# Patient Record
Sex: FEMALE | Race: WHITE | NOT HISPANIC OR LATINO | Employment: FULL TIME | URBAN - NONMETROPOLITAN AREA
[De-identification: names, ages, dates, MRNs, and addresses within clinical notes are randomized per-mention and may not be internally consistent; named-entity substitution may affect disease eponyms.]

---

## 2017-12-10 ENCOUNTER — APPOINTMENT (OUTPATIENT)
Dept: GENERAL RADIOLOGY | Facility: HOSPITAL | Age: 47
End: 2017-12-10

## 2017-12-10 ENCOUNTER — APPOINTMENT (OUTPATIENT)
Dept: CT IMAGING | Facility: HOSPITAL | Age: 47
End: 2017-12-10

## 2017-12-10 ENCOUNTER — HOSPITAL ENCOUNTER (INPATIENT)
Facility: HOSPITAL | Age: 47
LOS: 3 days | Discharge: HOME | End: 2017-12-13
Attending: FAMILY MEDICINE | Admitting: SPECIALIST

## 2017-12-10 PROBLEM — K52.9 COLITIS: Status: ACTIVE | Noted: 2017-12-10

## 2017-12-10 LAB
ALBUMIN SERPL-MCNC: 4.1 G/DL (ref 3.5–5)
ALBUMIN/GLOB SERPL: 1.4 G/DL (ref 1.1–2.5)
ALP SERPL-CCNC: 103 U/L (ref 24–120)
ALT SERPL W P-5'-P-CCNC: 63 U/L (ref 0–54)
ANION GAP SERPL CALCULATED.3IONS-SCNC: 12 MMOL/L (ref 4–13)
APTT PPP: 33.4 SECONDS (ref 24.1–34.8)
AST SERPL-CCNC: 47 U/L (ref 7–45)
BACTERIA UR QL AUTO: ABNORMAL /HPF
BASOPHILS # BLD AUTO: 0.09 10*3/MM3 (ref 0–0.2)
BASOPHILS NFR BLD AUTO: 0.4 % (ref 0–2)
BILIRUB SERPL-MCNC: 0.5 MG/DL (ref 0.1–1)
BILIRUB UR QL STRIP: NEGATIVE
BUN BLD-MCNC: 15 MG/DL (ref 5–21)
BUN/CREAT SERPL: 18.5 (ref 7–25)
CALCIUM SPEC-SCNC: 9.5 MG/DL (ref 8.4–10.4)
CHLORIDE SERPL-SCNC: 104 MMOL/L (ref 98–110)
CLARITY UR: CLEAR
CO2 SERPL-SCNC: 28 MMOL/L (ref 24–31)
COLOR UR: YELLOW
CREAT BLD-MCNC: 0.81 MG/DL (ref 0.5–1.4)
D-LACTATE SERPL-SCNC: 1.9 MMOL/L (ref 0.5–2)
D-LACTATE SERPL-SCNC: 2.6 MMOL/L (ref 0.5–2)
DEPRECATED RDW RBC AUTO: 42.6 FL (ref 40–54)
EOSINOPHIL # BLD AUTO: 0.22 10*3/MM3 (ref 0–0.7)
EOSINOPHIL NFR BLD AUTO: 0.9 % (ref 0–4)
ERYTHROCYTE [DISTWIDTH] IN BLOOD BY AUTOMATED COUNT: 13.9 % (ref 12–15)
GFR SERPL CREATININE-BSD FRML MDRD: 75 ML/MIN/1.73
GLOBULIN UR ELPH-MCNC: 2.9 GM/DL
GLUCOSE BLD-MCNC: 93 MG/DL (ref 70–100)
GLUCOSE UR STRIP-MCNC: NEGATIVE MG/DL
HCT VFR BLD AUTO: 45.5 % (ref 37–47)
HGB BLD-MCNC: 15 G/DL (ref 12–16)
HGB UR QL STRIP.AUTO: NEGATIVE
HOLD SPECIMEN: NORMAL
HYALINE CASTS UR QL AUTO: ABNORMAL /LPF
IMM GRANULOCYTES # BLD: 0.42 10*3/MM3 (ref 0–0.03)
IMM GRANULOCYTES NFR BLD: 1.7 % (ref 0–5)
INR PPP: 1.07 (ref 0.91–1.09)
KETONES UR QL STRIP: NEGATIVE
LEUKOCYTE ESTERASE UR QL STRIP.AUTO: NEGATIVE
LIPASE SERPL-CCNC: 605 U/L (ref 23–203)
LYMPHOCYTES # BLD AUTO: 3.71 10*3/MM3 (ref 0.72–4.86)
LYMPHOCYTES NFR BLD AUTO: 14.8 % (ref 15–45)
MCH RBC QN AUTO: 28.3 PG (ref 28–32)
MCHC RBC AUTO-ENTMCNC: 33 G/DL (ref 33–36)
MCV RBC AUTO: 85.8 FL (ref 82–98)
MONOCYTES # BLD AUTO: 0.91 10*3/MM3 (ref 0.19–1.3)
MONOCYTES NFR BLD AUTO: 3.6 % (ref 4–12)
NEUTROPHILS # BLD AUTO: 19.72 10*3/MM3 (ref 1.87–8.4)
NEUTROPHILS NFR BLD AUTO: 78.6 % (ref 39–78)
NITRITE UR QL STRIP: POSITIVE
NRBC BLD MANUAL-RTO: 0.1 /100 WBC (ref 0–0)
PH UR STRIP.AUTO: 6 [PH] (ref 5–8)
PLATELET # BLD AUTO: 268 10*3/MM3 (ref 130–400)
PMV BLD AUTO: 11.2 FL (ref 6–12)
POTASSIUM BLD-SCNC: 3.7 MMOL/L (ref 3.5–5.3)
PROCALCITONIN SERPL-MCNC: 3.23 NG/ML
PROT SERPL-MCNC: 7 G/DL (ref 6.3–8.7)
PROT UR QL STRIP: NEGATIVE
PROTHROMBIN TIME: 14.2 SECONDS (ref 11.9–14.6)
RBC # BLD AUTO: 5.3 10*6/MM3 (ref 4.2–5.4)
RBC # UR: ABNORMAL /HPF
REF LAB TEST METHOD: ABNORMAL
SODIUM BLD-SCNC: 144 MMOL/L (ref 135–145)
SP GR UR STRIP: >1.03 (ref 1–1.03)
SQUAMOUS #/AREA URNS HPF: ABNORMAL /HPF
TROPONIN I SERPL-MCNC: <0.012 NG/ML (ref 0–0.03)
UROBILINOGEN UR QL STRIP: ABNORMAL
WBC NRBC COR # BLD: 25.07 10*3/MM3 (ref 4.8–10.8)
WBC UR QL AUTO: ABNORMAL /HPF
WHOLE BLOOD HOLD SPECIMEN: NORMAL
WHOLE BLOOD HOLD SPECIMEN: NORMAL

## 2017-12-10 PROCEDURE — 83690 ASSAY OF LIPASE: CPT | Performed by: FAMILY MEDICINE

## 2017-12-10 PROCEDURE — C1751 CATH, INF, PER/CENT/MIDLINE: HCPCS

## 2017-12-10 PROCEDURE — 25010000002 ONDANSETRON PER 1 MG: Performed by: FAMILY MEDICINE

## 2017-12-10 PROCEDURE — 84145 PROCALCITONIN (PCT): CPT | Performed by: FAMILY MEDICINE

## 2017-12-10 PROCEDURE — 0 IOPAMIDOL 61 % SOLUTION: Performed by: FAMILY MEDICINE

## 2017-12-10 PROCEDURE — 25010000002 ENOXAPARIN PER 10 MG: Performed by: SPECIALIST

## 2017-12-10 PROCEDURE — 06HY33Z INSERTION OF INFUSION DEVICE INTO LOWER VEIN, PERCUTANEOUS APPROACH: ICD-10-PCS | Performed by: SPECIALIST

## 2017-12-10 PROCEDURE — 25010000002 MORPHINE PER 10 MG: Performed by: FAMILY MEDICINE

## 2017-12-10 PROCEDURE — 80053 COMPREHEN METABOLIC PANEL: CPT | Performed by: FAMILY MEDICINE

## 2017-12-10 PROCEDURE — 99285 EMERGENCY DEPT VISIT HI MDM: CPT

## 2017-12-10 PROCEDURE — 87040 BLOOD CULTURE FOR BACTERIA: CPT | Performed by: FAMILY MEDICINE

## 2017-12-10 PROCEDURE — 93005 ELECTROCARDIOGRAM TRACING: CPT | Performed by: SPECIALIST

## 2017-12-10 PROCEDURE — 85730 THROMBOPLASTIN TIME PARTIAL: CPT | Performed by: FAMILY MEDICINE

## 2017-12-10 PROCEDURE — 87086 URINE CULTURE/COLONY COUNT: CPT | Performed by: NURSE PRACTITIONER

## 2017-12-10 PROCEDURE — 25010000002 KETOROLAC TROMETHAMINE PER 15 MG: Performed by: FAMILY MEDICINE

## 2017-12-10 PROCEDURE — 81001 URINALYSIS AUTO W/SCOPE: CPT | Performed by: NURSE PRACTITIONER

## 2017-12-10 PROCEDURE — 84484 ASSAY OF TROPONIN QUANT: CPT | Performed by: FAMILY MEDICINE

## 2017-12-10 PROCEDURE — 83605 ASSAY OF LACTIC ACID: CPT | Performed by: FAMILY MEDICINE

## 2017-12-10 PROCEDURE — 85025 COMPLETE CBC W/AUTO DIFF WBC: CPT | Performed by: FAMILY MEDICINE

## 2017-12-10 PROCEDURE — 36415 COLL VENOUS BLD VENIPUNCTURE: CPT | Performed by: FAMILY MEDICINE

## 2017-12-10 PROCEDURE — 71010 HC CHEST PA OR AP: CPT

## 2017-12-10 PROCEDURE — 93010 ELECTROCARDIOGRAM REPORT: CPT | Performed by: INTERNAL MEDICINE

## 2017-12-10 PROCEDURE — 85610 PROTHROMBIN TIME: CPT | Performed by: FAMILY MEDICINE

## 2017-12-10 PROCEDURE — 74177 CT ABD & PELVIS W/CONTRAST: CPT

## 2017-12-10 PROCEDURE — 25010000002 CEFOXITIN PER 1 G: Performed by: SPECIALIST

## 2017-12-10 PROCEDURE — 93005 ELECTROCARDIOGRAM TRACING: CPT

## 2017-12-10 PROCEDURE — 25010000002 MORPHINE SULFATE (PF) 2 MG/ML SOLUTION: Performed by: SPECIALIST

## 2017-12-10 PROCEDURE — 25010000002 LEVOFLOXACIN PER 250 MG: Performed by: FAMILY MEDICINE

## 2017-12-10 RX ORDER — LATANOPROST 50 UG/ML
1 SOLUTION/ DROPS OPHTHALMIC NIGHTLY
Status: ON HOLD | COMMUNITY
End: 2020-12-29

## 2017-12-10 RX ORDER — SUCRALFATE ORAL 1 G/10ML
1 SUSPENSION ORAL EVERY 6 HOURS SCHEDULED
Status: DISCONTINUED | OUTPATIENT
Start: 2017-12-10 | End: 2017-12-13 | Stop reason: HOSPADM

## 2017-12-10 RX ORDER — KETOROLAC TROMETHAMINE 30 MG/ML
30 INJECTION, SOLUTION INTRAMUSCULAR; INTRAVENOUS ONCE
Status: COMPLETED | OUTPATIENT
Start: 2017-12-10 | End: 2017-12-10

## 2017-12-10 RX ORDER — CYCLOBENZAPRINE HCL 10 MG
10 TABLET ORAL 2 TIMES DAILY PRN
COMMUNITY

## 2017-12-10 RX ORDER — AMITRIPTYLINE HYDROCHLORIDE 25 MG/1
50 TABLET, FILM COATED ORAL NIGHTLY
Status: DISCONTINUED | OUTPATIENT
Start: 2017-12-10 | End: 2017-12-13 | Stop reason: HOSPADM

## 2017-12-10 RX ORDER — ALBUTEROL SULFATE 90 UG/1
2 AEROSOL, METERED RESPIRATORY (INHALATION) EVERY 4 HOURS PRN
COMMUNITY

## 2017-12-10 RX ORDER — ONDANSETRON 2 MG/ML
4 INJECTION INTRAMUSCULAR; INTRAVENOUS ONCE
Status: COMPLETED | OUTPATIENT
Start: 2017-12-10 | End: 2017-12-10

## 2017-12-10 RX ORDER — LEVOTHYROXINE SODIUM 0.12 MG/1
125 TABLET ORAL DAILY
COMMUNITY

## 2017-12-10 RX ORDER — FAMOTIDINE 20 MG/1
20 TABLET, FILM COATED ORAL EVERY 12 HOURS SCHEDULED
Status: DISCONTINUED | OUTPATIENT
Start: 2017-12-10 | End: 2017-12-13 | Stop reason: HOSPADM

## 2017-12-10 RX ORDER — HYDRALAZINE HYDROCHLORIDE 20 MG/ML
10 INJECTION INTRAMUSCULAR; INTRAVENOUS EVERY 6 HOURS PRN
Status: DISCONTINUED | OUTPATIENT
Start: 2017-12-10 | End: 2017-12-13 | Stop reason: HOSPADM

## 2017-12-10 RX ORDER — METOPROLOL TARTRATE 100 MG/1
100 TABLET ORAL 2 TIMES DAILY
COMMUNITY

## 2017-12-10 RX ORDER — LIDOCAINE HYDROCHLORIDE 10 MG/ML
1 INJECTION, SOLUTION INFILTRATION; PERINEURAL ONCE
Status: COMPLETED | OUTPATIENT
Start: 2017-12-10 | End: 2017-12-10

## 2017-12-10 RX ORDER — FAMOTIDINE 10 MG/ML
20 INJECTION, SOLUTION INTRAVENOUS EVERY 12 HOURS SCHEDULED
Status: DISCONTINUED | OUTPATIENT
Start: 2017-12-10 | End: 2017-12-13 | Stop reason: HOSPADM

## 2017-12-10 RX ORDER — NALOXONE HCL 0.4 MG/ML
0.4 VIAL (ML) INJECTION
Status: DISCONTINUED | OUTPATIENT
Start: 2017-12-10 | End: 2017-12-13 | Stop reason: HOSPADM

## 2017-12-10 RX ORDER — CLONIDINE HYDROCHLORIDE 0.1 MG/1
0.1 TABLET ORAL 3 TIMES DAILY
COMMUNITY

## 2017-12-10 RX ORDER — MORPHINE SULFATE 2 MG/ML
1 INJECTION, SOLUTION INTRAMUSCULAR; INTRAVENOUS EVERY 4 HOURS PRN
Status: DISCONTINUED | OUTPATIENT
Start: 2017-12-10 | End: 2017-12-13 | Stop reason: HOSPADM

## 2017-12-10 RX ORDER — MORPHINE SULFATE 4 MG/ML
4 INJECTION, SOLUTION INTRAMUSCULAR; INTRAVENOUS ONCE
Status: COMPLETED | OUTPATIENT
Start: 2017-12-10 | End: 2017-12-10

## 2017-12-10 RX ORDER — ONDANSETRON 8 MG/1
8 TABLET, ORALLY DISINTEGRATING ORAL EVERY 6 HOURS PRN
Status: DISCONTINUED | OUTPATIENT
Start: 2017-12-10 | End: 2017-12-13 | Stop reason: HOSPADM

## 2017-12-10 RX ORDER — NITROGLYCERIN 0.4 MG/1
0.4 TABLET SUBLINGUAL
COMMUNITY

## 2017-12-10 RX ORDER — LEVOFLOXACIN 5 MG/ML
750 INJECTION, SOLUTION INTRAVENOUS ONCE
Status: COMPLETED | OUTPATIENT
Start: 2017-12-10 | End: 2017-12-10

## 2017-12-10 RX ORDER — LOSARTAN POTASSIUM 25 MG/1
25 TABLET ORAL
Status: DISCONTINUED | OUTPATIENT
Start: 2017-12-10 | End: 2017-12-13 | Stop reason: HOSPADM

## 2017-12-10 RX ORDER — ASPIRIN 325 MG
325 TABLET ORAL DAILY
COMMUNITY
End: 2020-12-30 | Stop reason: HOSPADM

## 2017-12-10 RX ORDER — LOSARTAN POTASSIUM 100 MG/1
100 TABLET ORAL DAILY
COMMUNITY

## 2017-12-10 RX ORDER — LATANOPROST 50 UG/ML
1 SOLUTION/ DROPS OPHTHALMIC NIGHTLY
Status: DISCONTINUED | OUTPATIENT
Start: 2017-12-10 | End: 2017-12-13 | Stop reason: HOSPADM

## 2017-12-10 RX ORDER — NITROGLYCERIN 0.4 MG/1
0.4 TABLET SUBLINGUAL
Status: DISCONTINUED | OUTPATIENT
Start: 2017-12-10 | End: 2017-12-13 | Stop reason: HOSPADM

## 2017-12-10 RX ORDER — ONDANSETRON HCL IN 0.9 % NACL 8 MG/50 ML
8 INTRAVENOUS SOLUTION, PIGGYBACK (ML) INTRAVENOUS EVERY 6 HOURS PRN
Status: DISCONTINUED | OUTPATIENT
Start: 2017-12-10 | End: 2017-12-13 | Stop reason: HOSPADM

## 2017-12-10 RX ORDER — METOPROLOL TARTRATE 100 MG/1
100 TABLET ORAL EVERY 12 HOURS SCHEDULED
Status: DISCONTINUED | OUTPATIENT
Start: 2017-12-10 | End: 2017-12-13 | Stop reason: HOSPADM

## 2017-12-10 RX ORDER — AMITRIPTYLINE HYDROCHLORIDE 50 MG/1
50 TABLET, FILM COATED ORAL NIGHTLY
COMMUNITY

## 2017-12-10 RX ORDER — DEXTROSE, SODIUM CHLORIDE, AND POTASSIUM CHLORIDE 5; .45; .15 G/100ML; G/100ML; G/100ML
125 INJECTION INTRAVENOUS CONTINUOUS
Status: DISCONTINUED | OUTPATIENT
Start: 2017-12-10 | End: 2017-12-12

## 2017-12-10 RX ORDER — LEVOTHYROXINE SODIUM 0.12 MG/1
125 TABLET ORAL DAILY
Status: DISCONTINUED | OUTPATIENT
Start: 2017-12-10 | End: 2017-12-11

## 2017-12-10 RX ORDER — CYCLOBENZAPRINE HCL 10 MG
10 TABLET ORAL 3 TIMES DAILY PRN
Status: DISCONTINUED | OUTPATIENT
Start: 2017-12-10 | End: 2017-12-13 | Stop reason: HOSPADM

## 2017-12-10 RX ORDER — ALBUTEROL SULFATE 90 UG/1
2 AEROSOL, METERED RESPIRATORY (INHALATION) EVERY 4 HOURS PRN
Status: DISCONTINUED | OUTPATIENT
Start: 2017-12-10 | End: 2017-12-10 | Stop reason: SDUPTHER

## 2017-12-10 RX ORDER — DILTIAZEM HYDROCHLORIDE 60 MG/1
60 TABLET, FILM COATED ORAL EVERY 12 HOURS SCHEDULED
Status: DISCONTINUED | OUTPATIENT
Start: 2017-12-10 | End: 2017-12-13 | Stop reason: HOSPADM

## 2017-12-10 RX ORDER — DILTIAZEM HYDROCHLORIDE 60 MG/1
60 TABLET, FILM COATED ORAL 2 TIMES DAILY
COMMUNITY

## 2017-12-10 RX ORDER — CLONIDINE HYDROCHLORIDE 0.3 MG/1
0.3 TABLET ORAL EVERY 8 HOURS SCHEDULED
Status: DISCONTINUED | OUTPATIENT
Start: 2017-12-10 | End: 2017-12-13 | Stop reason: HOSPADM

## 2017-12-10 RX ORDER — SODIUM CHLORIDE 0.9 % (FLUSH) 0.9 %
1-10 SYRINGE (ML) INJECTION AS NEEDED
Status: DISCONTINUED | OUTPATIENT
Start: 2017-12-10 | End: 2017-12-13 | Stop reason: HOSPADM

## 2017-12-10 RX ADMIN — MORPHINE SULFATE 4 MG: 4 INJECTION, SOLUTION INTRAMUSCULAR; INTRAVENOUS at 03:37

## 2017-12-10 RX ADMIN — POTASSIUM CHLORIDE, DEXTROSE MONOHYDRATE AND SODIUM CHLORIDE 125 ML/HR: 150; 5; 450 INJECTION, SOLUTION INTRAVENOUS at 12:01

## 2017-12-10 RX ADMIN — CLONIDINE HYDROCHLORIDE 0.3 MG: 0.3 TABLET ORAL at 17:18

## 2017-12-10 RX ADMIN — METOPROLOL TARTRATE 100 MG: 100 TABLET ORAL at 20:21

## 2017-12-10 RX ADMIN — METRONIDAZOLE 500 MG: 500 INJECTION, SOLUTION INTRAVENOUS at 18:06

## 2017-12-10 RX ADMIN — DILTIAZEM HYDROCHLORIDE 60 MG: 60 TABLET, FILM COATED ORAL at 20:22

## 2017-12-10 RX ADMIN — IOPAMIDOL 150 ML: 612 INJECTION, SOLUTION INTRAVENOUS at 04:12

## 2017-12-10 RX ADMIN — MORPHINE SULFATE 1 MG: 2 INJECTION, SOLUTION INTRAMUSCULAR; INTRAVENOUS at 12:44

## 2017-12-10 RX ADMIN — SODIUM CHLORIDE, POTASSIUM CHLORIDE, SODIUM LACTATE AND CALCIUM CHLORIDE 1000 ML: 600; 310; 30; 20 INJECTION, SOLUTION INTRAVENOUS at 12:15

## 2017-12-10 RX ADMIN — ONDANSETRON 8 MG: 8 TABLET, ORALLY DISINTEGRATING ORAL at 12:44

## 2017-12-10 RX ADMIN — ENOXAPARIN SODIUM 30 MG: 30 INJECTION SUBCUTANEOUS at 20:22

## 2017-12-10 RX ADMIN — CEFOXITIN 2 G: 2 INJECTION, POWDER, FOR SOLUTION INTRAVENOUS at 17:12

## 2017-12-10 RX ADMIN — METOPROLOL TARTRATE 100 MG: 100 TABLET ORAL at 12:04

## 2017-12-10 RX ADMIN — CLONIDINE HYDROCHLORIDE 0.3 MG: 0.3 TABLET ORAL at 22:22

## 2017-12-10 RX ADMIN — LOSARTAN POTASSIUM 25 MG: 25 TABLET, FILM COATED ORAL at 12:03

## 2017-12-10 RX ADMIN — CEFOXITIN 2 G: 2 INJECTION, POWDER, FOR SOLUTION INTRAVENOUS at 22:22

## 2017-12-10 RX ADMIN — METRONIDAZOLE 500 MG: 500 INJECTION, SOLUTION INTRAVENOUS at 06:13

## 2017-12-10 RX ADMIN — FAMOTIDINE 20 MG: 20 TABLET ORAL at 20:21

## 2017-12-10 RX ADMIN — LEVOFLOXACIN 750 MG: 5 INJECTION, SOLUTION INTRAVENOUS at 06:12

## 2017-12-10 RX ADMIN — METRONIDAZOLE 500 MG: 500 INJECTION, SOLUTION INTRAVENOUS at 23:42

## 2017-12-10 RX ADMIN — MORPHINE SULFATE 1 MG: 2 INJECTION, SOLUTION INTRAMUSCULAR; INTRAVENOUS at 17:19

## 2017-12-10 RX ADMIN — METRONIDAZOLE 500 MG: 500 INJECTION, SOLUTION INTRAVENOUS at 12:14

## 2017-12-10 RX ADMIN — AMITRIPTYLINE HYDROCHLORIDE 50 MG: 25 TABLET, FILM COATED ORAL at 20:21

## 2017-12-10 RX ADMIN — ENOXAPARIN SODIUM 30 MG: 30 INJECTION SUBCUTANEOUS at 12:01

## 2017-12-10 RX ADMIN — ONDANSETRON 4 MG: 2 INJECTION, SOLUTION INTRAMUSCULAR; INTRAVENOUS at 01:47

## 2017-12-10 RX ADMIN — LATANOPROST 1 DROP: 50 SOLUTION OPHTHALMIC at 20:21

## 2017-12-10 RX ADMIN — LIDOCAINE HYDROCHLORIDE 1 ML: 10 INJECTION, SOLUTION INFILTRATION; PERINEURAL at 11:25

## 2017-12-10 RX ADMIN — SODIUM CHLORIDE 1000 ML: 9 INJECTION, SOLUTION INTRAVENOUS at 04:25

## 2017-12-10 RX ADMIN — KETOROLAC TROMETHAMINE 30 MG: 30 INJECTION, SOLUTION INTRAMUSCULAR at 01:45

## 2017-12-10 RX ADMIN — CLONIDINE HYDROCHLORIDE 0.3 MG: 0.3 TABLET ORAL at 12:03

## 2017-12-10 RX ADMIN — DILTIAZEM HYDROCHLORIDE 60 MG: 60 TABLET, FILM COATED ORAL at 12:04

## 2017-12-10 RX ADMIN — LEVOTHYROXINE SODIUM 125 MCG: 0.12 TABLET ORAL at 12:04

## 2017-12-10 RX ADMIN — FAMOTIDINE 20 MG: 10 INJECTION, SOLUTION INTRAVENOUS at 12:02

## 2017-12-10 NOTE — ED PROVIDER NOTES
[facility]  eMERGENCY dEPARTMENT eNCOUnter      Pt Name: Gale Stone  MRN: [id number]  Birthdate 1970  Date of evaluation: 12/10/2017      CHIEF COMPLAINT       Chief Complaint   Patient presents with   • Chest Pain   • Abdominal Pain       Nurses Notes reviewed and I agree except as noted in the HPI.      HISTORY OF PRESENT ILLNESS    Gale Stone is a 47 y.o. female who presents       Patient extremely poor historian who originally checked in for constipation.  Soon after checking in with a chief complaint patient was called to the triage desk and at that point stated I have to go to the bathroom.  She ran to the restroom and came back and stated that she had had constipation ×4 days and could not have a bowel movement.  Patient told triage that she took several laxatives in order to be able to induce a bowel movement.  Patient could not remember when she took the laxatives.  Patient then told triage that she had been seen at [facility] and had been admitted until yesterday for diarrhea.  Patient was questioned repeatedly about this contradiction however the patient kept repeating it.  Patient was not really sure of dates times or what really transpired.  During the course of triage patient actually went to the bathroom 2 other times.  Both times she stated she had very large bowel movements.  At this point she was still saying she was constipated and could not have a bowel movement ×4 days triage asked her if she felt better and she said it just however now have abdominal pain and I have chest pain in my sternum.  Upon arrival patient's vital signs were not indicative of any infectious process.  Only after initial lab values were obtained with any nothing abnormal noted.  At that time it was noted that she did have an isolated leukocytosis.     REVIEW OF SYSTEMS     Review of Systems     Very unreliable historian leads to a question of the validity of the following review of  systems.    CONSTITUION: No Fever, No chills, No activity change, No diaphoresis, No unexpected wt change.    HENT: No congestion, no dental problems, no ear pain or discharge,   EYES: No drainage, no itching, no photophobia, no visual disturbance  RESPIRATORY: No apnea, no chest tightness, no cough, no shortness of breath, no stridor, no wheezing  CARDIOVASCULAR: As per the HPI  GI: As per the HPI  ENDOCRINE: No polydipsia, no polyphagia, no polyuria, no cold or heat intolerance  : No difficulty urinating, no dyspareunia, nodysuria, no flank pain, no frequency, no genital sore, no hematuria, no menstrual problem if female, no decreased urniation, no hesitation of urination, no vaginal discharge if female, no vaginal pain if female no penile pain if male  ALLERG/IMMUNO:  No env or food allergies, not immunocompromised  NEUROLOGICAL: No dizziness, no facial asymmetry, no Headaches, no Light-headedness, no numbess nor paresthesias, no seizures, no speech difficulty, No syncope, no temors, no weakness  HEMATOLOGIC: No adenopathy, no unusual bleeding or bruising  MUSC: No arthralgia, no back pain, no joint swelling, no myalgias, no neck pain, no neck stiffness  SKIN: No rash, no color change, no pallor, no wound  PSYCH: No agitation, no behavior problem, no confusion, no decr concentration, no hallucinations, no suicidal ideation, no homicidal ideation, no self injury, no sleep disturbance      PAST MEDICAL HISTORY     Past Medical History:   Diagnosis Date   • Disease of thyroid gland    • Hypertension        SURGICAL HISTORY      has a past surgical history that includes Tubal ligation.    CURRENT MEDICATIONS        Medication List      ASK your doctor about these medications          albuterol 108 (90 Base) MCG/ACT inhaler   Commonly known as:  PROVENTIL HFA;VENTOLIN HFA       amitriptyline 50 MG tablet   Commonly known as:  ELAVIL       aspirin 325 MG tablet       CloNIDine 0.3 MG tablet   Commonly known as:   "CATAPRES       COZAAR PO       cyclobenzaprine 10 MG tablet   Commonly known as:  FLEXERIL       diltiaZEM 60 MG tablet   Commonly known as:  CARDIZEM       latanoprost 0.005 % ophthalmic solution   Commonly known as:  XALATAN       levothyroxine 125 MCG tablet   Commonly known as:  SYNTHROID, LEVOTHROID       metoprolol tartrate 100 MG tablet   Commonly known as:  LOPRESSOR       NITROSTAT 0.4 MG SL tablet   Generic drug:  nitroglycerin           ALLERGIES     has No Known Allergies.    FAMILY HISTORY     has no family status information on file.  family history is not on file.    SOCIAL HISTORY      reports that she has never smoked. She does not have any smokeless tobacco history on file. She reports that she does not drink alcohol or use illicit drugs.    PHYSICAL EXAM     INITIAL VITALS:  height is 160 cm (63\") and weight is 104 kg (230 lb). Her temperature is 97.5 °F (36.4 °C). Her blood pressure is 160/107 (abnormal) and her pulse is 70. Her respiration is 16 and oxygen saturation is 93%.    Physical Exam    CONSTITUTIONAL: Well developed, well nourished, not diaphoretic nor distressed  HENT: Normocephalic, atraumatic, oropharynx clear and moist  EYES: PERRL, EOM normal, no discharge, no scleral icterus  NECK: ROM normal, supple, no tracheal deviation nor JVD, no stridor  CARDIOVASCULAR: Normal rate and rhythm, heart sounds normal, no rub no gallop, intact distal pulses, normal cap refill  PULMONARY: Normal effort and breath sounds, no distress, no wheezes, rhonci or rales, no chest tenderness  ABDOMINAL: Soft, diffuse mild tenderness noted increased bowel sounds GENITOURINARY/ANORECTAL: deferred  MUSCKULOSKELETAL: ROM normal, no tenderness nor deformity, no edema  NEUROLOGICAL: Alert, oriented x 3, DTRS normal, CN x 10 normal, normal tone  SKIN: Warm, dry, no erythema, no rash, normal color  PSYCH: Mood and affect normal, behavior normal, thought content and judgement normal.    DIFFERENTIAL DIAGNOSIS: "       DIAGNOSTIC RESULTS     EKG: All EKG's are interpreted by the Emergency Department Physician who either signs or Co-signs this chart in the absence of a cardiologist.  EKG normal sinus rhythm 76 bpm normal axis no acute ST-T wave changes noted IVCD noted    RADIOLOGY: non-plain film images(s) such as CT, Ultrasound and MRI are read by the radiologist.  Plain radiographic images are visualized and preliminarily interpreted by the emergency physician unless otherwise stated below.  XR Chest 1 View    (Results Pending)   CT Abdomen Pelvis With Contrast    (Results Pending)              LABS:   Lab Results (last 24 hours)     Procedure Component Value Units Date/Time    Troponin [[id number]]  (Normal) Collected:  12/10/17 0048    Specimen:  Blood Updated:  12/10/17 0117     Troponin I <0.012 ng/mL     CBC & Differential [[id number]] Collected:  12/10/17 0048    Specimen:  Blood Updated:  12/10/17 0101    Narrative:       The following orders were created for panel order CBC & Differential.  Procedure                               Abnormality         Status                     ---------                               -----------         ------                     CBC Auto Differential[[id number]]         Abnormal            Final result                 Please view results for these tests on the individual orders.    Comprehensive Metabolic Panel [[id number]]  (Abnormal) Collected:  12/10/17 0048    Specimen:  Blood Updated:  12/10/17 0107     Glucose 93 mg/dL      BUN 15 mg/dL      Creatinine 0.81 mg/dL      Sodium 144 mmol/L      Potassium 3.7 mmol/L      Chloride 104 mmol/L      CO2 28.0 mmol/L      Calcium 9.5 mg/dL      Total Protein 7.0 g/dL      Albumin 4.10 g/dL      ALT (SGPT) 63 (H) U/L      AST (SGOT) 47 (H) U/L      Alkaline Phosphatase 103 U/L      Total Bilirubin 0.5 mg/dL      eGFR Non African Amer 75 mL/min/1.73      Globulin 2.9 gm/dL      A/G Ratio 1.4 g/dL      BUN/Creatinine Ratio 18.5     Anion Gap 12.0  mmol/L     CBC Auto Differential [[id number]]  (Abnormal) Collected:  12/10/17 0048    Specimen:  Blood Updated:  12/10/17 0101     WBC 25.07 (H) 10*3/mm3      RBC 5.30 10*6/mm3      Hemoglobin 15.0 g/dL      Hematocrit 45.5 %      MCV 85.8 fL      MCH 28.3 pg      MCHC 33.0 g/dL      RDW 13.9 %      RDW-SD 42.6 fl      MPV 11.2 fL      Platelets 268 10*3/mm3      Neutrophil % 78.6 (H) %      Lymphocyte % 14.8 (L) %      Monocyte % 3.6 (L) %      Eosinophil % 0.9 %      Basophil % 0.4 %      Immature Grans % 1.7 %      Neutrophils, Absolute 19.72 (H) 10*3/mm3      Lymphocytes, Absolute 3.71 10*3/mm3      Monocytes, Absolute 0.91 10*3/mm3      Eosinophils, Absolute 0.22 10*3/mm3      Basophils, Absolute 0.09 10*3/mm3      Immature Grans, Absolute 0.42 (H) 10*3/mm3      nRBC 0.1 (H) /100 WBC     Lactic Acid, Plasma [[id number]]  (Abnormal) Collected:  12/10/17 0336    Specimen:  Blood Updated:  12/10/17 0358     Lactate 2.6 (C) mmol/L     Lactic Acid, Reflex Timer [[id number]] Collected:  12/10/17 0336    Specimen:  Blood Updated:  12/10/17 0358          EMERGENCY DEPARTMENT COURSE:   Vitals:    Vitals:    12/10/17 0315 12/10/17 0427 12/10/17 0430 12/10/17 0459   BP:  (!) 160/107     Pulse: 73  75 70   Resp:       Temp:       SpO2: 93%  94% 93%   Weight:       Height:           The patient was given the following medications:  Medications   metroNIDAZOLE (FLAGYL) IVPB 500 mg (not administered)   levoFLOXacin (LEVAQUIN) 750 mg/150 mL D5W (premix) (LEVAQUIN) 750 mg (not administered)   ketorolac (TORADOL) injection 30 mg (30 mg Intravenous Given 12/10/17 0145)   ondansetron (ZOFRAN) injection 4 mg (4 mg Intravenous Given 12/10/17 0147)   Morphine sulfate (PF) injection 4 mg (4 mg Intravenous Given 12/10/17 0337)   sodium chloride 0.9 % bolus 1,000 mL (1,000 mL Intravenous New Bag 12/10/17 9176)   iopamidol (ISOVUE-300) 61 % injection 150 mL (150 mL Intravenous Given 12/10/17 0262)       ED Course   Patient received no relief  from IV Toradol.  Patient was given morphine and did receive relief.  Patient's vital signs have remained stable throughout her stay.  No indication of sepsis at this time.    CRITICAL CARE:  none    CONSULTS:  none    PROCEDURES:  None    FINAL IMPRESSION    No diagnosis found.      DISPOSITION/PLAN   Patient will be admitted to the service of Dr. Cui.  She is in improved condition.      PATIENT REFERRED TO:  No follow-up provider specified.    DISCHARGE MEDICATIONS:     Medication List      ASK your doctor about these medications          albuterol 108 (90 Base) MCG/ACT inhaler   Commonly known as:  PROVENTIL HFA;VENTOLIN HFA       amitriptyline 50 MG tablet   Commonly known as:  ELAVIL       aspirin 325 MG tablet       CloNIDine 0.3 MG tablet   Commonly known as:  CATAPRES       COZAAR PO       cyclobenzaprine 10 MG tablet   Commonly known as:  FLEXERIL       diltiaZEM 60 MG tablet   Commonly known as:  CARDIZEM       latanoprost 0.005 % ophthalmic solution   Commonly known as:  XALATAN       levothyroxine 125 MCG tablet   Commonly known as:  SYNTHROID, LEVOTHROID       metoprolol tartrate 100 MG tablet   Commonly known as:  LOPRESSOR       NITROSTAT 0.4 MG SL tablet   Generic drug:  nitroglycerin           (Please note that portions of this note were completed with a voice recognition program.)    Carley Leong, DO Carley Leong,   12/10/17 0513       Carley Leong DO  12/10/17 0600

## 2017-12-10 NOTE — H&P
Patient Care Team:  No Known Provider as PCP - General (Pharmacy)    Chief complaint central abdominal pain   Subjective     Subjective     Gale Stone  is a 47 y.o. female presents withCentral abdominal pain.  She states that she was admitted at [facility] 5 days ago and was in the hospital for the past 5 days with breathing problems and also some nausea.  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  She was released from the hospital on Saturday and she had some central abdominal pain and also some diarrhea and with this she was taken to [facility] for evaluation she told them she also had a umbilical hernia and the ambulance personnel took her to [facility] as there is no surgeon at [facility] if surgery was required.  On examination once again her history is somewhat sketchy she vacillates between breathing problems, hypertension, nausea, and intermittent diarrhea.  No history of any antibiotic use.  On examination she has occasional bowel sounds, tenderness in the central and right and left flank.  No peritoneal signs, I do not appreciate any hernia in the periumbilical region which she states that she has a hernia and on her CT scan there shows to be colitis from the hepatic flexure to the mid descending colon.  There is no hernia noted in the abdominal wall either.  With the above problem is presumed that she has colitis her last colonoscopy was over 10 years prior we will begin treatment for colitis we will check a C. difficile, we will have a PICC line placed, she has a large large amount of bruising from her Lovenox we will change that to the thigh instead of the abdomen and ultimately had a colonoscopy completed or set up prior  to her discharge.      Past surgical history significant for tubal ligation and also tubes and the eye for treatment of glaucoma by Dr. Ham     Medical history significant for hypertension, obesity, glaucoma, asthma, hypothyroidism and hypoglycemia.      She is cared for at the [facility] medical clinic.      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    Review of Systems   Pertinent items are noted in HPI, all other systems reviewed and negative    History  Past Medical History:   Diagnosis Date   • Disease of thyroid gland    • Hypertension      Past Surgical History:   Procedure Laterality Date   • TUBAL ABDOMINAL LIGATION       History reviewed. No pertinent family history.  Social History   Substance Use Topics   • Smoking status: Never Smoker   • Smokeless tobacco: None   • Alcohol use No       (Not in a hospital admission)  Allergies:  Review of patient's allergies indicates no known allergies.    Problem list  Patient Active Problem List   Diagnosis   • Colitis       Objective      Objective     Vital Signs  Temp:  [97.5 °F (36.4 °C)-98.1 °F (36.7 °C)] 98.1 °F (36.7 °C)  Heart Rate:  [70-80] 74  Resp:  [16] 16  BP: (118-166)/() 153/98    Physical Exam:      General Appearance:    Alert, cooperative, in no acute distress   Head:    Normocephalic, without obvious abnormality, atraumatic   Eyes:            Lids and lashes normal, conjunctivae and sclerae normal, no   icterus, no pallor, corneas clear, PERRLA   Ears:    Ears appear intact with no abnormalities noted   Throat:   No oral lesions, no thrush, oral mucosa moist   Neck:   No adenopathy, supple, trachea midline, no thyromegaly, no   carotid bruit, no JVD   Back:     No kyphosis present, no scoliosis present, no skin lesions,      erythema or scars, no tenderness to percussion or                   palpation,   range of  motion normal   Lungs:     Clear to auscultation,respirations regular, even and                  unlabored    Heart:    Regular rhythm and normal rate, normal S1 and S2, no            murmur, no gallop, no rub, no click   Chest Wall:    No abnormalities observed   Abdomen:    Multiple sites were Lovenox has been given over the past week with significant bruising noted, occasional bowel sounds, well-healed infraumbilical incision, no obvious hernia noted, tenderness in the central portion of her abdomen as well as right and left flank more so at the splenic flexure.  No peritoneal signs.   Rectal:     Deferred   Extremities:   Moves all extremities well, no edema, no cyanosis, no             redness   Pulses:   Pulses palpable and equal bilaterally   Skin:   No bleeding, bruising or rash   Lymph nodes:   No palpable adenopathy   Neurologic:   Cranial nerves 2 - 12 grossly intact, sensation intact, DTR       present and equal bilaterally       Results Review:    I reviewed the patient's new imaging results and agree with the interpretation.      Results from last 7 days  Lab Units 12/10/17  0048   WBC 10*3/mm3 25.07*   HEMOGLOBIN g/dL 15.0   HEMATOCRIT % 45.5   PLATELETS 10*3/mm3 268          Results from last 7 days  Lab Units 12/10/17  0048   SODIUM mmol/L 144   POTASSIUM mmol/L 3.7   CHLORIDE mmol/L 104   CO2 mmol/L 28.0   BUN mg/dL 15   CREATININE mg/dL 0.81   CALCIUM mg/dL 9.5   BILIRUBIN mg/dL 0.5   ALK PHOS U/L 103   ALT (SGPT) U/L 63*   AST (SGOT) U/L 47*   GLUCOSE mg/dL 93       Assessment/Plan     Assessment/Plan     Active Problems:    Colitis      Patient with obesity, hypertension, hypothyroidism, hypo-glycemia, glaucoma, now with recent history of central abdominal pain and a white count elevation of 25,000 with a CT scan showing colitis from the hepatic flexure to the distal descending colon.  We will check an C. difficile on the patient, also begin treatment for presumed ischemic colitis ultimately she  will need a intervention with a colonoscopy to evaluate the colon in the near future.  Her  sees Dr. Hollins.  She has not seen a gastroenterologist in the past 10 years.  Patient and  are aware of the findings and plan and give their informed consent for admission.     Hospitalist tamra Scott to see, also a PICC line will be placed.        I discussed the patients findings and my recommendations with patient, family, nursing staff and primary care team.     Rm Cox MD  12/10/17  6:53 AM    Time:Time spent with the patient 30 minutes     EMR Dragon/Transcription disclaimer: Much of this encounter note is an electronic transcription/translation of spoken language to printed text. The electronic translation of spoken language may permit erroneous, or at times, nonsensical words or phrases to be inadvertently transcribed; although I have reviewed the note for such errors, some may still exist.

## 2017-12-10 NOTE — CONSULTS
11 cm Dual Lumen Power Midline catheter placed in pt right basilic vein. Pt tolerated procedure well. Line flushes and draws well. Sterile dressing applied.

## 2017-12-10 NOTE — CONSULTS
"      [facility] Medicine Consult  Inpatient Consult to Hospitalist  Consult performed by: TREVOR WARNER  Consult ordered by: NALINI COX          Date of Admission: 12/10/2017  Date of Consult: 12/10/17    Primary Care Physician: No Known Provider  Referring Physician: Dr. Nalini Cox  Chief Complaint/Reason for Consultation: Hypertension    Subjective   History of Present Illness  The patient is a 47-year-old  female who presented to the [facility] emergency department early this morning after being discharged from [facility].  She states she was only home for a period of about 2 hours after being discharged from the hospital. The patient states she was in the bathroom at her home and felt like she was going to \"pass out\" so she told her  to call an ambulance.    The patient is felt to be a very vague historian.  She states that she was admitted to [facility] from last Sunday through Saturday for what she describes as vomiting and breathing problems.  She does have a known history of asthma.  She denies any shortness of breath or chest pain at this time.  She denies being on any antibiotics while recently hospitalized, she states the only thing she remembers taking are breathing treatments.    Upon presentation to the emergency department, the patient complained of centralized abdominal pain and chest pain.  She did tell Dr. Cox that she had been having diarrhea, she denies this to me.  She denies any nausea or vomiting at this time.  She states her last colonoscopy was over 10 years ago.  She does have lower abdominal tenderness upon my examination.  She denies any dysuria or frequency.    The patient states she has a past medical history significant for hypertension, hypothyroidism, asthma, and glaucoma.  She states that when she has been acutely ill in the past, she has trouble controlling her blood " pressure. Her current blood pressure is 138/102 but she not yet had her antihypertensive medications today.    Review of Systems   Otherwise complete ROS is negative except as mentioned above.    Past Medical History:   Past Medical History:   Diagnosis Date   • Disease of thyroid gland    • Hypertension      Past Surgical History:  Past Surgical History:   Procedure Laterality Date   • TUBAL ABDOMINAL LIGATION       Social History: She reports that she does not drink alcohol or use illicit drugs.  She is a former smoker, states that she quit 18 years ago.  She only smoked for about 4 years. She lives with her . She has 2 children, a son and a daughter.    Family History: She reports that her entire family has had problems with blood pressure.  She states that her father as well as 4 of his brothers had problems with hypertension.  She believes her maternal grandfather had several different types of cancer, but she is unsure what type of cancer.    Allergies: No Known Allergies  Medications: Scheduled Meds:  amitriptyline 50 mg Oral Nightly   cefOXitin 2 g Intravenous Q6H   CloNIDine 0.3 mg Oral Q8H   diltiaZEM 60 mg Oral Q12H   enoxaparin 30 mg Subcutaneous Q12H   famotidine 20 mg Intravenous Q12H   Or      famotidine 20 mg Oral Q12H   lactated ringers 1,000 mL Intravenous Once   latanoprost 1 drop Both Eyes Nightly   levothyroxine 125 mcg Oral Daily   losartan 25 mg Oral Q24H   metoprolol tartrate 100 mg Oral Q12H   metroNIDAZOLE 500 mg Intravenous Q6H   sucralfate 1 g Oral Q6H     Continuous Infusions:  dextrose 5 % and sodium chloride 0.45 % with KCl 20 mEq/L 125 mL/hr     PRN Meds:.•  albuterol  •  cyclobenzaprine  •  Morphine **AND** naloxone  •  Morphine **AND** naloxone  •  nitroglycerin  •  ondansetron  •  ondansetron ODT  •  sodium chloride    Objective   Objective    Physical Exam:   Temp:  [97.5 °F (36.4 °C)-98.3 °F (36.8 °C)] 98.3 °F (36.8 °C)  Heart Rate:  [69-80] 70  Resp:  [16] 16  BP:  (118-166)/() 138/102  Physical Exam   Constitutional: She is oriented to person, place, and time. She appears well-developed and well-nourished. No distress.   HENT:   Head: Normocephalic and atraumatic.   Neck: Normal range of motion. Neck supple.   Cardiovascular: Normal rate, regular rhythm and normal heart sounds.  Exam reveals no gallop and no friction rub.    No murmur heard.  Pulmonary/Chest: Effort normal and breath sounds normal. She has no wheezes. She has no rales.   Abdominal: Soft. Bowel sounds are normal. She exhibits no distension. There is tenderness (Lower abdominal/suprapubic tenderness).   Musculoskeletal: Normal range of motion. She exhibits no edema.   Neurological: She is alert and oriented to person, place, and time.   Skin: Skin is warm and dry.   Scattered bruising on abdomen related to Lovenox administration from recent hospitalization   Psychiatric: She has a normal mood and affect. Her behavior is normal. Judgment and thought content normal.   Vitals reviewed.    Results Reviewed:  I have personally reviewed current lab, radiology, and data and agree with results.  Lab Results (last 24 hours)     Procedure Component Value Units Date/Time    CBC & Differential [[id number]] Collected:  12/10/17 0048    Specimen:  Blood Updated:  12/10/17 0101    Narrative:       The following orders were created for panel order CBC & Differential.  Procedure                               Abnormality         Status                     ---------                               -----------         ------                     CBC Auto Differential[[id number]]         Abnormal            Final result                 Please view results for these tests on the individual orders.    CBC Auto Differential [[id number]]  (Abnormal) Collected:  12/10/17 0048    Specimen:  Blood Updated:  12/10/17 0101     WBC 25.07 (H) 10*3/mm3      RBC 5.30 10*6/mm3      Hemoglobin 15.0 g/dL      Hematocrit 45.5 %      MCV 85.8 fL       MCH 28.3 pg      MCHC 33.0 g/dL      RDW 13.9 %      RDW-SD 42.6 fl      MPV 11.2 fL      Platelets 268 10*3/mm3      Neutrophil % 78.6 (H) %      Lymphocyte % 14.8 (L) %      Monocyte % 3.6 (L) %      Eosinophil % 0.9 %      Basophil % 0.4 %      Immature Grans % 1.7 %      Neutrophils, Absolute 19.72 (H) 10*3/mm3      Lymphocytes, Absolute 3.71 10*3/mm3      Monocytes, Absolute 0.91 10*3/mm3      Eosinophils, Absolute 0.22 10*3/mm3      Basophils, Absolute 0.09 10*3/mm3      Immature Grans, Absolute 0.42 (H) 10*3/mm3      nRBC 0.1 (H) /100 WBC     Comprehensive Metabolic Panel [[id number]]  (Abnormal) Collected:  12/10/17 0048    Specimen:  Blood Updated:  12/10/17 0107     Glucose 93 mg/dL      BUN 15 mg/dL      Creatinine 0.81 mg/dL      Sodium 144 mmol/L      Potassium 3.7 mmol/L      Chloride 104 mmol/L      CO2 28.0 mmol/L      Calcium 9.5 mg/dL      Total Protein 7.0 g/dL      Albumin 4.10 g/dL      ALT (SGPT) 63 (H) U/L      AST (SGOT) 47 (H) U/L      Alkaline Phosphatase 103 U/L      Total Bilirubin 0.5 mg/dL      eGFR Non African Amer 75 mL/min/1.73      Globulin 2.9 gm/dL      A/G Ratio 1.4 g/dL      BUN/Creatinine Ratio 18.5     Anion Gap 12.0 mmol/L     Troponin [[id number]]  (Normal) Collected:  12/10/17 0048    Specimen:  Blood Updated:  12/10/17 0117     Troponin I <0.012 ng/mL     [facility] Draw [[id number]] Collected:  12/10/17 0048    Specimen:  Blood Updated:  12/10/17 0203    Narrative:       The following orders were created for panel order [facility] Draw.  Procedure                               Abnormality         Status                     ---------                               -----------         ------                     Light Blue Top[[id number]]                                    Final result               Green Top (Gel)[[id number]]                                   Final result               Lavender Top[[id number]]                                      Final result               Red Top[[id number]]                                            Final result                 Please view results for these tests on the individual orders.    Light Blue Top [[id number]] Collected:  12/10/17 0048    Specimen:  Blood Updated:  12/10/17 0203     Extra Tube hold for add-on      Auto resulted       Green Top (Gel) [[id number]] Collected:  12/10/17 0048    Specimen:  Blood Updated:  12/10/17 0203     Extra Tube Hold for add-ons.      Auto resulted.       Lavender Top [[id number]] Collected:  12/10/17 0048    Specimen:  Blood Updated:  12/10/17 0203     Extra Tube hold for add-on      Auto resulted       Red Top [[id number]] Collected:  12/10/17 0048    Specimen:  Blood Updated:  12/10/17 0203     Extra Tube Hold for add-ons.      Auto resulted.       Lactic Acid, Plasma [[id number]]  (Abnormal) Collected:  12/10/17 0336    Specimen:  Blood Updated:  12/10/17 0358     Lactate 2.6 (C) mmol/L     Lipase [[id number]]  (Abnormal) Collected:  12/10/17 0609    Specimen:  Blood Updated:  12/10/17 0641     Lipase 605 (H) U/L     aPTT [[id number]]  (Normal) Collected:  12/10/17 0609    Specimen:  Blood Updated:  12/10/17 0656     PTT 33.4 seconds     Protime-INR [[id number]]  (Normal) Collected:  12/10/17 0609    Specimen:  Blood Updated:  12/10/17 0656     Protime 14.2 Seconds      INR 1.07    Procalcitonin [[id number]]  (Abnormal) Collected:  12/10/17 0609    Specimen:  Blood Updated:  12/10/17 0700     Procalcitonin 3.23 (H) ng/mL     Narrative:       SIRS, sepsis, severe sepsis, and septic shock are categorized according to the criteria of the consensus conference of the American College of Chest Physicians/Society of Critical Care Medicine.    PCT < 0.5 ng/mL     Systemic infection (sepsis) is not likely.    PCT >0.5 and < 2.0 ng/mL Systemic infection (sepsis) is possible, but other conditions are known to elevate PCT as well.    PCT > 2.0 ng/mL     Systemic infection (sepsis) is likely, unless other causes are known.      PCT > 10.0  ng/mL    Important systemic inflammatory response, almost exclusively due to severe bacterial sepsis or septic shock.    PCT values of < 0.5 ng/mL do not exclude an infection, because localized infections (without systemic signs) may be associated with such low concentrations, or a systemic infection in its initial stages (<6 hours).  Increased PCT can occur without infection.  PCT concentrations between 0.5 and 2.0 ng/mL should be interpreted taking into account the patients history.  It is recommended to retest PCT within 6-24 hours if any concentrations < 2.0 ng/mL are obtained.    Lactic Acid, Reflex Timer [[id number]] Collected:  12/10/17 0336    Specimen:  Blood Updated:  12/10/17 0701     Extra Tube Hold for add-ons.      Auto resulted.       Blood Culture - Blood, [[id number]] Collected:  12/10/17 0608    Specimen:  Blood from Arm, Right Updated:  12/10/17 0805    Blood Culture - Blood, [[id number]] Collected:  12/10/17 0603    Specimen:  Blood from Arm, Right Updated:  12/10/17 0805        Imaging Results (last 24 hours)     Procedure Component Value Units Date/Time    CT Abdomen Pelvis With Contrast [[id number]] Collected:  12/10/17 0658     Updated:  12/10/17 0722    Narrative:          History:  47-year-old evaluated for abdominal pain and leukocytosis.     Reference:  CT abdomen pelvis January 2012.     Technique  Contrast-enhanced CT abdomen/pelvis was performed with coronal and  sagittal reformatted images provided.     For this CT exam, one or more of the following dose reduction techniques  was employed:  -automated exposure control  -mA and/or kVp adjustment for patient size  -iterative reconstruction      mGy-cm     Findings     Chest  Incidental scanning through the lower chest demonstrates clear lung  bases. Slightly elevated right hemidiaphragm. Areas of suspected fat  necrosis in the right breast noted.     Abdomen  Fatty liver. Gallbladder is within normal limits for CT.  Spleen  unremarkable. No pancreatic or adrenal abnormality. No significant renal  abnormality. Minimal plaquing at the distal abdominal aorta without  aneurysm. Celiac, SMA, and CRISTOPHER vessels are widely patent proximally.     Scattered wall thickening throughout the colon most significant at the  splenic flexure and proximal descending colon where there is slight  edema/inflammation of the adjacent fat. There is no evidence of  perforation or abscess. No bowel obstructive process. The appendix is  normal. There is no free air or free fluid. No lymphadenopathy.     Pelvis  Urinary bladder unremarkable. The uterus is unremarkable. Both ovaries  are visualized. No pelvic free fluid.     No acute osseous abnormalities.       Impression:       Impression:  1. Colitis. Consider infectious and inflammatory etiologies.  2. Incidental findings as described.     A preliminary report was provided by [facility]. Final impression is  concordant with preliminary results.  This report was finalized on 12/10/2017 07:19 by  Esvin Conway, .    XR Chest 1 View [[id number]] Collected:  12/10/17 0728     Updated:  12/10/17 0732    Narrative:       History:  47-year-old evaluated for chest pain.      Reference:  No prior chest x-ray     Findings:  Frontal chest radiograph performed.     The heart and mediastinum appear normal. Suboptimal inspiration with  lungs grossly clear. The extreme lung bases are not evaluated.. No  pleural fluid or pneumothorax. No acute osseous abnormality.          Impression:       Impression:  No acute cardiopulmonary findings.  This report was finalized on 12/10/2017 07:29 by  Esvin Conway, .          Assessment / Plan   Assessment:   1. Colitis  2. Leukocytosis  3. Lactic Acidosis    4. Chest pain- atypical, likely secondary to #1, negative troponin  5. Transaminitis, likely secondary to #1  6. Hypertension, suspect this may be worse than normal given her abdominal pain  7. History of hypothyroidism  8. History of  asthma  9. Obesity- BMI 38    Plan:   1. Continue current treatment regimen for colitis with Levaquin, Flagyl, and Mefoxin  2. Continue home antihypertensive medications as resumed by Dr. Cox. Would like to be conservative and not make any changes to her existing regimen for now.  3. Will Add PRN Hydralazine  4. GI Panel  5. Follow-up on reflex lactate   6. Labs in AM as ordered by Dr. Cox- CBC, CMP, ESR. Will add TSH and hgb A1c for baseline.  7. Urinalysis with culture if indicated  8. Repeat EKG pending  9. Obtain records from recent hospitalization at [facility]    I discussed the patients findings and my recommendations with the patient and Dr. Al .    IRINA Morales  12/10/17  10:19 AM    Chart reviewed  Patient examined   Agree with assessment and plan  Discussed with patient and Michelle Al DO  12/10/17  12:48 PM

## 2017-12-10 NOTE — PLAN OF CARE
Problem: Patient Care Overview (Adult)  Goal: Plan of Care Review  Outcome: Ongoing (interventions implemented as appropriate)    12/10/17 7030   Coping/Psychosocial Response Interventions   Plan Of Care Reviewed With patient   Patient Care Overview   Progress progress toward functional goals as expected   Outcome Evaluation   Outcome Summary/Follow up Plan PRN PAIN MED AVAILABLE AND PT. HAS REQUIRED THIS SHIFT. PT. UP AD ANDREINA. STOOL SPECIMEN STILL NEEDED BUT NO BM SINCE ADMISSION TO FLOOR. TOLERATING CLEAR LIQUIDS OK. NO DISTRESS NOTED.        Goal: Discharge Needs Assessment  Outcome: Ongoing (interventions implemented as appropriate)    Problem: Infection, Risk/Actual (Adult)  Goal: Identify Related Risk Factors and Signs and Symptoms  Outcome: Ongoing (interventions implemented as appropriate)  Goal: Infection Prevention/Resolution  Outcome: Ongoing (interventions implemented as appropriate)    Problem: Pain, Acute (Adult)  Goal: Identify Related Risk Factors and Signs and Symptoms  Outcome: Ongoing (interventions implemented as appropriate)  Goal: Acceptable Pain Control/Comfort Level  Outcome: Ongoing (interventions implemented as appropriate)

## 2017-12-11 LAB
ADV 40+41 DNA STL QL NAA+NON-PROBE: NOT DETECTED
ALBUMIN SERPL-MCNC: 3.2 G/DL (ref 3.5–5)
ALBUMIN/GLOB SERPL: 1.2 G/DL (ref 1.1–2.5)
ALP SERPL-CCNC: 65 U/L (ref 24–120)
ALT SERPL W P-5'-P-CCNC: 50 U/L (ref 0–54)
ANION GAP SERPL CALCULATED.3IONS-SCNC: 9 MMOL/L (ref 4–13)
AST SERPL-CCNC: 33 U/L (ref 7–45)
ASTRO TYP 1-8 RNA STL QL NAA+NON-PROBE: NOT DETECTED
BILIRUB SERPL-MCNC: 0.3 MG/DL (ref 0.1–1)
BUN BLD-MCNC: 8 MG/DL (ref 5–21)
BUN/CREAT SERPL: 11.3 (ref 7–25)
C CAYETANENSIS DNA STL QL NAA+NON-PROBE: NOT DETECTED
C DIFF TOX GENS STL QL NAA+PROBE: NOT DETECTED
CALCIUM SPEC-SCNC: 8.6 MG/DL (ref 8.4–10.4)
CAMPY SP DNA.DIARRHEA STL QL NAA+PROBE: NOT DETECTED
CHLORIDE SERPL-SCNC: 104 MMOL/L (ref 98–110)
CO2 SERPL-SCNC: 25 MMOL/L (ref 24–31)
CREAT BLD-MCNC: 0.71 MG/DL (ref 0.5–1.4)
CRYPTOSP STL CULT: NOT DETECTED
DEPRECATED RDW RBC AUTO: 44.2 FL (ref 40–54)
E COLI DNA SPEC QL NAA+PROBE: NOT DETECTED
E HISTOLYT AG STL-ACNC: NOT DETECTED
EAEC PAA PLAS AGGR+AATA ST NAA+NON-PRB: NOT DETECTED
EC STX1+STX2 GENES STL QL NAA+NON-PROBE: NOT DETECTED
EPEC EAE GENE STL QL NAA+NON-PROBE: NOT DETECTED
ERYTHROCYTE [DISTWIDTH] IN BLOOD BY AUTOMATED COUNT: 14.3 % (ref 12–15)
ERYTHROCYTE [SEDIMENTATION RATE] IN BLOOD: 7 MM/HR (ref 0–20)
ETEC LTA+ST1A+ST1B TOX ST NAA+NON-PROBE: NOT DETECTED
G LAMBLIA DNA SPEC QL NAA+PROBE: NOT DETECTED
GFR SERPL CREATININE-BSD FRML MDRD: 87 ML/MIN/1.73
GLOBULIN UR ELPH-MCNC: 2.6 GM/DL
GLUCOSE BLD-MCNC: 125 MG/DL (ref 70–100)
HBA1C MFR BLD: 5.2 %
HCT VFR BLD AUTO: 34.2 % (ref 37–47)
HGB BLD-MCNC: 11.7 G/DL (ref 12–16)
MCH RBC QN AUTO: 29.2 PG (ref 28–32)
MCHC RBC AUTO-ENTMCNC: 34.2 G/DL (ref 33–36)
MCV RBC AUTO: 85.3 FL (ref 82–98)
NOROVIRUS GI+II RNA STL QL NAA+NON-PROBE: DETECTED
P SHIGELLOIDES DNA STL QL NAA+NON-PROBE: NOT DETECTED
PLATELET # BLD AUTO: 222 10*3/MM3 (ref 130–400)
PMV BLD AUTO: 11.6 FL (ref 6–12)
POTASSIUM BLD-SCNC: 3.8 MMOL/L (ref 3.5–5.3)
PROT SERPL-MCNC: 5.8 G/DL (ref 6.3–8.7)
RBC # BLD AUTO: 4.01 10*6/MM3 (ref 4.2–5.4)
RV RNA STL NAA+PROBE: NOT DETECTED
SALMONELLA DNA SPEC QL NAA+PROBE: NOT DETECTED
SAPO I+II+IV+V RNA STL QL NAA+NON-PROBE: NOT DETECTED
SHIGELLA SP+EIEC IPAH ST NAA+NON-PROBE: NOT DETECTED
SODIUM BLD-SCNC: 138 MMOL/L (ref 135–145)
T3FREE SERPL-MCNC: 2.43 PG/ML (ref 2.77–5.27)
T4 FREE SERPL-MCNC: 1.13 NG/DL (ref 0.78–2.19)
TSH SERPL DL<=0.05 MIU/L-ACNC: 18 MIU/ML (ref 0.47–4.68)
V CHOLERAE DNA SPEC QL NAA+PROBE: NOT DETECTED
VIBRIO DNA SPEC NAA+PROBE: NOT DETECTED
WBC NRBC COR # BLD: 12.62 10*3/MM3 (ref 4.8–10.8)
YERSINIA STL CULT: NOT DETECTED

## 2017-12-11 PROCEDURE — 93010 ELECTROCARDIOGRAM REPORT: CPT | Performed by: INTERNAL MEDICINE

## 2017-12-11 PROCEDURE — 84481 FREE ASSAY (FT-3): CPT | Performed by: NURSE PRACTITIONER

## 2017-12-11 PROCEDURE — 84439 ASSAY OF FREE THYROXINE: CPT | Performed by: NURSE PRACTITIONER

## 2017-12-11 PROCEDURE — 87507 IADNA-DNA/RNA PROBE TQ 12-25: CPT | Performed by: NURSE PRACTITIONER

## 2017-12-11 PROCEDURE — 85651 RBC SED RATE NONAUTOMATED: CPT | Performed by: SPECIALIST

## 2017-12-11 PROCEDURE — 80053 COMPREHEN METABOLIC PANEL: CPT | Performed by: SPECIALIST

## 2017-12-11 PROCEDURE — 84443 ASSAY THYROID STIM HORMONE: CPT | Performed by: NURSE PRACTITIONER

## 2017-12-11 PROCEDURE — 25010000002 ONDANSETRON PER 1 MG: Performed by: SPECIALIST

## 2017-12-11 PROCEDURE — 25010000002 CEFOXITIN PER 1 G: Performed by: SPECIALIST

## 2017-12-11 PROCEDURE — 25010000002 ENOXAPARIN PER 10 MG: Performed by: SPECIALIST

## 2017-12-11 PROCEDURE — 85027 COMPLETE CBC AUTOMATED: CPT | Performed by: SPECIALIST

## 2017-12-11 PROCEDURE — 25010000002 MORPHINE SULFATE (PF) 2 MG/ML SOLUTION: Performed by: SPECIALIST

## 2017-12-11 PROCEDURE — 83036 HEMOGLOBIN GLYCOSYLATED A1C: CPT | Performed by: NURSE PRACTITIONER

## 2017-12-11 RX ORDER — LEVOTHYROXINE SODIUM 137 UG/1
137 TABLET ORAL DAILY
Status: DISCONTINUED | OUTPATIENT
Start: 2017-12-12 | End: 2017-12-13 | Stop reason: HOSPADM

## 2017-12-11 RX ADMIN — CEFOXITIN 2 G: 2 INJECTION, POWDER, FOR SOLUTION INTRAVENOUS at 08:07

## 2017-12-11 RX ADMIN — LATANOPROST 1 DROP: 50 SOLUTION OPHTHALMIC at 22:08

## 2017-12-11 RX ADMIN — CLONIDINE HYDROCHLORIDE 0.3 MG: 0.3 TABLET ORAL at 21:25

## 2017-12-11 RX ADMIN — METOPROLOL TARTRATE 100 MG: 100 TABLET ORAL at 08:07

## 2017-12-11 RX ADMIN — MORPHINE SULFATE 1 MG: 2 INJECTION, SOLUTION INTRAMUSCULAR; INTRAVENOUS at 03:16

## 2017-12-11 RX ADMIN — CEFOXITIN 2 G: 2 INJECTION, POWDER, FOR SOLUTION INTRAVENOUS at 14:33

## 2017-12-11 RX ADMIN — METOPROLOL TARTRATE 100 MG: 100 TABLET ORAL at 21:26

## 2017-12-11 RX ADMIN — ENOXAPARIN SODIUM 30 MG: 30 INJECTION SUBCUTANEOUS at 08:07

## 2017-12-11 RX ADMIN — CLONIDINE HYDROCHLORIDE 0.3 MG: 0.3 TABLET ORAL at 12:09

## 2017-12-11 RX ADMIN — FAMOTIDINE 20 MG: 10 INJECTION, SOLUTION INTRAVENOUS at 21:25

## 2017-12-11 RX ADMIN — ENOXAPARIN SODIUM 30 MG: 30 INJECTION SUBCUTANEOUS at 21:25

## 2017-12-11 RX ADMIN — METRONIDAZOLE 500 MG: 500 INJECTION, SOLUTION INTRAVENOUS at 18:13

## 2017-12-11 RX ADMIN — DILTIAZEM HYDROCHLORIDE 60 MG: 60 TABLET, FILM COATED ORAL at 08:07

## 2017-12-11 RX ADMIN — CEFOXITIN 2 G: 2 INJECTION, POWDER, FOR SOLUTION INTRAVENOUS at 21:25

## 2017-12-11 RX ADMIN — METRONIDAZOLE 500 MG: 500 INJECTION, SOLUTION INTRAVENOUS at 05:49

## 2017-12-11 RX ADMIN — MORPHINE SULFATE 1 MG: 2 INJECTION, SOLUTION INTRAMUSCULAR; INTRAVENOUS at 09:18

## 2017-12-11 RX ADMIN — DILTIAZEM HYDROCHLORIDE 60 MG: 60 TABLET, FILM COATED ORAL at 21:26

## 2017-12-11 RX ADMIN — CEFOXITIN 2 G: 2 INJECTION, POWDER, FOR SOLUTION INTRAVENOUS at 03:47

## 2017-12-11 RX ADMIN — FAMOTIDINE 20 MG: 10 INJECTION, SOLUTION INTRAVENOUS at 08:07

## 2017-12-11 RX ADMIN — ONDANSETRON 8 MG: 8 TABLET, ORALLY DISINTEGRATING ORAL at 10:30

## 2017-12-11 RX ADMIN — AMITRIPTYLINE HYDROCHLORIDE 50 MG: 25 TABLET, FILM COATED ORAL at 21:25

## 2017-12-11 RX ADMIN — POTASSIUM CHLORIDE, DEXTROSE MONOHYDRATE AND SODIUM CHLORIDE 125 ML/HR: 150; 5; 450 INJECTION, SOLUTION INTRAVENOUS at 21:33

## 2017-12-11 RX ADMIN — METRONIDAZOLE 500 MG: 500 INJECTION, SOLUTION INTRAVENOUS at 12:09

## 2017-12-11 RX ADMIN — ONDANSETRON 8 MG: 2 INJECTION INTRAMUSCULAR; INTRAVENOUS at 16:29

## 2017-12-11 RX ADMIN — CLONIDINE HYDROCHLORIDE 0.3 MG: 0.3 TABLET ORAL at 05:49

## 2017-12-11 RX ADMIN — LOSARTAN POTASSIUM 25 MG: 25 TABLET, FILM COATED ORAL at 08:07

## 2017-12-11 RX ADMIN — SODIUM CHLORIDE, POTASSIUM CHLORIDE, SODIUM LACTATE AND CALCIUM CHLORIDE 1000 ML: 600; 310; 30; 20 INJECTION, SOLUTION INTRAVENOUS at 11:10

## 2017-12-11 NOTE — PROGRESS NOTES
[facility] Medicine Services  INPATIENT PROGRESS NOTE    Length of Stay: 1  Date of Admission: 12/10/2017  Primary Care Physician: No Known Provider    Subjective   Chief Complaint: follow up  colitis  HPI   Pt had diarrhea this am. Brown liquid stools. States pain has moved to lower abdomen and is less severe. No chest pain or shortness of breath. States she feels much better. On full liquids now.    Review of Systems   All pertinent negatives and positives are as above. All other systems have been reviewed and are negative unless otherwise stated.     Objective    Temp:  [97.6 °F (36.4 °C)-99 °F (37.2 °C)] 98.6 °F (37 °C)  Heart Rate:  [50-61] 50  Resp:  [16] 16  BP: (127-155)/(72-93) 133/93  Physical Exam   Constitutional: She is oriented to person, place, and time. She appears well-developed and well-nourished.   HENT:   Head: Normocephalic and atraumatic.   Eyes: Conjunctivae and EOM are normal. Pupils are equal, round, and reactive to light.   Neck: Neck supple. No JVD present. No thyromegaly present.   Cardiovascular: Normal rate, regular rhythm, normal heart sounds and intact distal pulses.  Exam reveals no gallop and no friction rub.    No murmur heard.  Pulmonary/Chest: Effort normal and breath sounds normal. No respiratory distress. She has no wheezes. She has no rales. She exhibits no tenderness.   Abdominal: Soft. Bowel sounds are normal. She exhibits no distension. There is tenderness (mild bilateral lower abdominal tenderness. ). There is no rebound and no guarding.   Musculoskeletal: Normal range of motion. She exhibits no edema, tenderness or deformity.   Lymphadenopathy:     She has no cervical adenopathy.   Neurological: She is alert and oriented to person, place, and time. She displays normal reflexes. No cranial nerve deficit. She exhibits normal muscle tone.   Skin: Skin is warm and dry. No rash noted.   Psychiatric: She has a normal mood and affect. Her  behavior is normal. Judgment and thought content normal.     Results Review:  I have reviewed the labs, radiology results, and diagnostic studies.    Laboratory Data:     Results from last 7 days  Lab Units 12/11/17  0323 12/10/17  0048   WBC 10*3/mm3 12.62* 25.07*   HEMOGLOBIN g/dL 11.7* 15.0   HEMATOCRIT % 34.2* 45.5   PLATELETS 10*3/mm3 222 268       Results from last 7 days  Lab Units 12/11/17  0323 12/10/17  0048   SODIUM mmol/L 138 144   POTASSIUM mmol/L 3.8 3.7   CHLORIDE mmol/L 104 104   CO2 mmol/L 25.0 28.0   BUN mg/dL 8 15   CREATININE mg/dL 0.71 0.81   CALCIUM mg/dL 8.6 9.5   BILIRUBIN mg/dL 0.3 0.5   ALK PHOS U/L 65 103   ALT (SGPT) U/L 50 63*   AST (SGOT) U/L 33 47*   GLUCOSE mg/dL 125* 93     Culture Data:   Blood Culture   Date Value Ref Range Status   12/10/2017 No growth at 24 hours  Preliminary   12/10/2017 No growth at 24 hours  Preliminary     Urine Culture   Date Value Ref Range Status   12/10/2017 No growth at 24 hours  Preliminary     Radiology Data:   Imaging Results (last 24 hours)     ** No results found for the last 24 hours. **        I have reviewed the patient current medications.     Assessment/Plan   Assessment:  1. Colitis  2. Leukocytosis  3. Lactic Acidosis    4. Chest pain- atypical, likely secondary to #1, negative troponin  5. Transaminitis, likely secondary to #1  6. Hypertension, suspect this may be worse than normal given her abdominal pain  7. History of hypothyroidism  8. History of asthma  9. Obesity- BMI 38  10. Elevated TSH    Plan  1. Await Gi panel it was being collected when I was in the room.   2. Cefoxitin and IV flagyl day 2  3. Continue current antihypertensives. Her blood pressure is improved.   4. Repeat labs in am  5. Encouraged activity.   6. Add t3, t4 to labs drawn this am.   Discharge Planning: I expect the patient to be discharged to home in ? days.  Discussed above plan with NP.  AGree with above.  Reviewed note from Dr. Cox who recs abx, full liquid  diet and fluid bolus.  MD Marielos Gonzalez, APRN   12/11/17   1:28 PM

## 2017-12-11 NOTE — PLAN OF CARE
Problem: Patient Care Overview (Adult)  Goal: Plan of Care Review  Outcome: Ongoing (interventions implemented as appropriate)    12/11/17 0226   Coping/Psychosocial Response Interventions   Plan Of Care Reviewed With patient   Patient Care Overview   Progress improving   Outcome Evaluation   Outcome Summary/Follow up Plan Currently, no c/o pain. Up ad stalin. Voiding. Still need stool sample. IVF and IV abx. Clear liquids, tolerating well. Will continue to monitor.          Problem: Infection, Risk/Actual (Adult)  Goal: Identify Related Risk Factors and Signs and Symptoms  Outcome: Ongoing (interventions implemented as appropriate)    12/11/17 0226   Infection, Risk/Actual   Infection, Risk/Actual: Related Risk Factors treatment plan   Signs and Symptoms (Infection, Risk/Actual) (none present at this time)       Goal: Infection Prevention/Resolution  Outcome: Ongoing (interventions implemented as appropriate)    12/11/17 0226   Infection, Risk/Actual (Adult)   Infection Prevention/Resolution making progress toward outcome         Problem: Pain, Acute (Adult)  Goal: Acceptable Pain Control/Comfort Level  Outcome: Ongoing (interventions implemented as appropriate)    12/11/17 0226   Pain, Acute (Adult)   Acceptable Pain Control/Comfort Level making progress toward outcome

## 2017-12-11 NOTE — PROGRESS NOTES
LOS: 1 day   Patient Care Team:  No Known Provider as PCP - General (Pharmacy)    Chief Complaint:  Colitis transverse colon and descending colon   Subjective     Subjective     Patient feels much improved abdominal discomfort is resolving she is passing some gas she had 2 bowel movements history none today's to this point.    Objective      Objective     Vital Signs  Temp:  [98.1 °F (36.7 °C)-99 °F (37.2 °C)] 98.1 °F (36.7 °C)  Heart Rate:  [50-70] 52  Resp:  [16] 16  BP: (127-155)/() 128/72    Intake & Output (last 3 days)       12/08 0701 - 12/09 0700 12/09 0701 - 12/10 0700 12/10 0701 - 12/11 0700 12/11 0701 - 12/12 0700    P.O.   720     I.V. (mL/kg)   1547.3 (15.9)     IV Piggyback  1000 400     Total Intake(mL/kg)  1000 (9.6) 2667.3 (27.4)     Urine (mL/kg/hr)   2600 (1.1)     Total Output     2600      Net   +1000 +67.3              Unmeasured Stool Occurrence  2 x            Physical Exam:     General Appearance:    Alert, cooperative, in no acute distress   Lungs:     Clear to auscultation,respirations regular, even and                  unlabored    Heart:    Regular rhythm and normal rate, normal S1 and S2, no            murmur, no gallop, no rub, no click   Chest Wall:    No abnormalities observed   Abdomen:    Occasional bowel sounds, no masses, nontender, no peritoneal signs, white count is reduced from 25,000-12, electrolytes within normal limits.        Results Review:     I reviewed the patient's new clinical results.  I reviewed the patient's new imaging results and agree with the interpretation.      Results from last 7 days  Lab Units 12/11/17  0323 12/10/17  0048   WBC 10*3/mm3 12.62* 25.07*   HEMOGLOBIN g/dL 11.7* 15.0   HEMATOCRIT % 34.2* 45.5   PLATELETS 10*3/mm3 222 268          Results from last 7 days  Lab Units 12/11/17  0323 12/10/17  0048   SODIUM mmol/L 138 144   POTASSIUM mmol/L 3.8 3.7   CHLORIDE mmol/L 104 104   CO2 mmol/L 25.0 28.0   BUN mg/dL 8 15   CREATININE mg/dL  0.71 0.81   CALCIUM mg/dL 8.6 9.5   BILIRUBIN mg/dL 0.3 0.5   ALK PHOS U/L 65 103   ALT (SGPT) U/L 50 63*   AST (SGOT) U/L 33 47*   GLUCOSE mg/dL 125* 93       Assessment/Plan     Assessment/Plan     Active Problems:    Colitis      We will further treatment with a fluid bolus, begin full liquid diet, overall improved continue IV antibiotics fluid bolus and monitor.    Rm Cox MD  12/11/17  8:08 AM      Time: time spent with patient 15 minutes     EMR Dragon/Transcription disclaimer: Much of this encounter note is an electronic transcription/translation of spoken language to printed text. The electronic translation of spoken language may permit erroneous, or at times, nonsensical words or phrases to be inadvertently transcribed; although I have reviewed the note for such errors, some may still exist.

## 2017-12-11 NOTE — PLAN OF CARE
Problem: Patient Care Overview (Adult)  Goal: Plan of Care Review  Outcome: Ongoing (interventions implemented as appropriate)  Pt c/o nausea this shift and wont try full liquids until her nausea is better. meds given as ordered  Goal: Adult Individualization and Mutuality  Outcome: Ongoing (interventions implemented as appropriate)    Problem: Infection, Risk/Actual (Adult)  Goal: Identify Related Risk Factors and Signs and Symptoms  Outcome: Ongoing (interventions implemented as appropriate)  Goal: Infection Prevention/Resolution  Outcome: Ongoing (interventions implemented as appropriate)    Problem: Pain, Acute (Adult)  Goal: Identify Related Risk Factors and Signs and Symptoms  Outcome: Ongoing (interventions implemented as appropriate)

## 2017-12-12 ENCOUNTER — PREP FOR SURGERY (OUTPATIENT)
Dept: OTHER | Facility: HOSPITAL | Age: 47
End: 2017-12-12

## 2017-12-12 ENCOUNTER — TELEPHONE (OUTPATIENT)
Dept: GASTROENTEROLOGY | Facility: CLINIC | Age: 47
End: 2017-12-12

## 2017-12-12 DIAGNOSIS — K52.9 COLITIS: Primary | ICD-10-CM

## 2017-12-12 LAB
ALBUMIN SERPL-MCNC: 3.6 G/DL (ref 3.5–5)
ALBUMIN/GLOB SERPL: 1.2 G/DL (ref 1.1–2.5)
ALP SERPL-CCNC: 68 U/L (ref 24–120)
ALT SERPL W P-5'-P-CCNC: 56 U/L (ref 0–54)
ANION GAP SERPL CALCULATED.3IONS-SCNC: 8 MMOL/L (ref 4–13)
AST SERPL-CCNC: 42 U/L (ref 7–45)
BACTERIA SPEC AEROBE CULT: ABNORMAL
BACTERIA SPEC AEROBE CULT: ABNORMAL
BILIRUB SERPL-MCNC: 0.4 MG/DL (ref 0.1–1)
BUN BLD-MCNC: 6 MG/DL (ref 5–21)
BUN/CREAT SERPL: 7.6 (ref 7–25)
CALCIUM SPEC-SCNC: 9 MG/DL (ref 8.4–10.4)
CHLORIDE SERPL-SCNC: 99 MMOL/L (ref 98–110)
CO2 SERPL-SCNC: 31 MMOL/L (ref 24–31)
CREAT BLD-MCNC: 0.79 MG/DL (ref 0.5–1.4)
DEPRECATED RDW RBC AUTO: 43 FL (ref 40–54)
ERYTHROCYTE [DISTWIDTH] IN BLOOD BY AUTOMATED COUNT: 13.9 % (ref 12–15)
ERYTHROCYTE [SEDIMENTATION RATE] IN BLOOD: 8 MM/HR (ref 0–20)
GFR SERPL CREATININE-BSD FRML MDRD: 77 ML/MIN/1.73
GLOBULIN UR ELPH-MCNC: 2.9 GM/DL
GLUCOSE BLD-MCNC: 93 MG/DL (ref 70–100)
HCT VFR BLD AUTO: 37.8 % (ref 37–47)
HGB BLD-MCNC: 12.7 G/DL (ref 12–16)
MCH RBC QN AUTO: 28.7 PG (ref 28–32)
MCHC RBC AUTO-ENTMCNC: 33.6 G/DL (ref 33–36)
MCV RBC AUTO: 85.3 FL (ref 82–98)
PLATELET # BLD AUTO: 238 10*3/MM3 (ref 130–400)
PMV BLD AUTO: 11.4 FL (ref 6–12)
POTASSIUM BLD-SCNC: 4.2 MMOL/L (ref 3.5–5.3)
PROT SERPL-MCNC: 6.5 G/DL (ref 6.3–8.7)
RBC # BLD AUTO: 4.43 10*6/MM3 (ref 4.2–5.4)
SODIUM BLD-SCNC: 138 MMOL/L (ref 135–145)
WBC NRBC COR # BLD: 10.63 10*3/MM3 (ref 4.8–10.8)

## 2017-12-12 PROCEDURE — 80053 COMPREHEN METABOLIC PANEL: CPT | Performed by: SPECIALIST

## 2017-12-12 PROCEDURE — 85027 COMPLETE CBC AUTOMATED: CPT | Performed by: SPECIALIST

## 2017-12-12 PROCEDURE — 25010000002 ENOXAPARIN PER 10 MG: Performed by: SPECIALIST

## 2017-12-12 PROCEDURE — 85651 RBC SED RATE NONAUTOMATED: CPT | Performed by: SPECIALIST

## 2017-12-12 PROCEDURE — 25010000002 ONDANSETRON PER 1 MG: Performed by: SPECIALIST

## 2017-12-12 PROCEDURE — 99222 1ST HOSP IP/OBS MODERATE 55: CPT | Performed by: INTERNAL MEDICINE

## 2017-12-12 PROCEDURE — 25010000002 CEFOXITIN PER 1 G: Performed by: SPECIALIST

## 2017-12-12 PROCEDURE — 25010000002 KETOROLAC TROMETHAMINE PER 15 MG: Performed by: NURSE PRACTITIONER

## 2017-12-12 RX ORDER — KETOROLAC TROMETHAMINE 30 MG/ML
15 INJECTION, SOLUTION INTRAMUSCULAR; INTRAVENOUS EVERY 6 HOURS PRN
Status: DISCONTINUED | OUTPATIENT
Start: 2017-12-12 | End: 2017-12-13 | Stop reason: HOSPADM

## 2017-12-12 RX ORDER — POLYETHYLENE GLYCOL 3350, SODIUM CHLORIDE, SODIUM BICARBONATE, POTASSIUM CHLORIDE 420; 11.2; 5.72; 1.48 G/4L; G/4L; G/4L; G/4L
4000 POWDER, FOR SOLUTION ORAL SEE ADMIN INSTRUCTIONS
Qty: 4000 ML | Refills: 0 | Status: ON HOLD | OUTPATIENT
Start: 2017-12-12 | End: 2020-12-29

## 2017-12-12 RX ADMIN — ENOXAPARIN SODIUM 30 MG: 30 INJECTION SUBCUTANEOUS at 08:43

## 2017-12-12 RX ADMIN — METRONIDAZOLE 500 MG: 500 INJECTION, SOLUTION INTRAVENOUS at 11:14

## 2017-12-12 RX ADMIN — ENOXAPARIN SODIUM 30 MG: 30 INJECTION SUBCUTANEOUS at 20:40

## 2017-12-12 RX ADMIN — POTASSIUM CHLORIDE, DEXTROSE MONOHYDRATE AND SODIUM CHLORIDE 125 ML/HR: 150; 5; 450 INJECTION, SOLUTION INTRAVENOUS at 08:41

## 2017-12-12 RX ADMIN — ONDANSETRON 8 MG: 8 TABLET, ORALLY DISINTEGRATING ORAL at 13:49

## 2017-12-12 RX ADMIN — DILTIAZEM HYDROCHLORIDE 60 MG: 60 TABLET, FILM COATED ORAL at 08:43

## 2017-12-12 RX ADMIN — LEVOTHYROXINE SODIUM 137 MCG: 137 TABLET ORAL at 08:43

## 2017-12-12 RX ADMIN — FAMOTIDINE 20 MG: 20 TABLET ORAL at 08:42

## 2017-12-12 RX ADMIN — METOPROLOL TARTRATE 100 MG: 100 TABLET ORAL at 08:43

## 2017-12-12 RX ADMIN — FAMOTIDINE 20 MG: 10 INJECTION, SOLUTION INTRAVENOUS at 20:34

## 2017-12-12 RX ADMIN — METRONIDAZOLE 500 MG: 500 INJECTION, SOLUTION INTRAVENOUS at 17:02

## 2017-12-12 RX ADMIN — METRONIDAZOLE 500 MG: 500 INJECTION, SOLUTION INTRAVENOUS at 00:01

## 2017-12-12 RX ADMIN — KETOROLAC TROMETHAMINE 15 MG: 30 INJECTION, SOLUTION INTRAMUSCULAR at 14:21

## 2017-12-12 RX ADMIN — AMITRIPTYLINE HYDROCHLORIDE 50 MG: 25 TABLET, FILM COATED ORAL at 20:33

## 2017-12-12 RX ADMIN — CEFOXITIN 2 G: 2 INJECTION, POWDER, FOR SOLUTION INTRAVENOUS at 20:34

## 2017-12-12 RX ADMIN — DILTIAZEM HYDROCHLORIDE 60 MG: 60 TABLET, FILM COATED ORAL at 20:33

## 2017-12-12 RX ADMIN — ONDANSETRON 8 MG: 2 INJECTION INTRAMUSCULAR; INTRAVENOUS at 08:41

## 2017-12-12 RX ADMIN — CLONIDINE HYDROCHLORIDE 0.3 MG: 0.3 TABLET ORAL at 22:12

## 2017-12-12 RX ADMIN — CEFOXITIN 2 G: 2 INJECTION, POWDER, FOR SOLUTION INTRAVENOUS at 08:42

## 2017-12-12 RX ADMIN — CEFOXITIN 2 G: 2 INJECTION, POWDER, FOR SOLUTION INTRAVENOUS at 14:21

## 2017-12-12 RX ADMIN — LATANOPROST 1 DROP: 50 SOLUTION OPHTHALMIC at 20:34

## 2017-12-12 RX ADMIN — METOPROLOL TARTRATE 100 MG: 100 TABLET ORAL at 20:33

## 2017-12-12 RX ADMIN — LOSARTAN POTASSIUM 25 MG: 25 TABLET, FILM COATED ORAL at 08:43

## 2017-12-12 RX ADMIN — METRONIDAZOLE 500 MG: 500 INJECTION, SOLUTION INTRAVENOUS at 05:46

## 2017-12-12 RX ADMIN — CEFOXITIN 2 G: 2 INJECTION, POWDER, FOR SOLUTION INTRAVENOUS at 03:23

## 2017-12-12 RX ADMIN — CLONIDINE HYDROCHLORIDE 0.3 MG: 0.3 TABLET ORAL at 05:46

## 2017-12-12 NOTE — PROGRESS NOTES
LOS: 2 days   Patient Care Team:  No Known Provider as PCP - General (Pharmacy)    Chief Complaint: Central abdominal pain   Subjective     Subjective     Hospital day #2 for patient with central abdominal pain and noted to have colitis in her transverse and descending colon with a white count of 25,000.  She still complains of some central pain, some nausea.  Her white count is reduced to 10,000 from 25.  Normal electrolytes.    Objective      Objective     Vital Signs  Temp:  [97.6 °F (36.4 °C)-99.5 °F (37.5 °C)] 98.4 °F (36.9 °C)  Heart Rate:  [52-79] 52  Resp:  [16-20] 20  BP: (141-171)/(63-93) 152/89    Intake & Output (last 3 days)       12/09 0701 - 12/10 0700 12/10 0701 - 12/11 0700 12/11 0701 - 12/12 0700 12/12 0701 - 12/13 0700    P.O.  720  200    I.V. (mL/kg)  1547.3 (15.9) 1919.2 (19.7)     IV Piggyback 1000 400 100     Total Intake(mL/kg) 1000 (9.6) 2667.3 (27.4) 2019.2 (20.7) 200 (2.1)    Urine (mL/kg/hr)  2600 (1.1) 5950 (2.5) 400 (0.6)    Stool   200 (0.1)     Total Output   2600 6150 400    Net +1000 +67.3 -4130.8 -200            Unmeasured Stool Occurrence 2 x  1 x           Physical Exam:     General Appearance:    Alert, cooperative, in no acute distress   Lungs:     Clear to auscultation,respirations regular, even and                  unlabored    Heart:    Regular rhythm and normal rate, normal S1 and S2, no            murmur, no gallop, no rub, no click   Chest Wall:    No abnormalities observed   Abdomen:    Normal bowel sounds no masses nontender.           Results Review:     I reviewed the patient's new clinical results.  I reviewed the patient's new imaging results and agree with the interpretation.      Results from last 7 days  Lab Units 12/12/17  0428 12/11/17  0323 12/10/17  0048   WBC 10*3/mm3 10.63 12.62* 25.07*   HEMOGLOBIN g/dL 12.7 11.7* 15.0   HEMATOCRIT % 37.8 34.2* 45.5   PLATELETS 10*3/mm3 238 222 268          Results from last 7 days  Lab Units 12/12/17  1204  12/11/17  0323 12/10/17  0048   SODIUM mmol/L 138 138 144   POTASSIUM mmol/L 4.2 3.8 3.7   CHLORIDE mmol/L 99 104 104   CO2 mmol/L 31.0 25.0 28.0   BUN mg/dL 6 8 15   CREATININE mg/dL 0.79 0.71 0.81   CALCIUM mg/dL 9.0 8.6 9.5   BILIRUBIN mg/dL 0.4 0.3 0.5   ALK PHOS U/L 68 65 103   ALT (SGPT) U/L 56* 50 63*   AST (SGOT) U/L 42 33 47*   GLUCOSE mg/dL 93 125* 93       Assessment/Plan     Assessment/Plan     Active Problems:    Colitis      Patient with colitis etiology unknown several years since her previous colonoscopy was GI medicine to see and evaluate and consider colonoscopy now or in the near future.    Rm Cox MD  12/12/17  1:26 PM      Time: time spent with patient 15 minutes     EMR Dragon/Transcription disclaimer: Much of this encounter note is an electronic transcription/translation of spoken language to printed text. The electronic translation of spoken language may permit erroneous, or at times, nonsensical words or phrases to be inadvertently transcribed; although I have reviewed the note for such errors, some may still exist.

## 2017-12-12 NOTE — CONSULTS
[facility] Gastroenterology  Initial Inpatient Consult Note    Referring Provider: Rm Cox MD    Reason for Consultation: Diarrhea    Subjective     History of present illness:  She is a 47-year-old female in relatively stable health.  Approximately 10 days ago she became ill with acute nausea vomiting illness.  Her first symptom was diarrhea.  She did have some nausea and vomiting with it.  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  She initially went to [facility] and she was treated for upper respiratory infection.  According to the patient, there is a question whether she aspirated some of her emesis.  She has no respiratory complaints at this time.    After spending about 5 days of [facility] hospital she went home.  Her  states that she immediately went to work cleaning the house and doing laundry.  About 3 hours later she developed cramping with emesis and abdominal discomfort.  Then she called the ambulance and she was brought to [facility].  She denies ever having syncope.  She denies been lightheaded or dizzy.  She has had cramps with her diarrhea.  She states she has emesis averaging 1 time a day for the last 8 days.  She is feeling better today.  Her diarrhea has subsided significantly.    Studies to date have included GI panel which was positive for Noravirus.  CT scan showed some thickening of her transverse colon down to her descending colon.  She states she was having discomfort in left upper quadrant but that subsided.  She has a little discomfort down her suprapubic area.  She does recall having fever but she does not remember checking her temperature at home.  She has been afebrile here in the hospital.  Her white blood cell count has gone from 25 to now 12    Past Medical History:  Past Medical History:   Diagnosis Date   • Disease of thyroid gland    • Hypertension        Past Surgical History:  Past Surgical  History:   Procedure Laterality Date   • TUBAL ABDOMINAL LIGATION          Social History:   Social History   Substance Use Topics   • Smoking status: Never Smoker   • Smokeless tobacco: Not on file   • Alcohol use No        Family History:  History reviewed. No pertinent family history.    Home Meds:  Prescriptions Prior to Admission   Medication Sig Dispense Refill Last Dose   • albuterol (PROVENTIL HFA;VENTOLIN HFA) 108 (90 Base) MCG/ACT inhaler Inhale 2 puffs Every 4 (Four) Hours As Needed for Wheezing.      • amitriptyline (ELAVIL) 50 MG tablet Take 50 mg by mouth Every Night.      • aspirin 325 MG tablet Take 325 mg by mouth Daily.      • CloNIDine (CATAPRES) 0.3 MG tablet Take 0.3 mg by mouth 3 (Three) Times a Day.      • cyclobenzaprine (FLEXERIL) 10 MG tablet Take 10 mg by mouth 3 (Three) Times a Day As Needed for Muscle Spasms.      • diltiaZEM (CARDIZEM) 60 MG tablet Take 60 mg by mouth 2 (Two) Times a Day.      • latanoprost (XALATAN) 0.005 % ophthalmic solution 1 drop Every Night.      • levothyroxine (SYNTHROID, LEVOTHROID) 125 MCG tablet Take 125 mcg by mouth Daily.      • Losartan Potassium (COZAAR PO) Take  by mouth.      • metoprolol tartrate (LOPRESSOR) 100 MG tablet Take 100 mg by mouth 2 (Two) Times a Day.      • nitroglycerin (NITROSTAT) 0.4 MG SL tablet Place 0.4 mg under the tongue Every 5 (Five) Minutes As Needed for Chest Pain. Take no more than 3 doses in 15 minutes.          Current Meds:  Current Facility-Administered Medications   Medication Dose Route Frequency Provider Last Rate Last Dose   • albuterol (PROVENTIL) nebulizer solution 0.5% 2.5 mg/0.5mL  2.5 mg Nebulization Q6H PRN Rm Cox MD       • amitriptyline (ELAVIL) tablet 50 mg  50 mg Oral Nightly Rm Cox MD   50 mg at 12/11/17 2125   • cefoxitin (MEFOXIN) 2 g/100 mL 0.9% NS (MBP)  2 g Intravenous Q6H Rm Cox MD 0 mL/hr at 12/11/17 2345 2 g at 12/12/17 1421   • CloNIDine (CATAPRES) tablet 0.3 mg  0.3 mg  Oral Q8H Rm Cox MD   0.3 mg at 12/12/17 0546   • cyclobenzaprine (FLEXERIL) tablet 10 mg  10 mg Oral TID PRN Rm Cox MD       • diltiaZEM (CARDIZEM) tablet 60 mg  60 mg Oral Q12H Rm Cox MD   60 mg at 12/12/17 0843   • enoxaparin (LOVENOX) syringe 30 mg  30 mg Subcutaneous Q12H mR Cox MD   30 mg at 12/12/17 0843   • famotidine (PEPCID) injection 20 mg  20 mg Intravenous Q12H mR Cox MD   20 mg at 12/11/17 2125    Or   • famotidine (PEPCID) tablet 20 mg  20 mg Oral Q12H Rm Cox MD   20 mg at 12/12/17 0842   • hydrALAZINE (APRESOLINE) injection 10 mg  10 mg Intravenous Q6H PRN IRINA Morales       • ketorolac (TORADOL) injection 15 mg  15 mg Intravenous Q6H PRN Marielos Wiggins APRN   15 mg at 12/12/17 1421   • latanoprost (XALATAN) 0.005 % ophthalmic solution 1 drop  1 drop Both Eyes Nightly Rm Cox MD   1 drop at 12/11/17 2208   • levothyroxine (SYNTHROID, LEVOTHROID) tablet 137 mcg  137 mcg Oral Daily Marielos Wiggins APRN   137 mcg at 12/12/17 0843   • losartan (COZAAR) tablet 25 mg  25 mg Oral Q24H Rm Cox MD   25 mg at 12/12/17 0843   • metoprolol tartrate (LOPRESSOR) tablet 100 mg  100 mg Oral Q12H Rm Cox MD   100 mg at 12/12/17 0843   • metroNIDAZOLE (FLAGYL) IVPB 500 mg  500 mg Intravenous Q6H Rm Cox MD 0 mL/hr at 12/12/17 0300 500 mg at 12/12/17 1114   • Morphine sulfate (PF) injection 1 mg  1 mg Intravenous Q4H PRN Rm Cox MD        And   • naloxone (NARCAN) injection 0.4 mg  0.4 mg Intravenous Q5 Min PRN Rm Cox MD       • Morphine sulfate (PF) injection 1 mg  1 mg Intravenous Q4H PRN Rm Cox MD   1 mg at 12/11/17 0918    And   • naloxone (NARCAN) injection 0.4 mg  0.4 mg Intravenous Q5 Min PRN Rm Cox MD       • nitroglycerin (NITROSTAT) SL tablet 0.4 mg  0.4 mg Sublingual Q5 Min PRN Rm Cox MD       • ondansetron (ZOFRAN) 8 mg/50 mL NS IVPB  8  mg Intravenous Q6H PRN Rm Cox MD   8 mg at 12/12/17 0841   • ondansetron ODT (ZOFRAN-ODT) disintegrating tablet 8 mg  8 mg Oral Q6H PRN Rm Cox MD   8 mg at 12/12/17 1349   • sodium chloride 0.9 % flush 1-10 mL  1-10 mL Intravenous PRN Rm Cox MD       • sucralfate (CARAFATE) 1 GM/10ML suspension 1 g  1 g Oral Q6H Rm Cox MD           Allergies:  No Known Allergies    Review of Systems   Review of Systems   Constitutional: Positive for fever. Negative for unexpected weight change.   HENT: Negative for sore throat and trouble swallowing.    Eyes: Negative for photophobia.   Respiratory: Negative for shortness of breath. Wheezing: not since being in [place].    Cardiovascular: Negative for chest pain.   Gastrointestinal: Positive for diarrhea. Negative for abdominal distention, anal bleeding and rectal pain.   Endocrine: Negative for polyuria.   Genitourinary: Negative for flank pain and urgency.   Musculoskeletal: Negative for arthralgias and back pain.   Allergic/Immunologic: Negative for immunocompromised state.   Neurological: Negative for syncope.   Hematological: Negative for adenopathy.   Psychiatric/Behavioral: Negative for confusion.        Objective     Vital Signs  Temp:  [97.6 °F (36.4 °C)-99.5 °F (37.5 °C)] 98.4 °F (36.9 °C)  Heart Rate:  [52-79] 52  Resp:  [16-20] 20  BP: (141-171)/(63-93) 152/89    Physical Exam:   Physical Exam   Constitutional: She is oriented to person, place, and time. She appears well-developed and well-nourished. No distress.   HENT:   Head: Normocephalic.   Eyes: No scleral icterus.   Neck: No JVD present.   Cardiovascular: Normal rate and regular rhythm.    No murmur heard.  Pulmonary/Chest: Effort normal and breath sounds normal. No respiratory distress. She has no wheezes.   Abdominal: Soft. Bowel sounds are normal. She exhibits no distension. There is no tenderness. There is no rebound and no guarding. No hernia.   Musculoskeletal: She  exhibits no edema.   Neurological: She is alert and oriented to person, place, and time.   Skin: Skin is warm and dry. She is not diaphoretic.   Psychiatric: She has a normal mood and affect. Her behavior is normal.   Vitals reviewed.      Results Review:   I reviewed the patient's new clinical results.  I reviewed the patient's new imaging results and agree with the interpretation.  I reviewed the patient's other test results and agree with the interpretation    Lab Results (most recent)     Procedure Component Value Units Date/Time    CBC & Differential [[id number]] Collected:  12/10/17 0048    Specimen:  Blood Updated:  12/10/17 0101    Narrative:       The following orders were created for panel order CBC & Differential.  Procedure                               Abnormality         Status                     ---------                               -----------         ------                     CBC Auto Differential[[id number]]         Abnormal            Final result                 Please view results for these tests on the individual orders.    CBC Auto Differential [[id number]]  (Abnormal) Collected:  12/10/17 0048    Specimen:  Blood Updated:  12/10/17 0101     WBC 25.07 (H) 10*3/mm3      RBC 5.30 10*6/mm3      Hemoglobin 15.0 g/dL      Hematocrit 45.5 %      MCV 85.8 fL      MCH 28.3 pg      MCHC 33.0 g/dL      RDW 13.9 %      RDW-SD 42.6 fl      MPV 11.2 fL      Platelets 268 10*3/mm3      Neutrophil % 78.6 (H) %      Lymphocyte % 14.8 (L) %      Monocyte % 3.6 (L) %      Eosinophil % 0.9 %      Basophil % 0.4 %      Immature Grans % 1.7 %      Neutrophils, Absolute 19.72 (H) 10*3/mm3      Lymphocytes, Absolute 3.71 10*3/mm3      Monocytes, Absolute 0.91 10*3/mm3      Eosinophils, Absolute 0.22 10*3/mm3      Basophils, Absolute 0.09 10*3/mm3      Immature Grans, Absolute 0.42 (H) 10*3/mm3      nRBC 0.1 (H) /100 WBC     Comprehensive Metabolic Panel [[id number]]  (Abnormal) Collected:  12/10/17 0048    Specimen:   Blood Updated:  12/10/17 0107     Glucose 93 mg/dL      BUN 15 mg/dL      Creatinine 0.81 mg/dL      Sodium 144 mmol/L      Potassium 3.7 mmol/L      Chloride 104 mmol/L      CO2 28.0 mmol/L      Calcium 9.5 mg/dL      Total Protein 7.0 g/dL      Albumin 4.10 g/dL      ALT (SGPT) 63 (H) U/L      AST (SGOT) 47 (H) U/L      Alkaline Phosphatase 103 U/L      Total Bilirubin 0.5 mg/dL      eGFR Non African Amer 75 mL/min/1.73      Globulin 2.9 gm/dL      A/G Ratio 1.4 g/dL      BUN/Creatinine Ratio 18.5     Anion Gap 12.0 mmol/L     Troponin [[id number]]  (Normal) Collected:  12/10/17 0048    Specimen:  Blood Updated:  12/10/17 0117     Troponin I <0.012 ng/mL     [place] Draw [[id number]] Collected:  12/10/17 0048    Specimen:  Blood Updated:  12/10/17 0203    Narrative:       The following orders were created for panel order [place] Draw.  Procedure                               Abnormality         Status                     ---------                               -----------         ------                     Light Blue Top[[id number]]                                    Final result               Green Top (Gel)[[id number]]                                   Final result               Lavender Top[[id number]]                                      Final result               Red Top[[id number]]                                           Final result                 Please view results for these tests on the individual orders.    Light Blue Top [[id number]] Collected:  12/10/17 0048    Specimen:  Blood Updated:  12/10/17 0203     Extra Tube hold for add-on      Auto resulted       Green Top (Gel) [[id number]] Collected:  12/10/17 0048    Specimen:  Blood Updated:  12/10/17 0203     Extra Tube Hold for add-ons.      Auto resulted.       Lavender Top [[id number]] Collected:  12/10/17 0048    Specimen:  Blood Updated:  12/10/17 0203     Extra Tube hold for add-on      Auto resulted       Red Top [[id number]] Collected:  12/10/17 0048     Specimen:  Blood Updated:  12/10/17 0203     Extra Tube Hold for add-ons.      Auto resulted.       Lactic Acid, Plasma [[id number]]  (Abnormal) Collected:  12/10/17 0336    Specimen:  Blood Updated:  12/10/17 0358     Lactate 2.6 (C) mmol/L     Lipase [[id number]]  (Abnormal) Collected:  12/10/17 0609    Specimen:  Blood Updated:  12/10/17 0641     Lipase 605 (H) U/L     aPTT [[id number]]  (Normal) Collected:  12/10/17 0609    Specimen:  Blood Updated:  12/10/17 0656     PTT 33.4 seconds     Protime-INR [[id number]]  (Normal) Collected:  12/10/17 0609    Specimen:  Blood Updated:  12/10/17 0656     Protime 14.2 Seconds      INR 1.07    Procalcitonin [[id number]]  (Abnormal) Collected:  12/10/17 0609    Specimen:  Blood Updated:  12/10/17 0700     Procalcitonin 3.23 (H) ng/mL     Narrative:       SIRS, sepsis, severe sepsis, and septic shock are categorized according to the criteria of the consensus conference of the American College of Chest Physicians/Society of Critical Care Medicine.    PCT < 0.5 ng/mL     Systemic infection (sepsis) is not likely.    PCT >0.5 and < 2.0 ng/mL Systemic infection (sepsis) is possible, but other conditions are known to elevate PCT as well.    PCT > 2.0 ng/mL     Systemic infection (sepsis) is likely, unless other causes are known.      PCT > 10.0 ng/mL    Important systemic inflammatory response, almost exclusively due to severe bacterial sepsis or septic shock.    PCT values of < 0.5 ng/mL do not exclude an infection, because localized infections (without systemic signs) may be associated with such low concentrations, or a systemic infection in its initial stages (<6 hours).  Increased PCT can occur without infection.  PCT concentrations between 0.5 and 2.0 ng/mL should be interpreted taking into account the patients history.  It is recommended to retest PCT within 6-24 hours if any concentrations < 2.0 ng/mL are obtained.    Lactic Acid, Reflex Timer [[id number]] Collected:  12/10/17  0336    Specimen:  Blood Updated:  12/10/17 0701     Extra Tube Hold for add-ons.      Auto resulted.       Lactic Acid, Reflex [[id number]]  (Normal) Collected:  12/10/17 1310    Specimen:  Blood Updated:  12/10/17 1341     Lactate 1.9 mmol/L     Urinalysis With / Culture If Indicated - Urine, Clean Catch [[id number]]  (Abnormal) Collected:  12/10/17 1321    Specimen:  Urine from Urine, Clean Catch Updated:  12/10/17 1344     Color, UA Yellow     Appearance, UA Clear     pH, UA 6.0     Specific Gravity, UA >1.030 (H)     Glucose, UA Negative     Ketones, UA Negative     Bilirubin, UA Negative     Blood, UA Negative     Protein, UA Negative     Leuk Esterase, UA Negative     Nitrite, UA Positive (A)     Urobilinogen, UA 1.0 E.U./dL    Urinalysis, Microscopic Only - Urine, Clean Catch [[id number]]  (Abnormal) Collected:  12/10/17 1321    Specimen:  Urine from Urine, Clean Catch Updated:  12/10/17 1344     RBC, UA 0-2 (A) /HPF      WBC, UA 3-5 (A) /HPF      Bacteria, UA 2+ (A) /HPF      Squamous Epithelial Cells, UA 3-6 (A) /HPF      Hyaline Casts, UA 0-2 /LPF      Methodology Automated Microscopy    Comprehensive Metabolic Panel [[id number]]  (Abnormal) Collected:  12/11/17 0323    Specimen:  Blood Updated:  12/11/17 0443     Glucose 125 (H) mg/dL      BUN 8 mg/dL      Creatinine 0.71 mg/dL      Sodium 138 mmol/L      Potassium 3.8 mmol/L      Chloride 104 mmol/L      CO2 25.0 mmol/L      Calcium 8.6 mg/dL      Total Protein 5.8 (L) g/dL      Albumin 3.20 (L) g/dL      ALT (SGPT) 50 U/L      AST (SGOT) 33 U/L      Alkaline Phosphatase 65 U/L      Total Bilirubin 0.3 mg/dL      eGFR Non African Amer 87 mL/min/1.73      Globulin 2.6 gm/dL      A/G Ratio 1.2 g/dL      BUN/Creatinine Ratio 11.3     Anion Gap 9.0 mmol/L     CBC (No Diff) [[id number]]  (Abnormal) Collected:  12/11/17 0323    Specimen:  Blood Updated:  12/11/17 0448     WBC 12.62 (H) 10*3/mm3      RBC 4.01 (L) 10*6/mm3      Hemoglobin 11.7 (L) g/dL       Hematocrit 34.2 (L) %      MCV 85.3 fL      MCH 29.2 pg      MCHC 34.2 g/dL      RDW 14.3 %      RDW-SD 44.2 fl      MPV 11.6 fL      Platelets 222 10*3/mm3     Sedimentation Rate [[id number]]  (Normal) Collected:  12/11/17 0323    Specimen:  Blood Updated:  12/11/17 0458     Sed Rate 7 mm/hr     TSH [[id number]]  (Abnormal) Collected:  12/11/17 0323    Specimen:  Blood Updated:  12/11/17 0510     TSH 18.000 (H) mIU/mL     Hemoglobin A1c [[id number]] Collected:  12/11/17 0323    Specimen:  Blood Updated:  12/11/17 0732     Hemoglobin A1C 5.2 %     Narrative:       Less than 6.0           Non-Diabetic Range  6.0-7.0                 ADA Therapeutic Target  Greater than 7.0        Action Suggested    T3, Free [[id number]]  (Abnormal) Collected:  12/11/17 0323    Specimen:  Blood Updated:  12/11/17 1044     T3, Free 2.43 (L) pg/mL     T4, Free [[id number]]  (Normal) Collected:  12/11/17 0323    Specimen:  Blood Updated:  12/11/17 1044     Free T4 1.13 ng/dL     Gastrointestinal Panel, PCR - Stool, Per Rectum [[id number]]  (Abnormal) Collected:  12/11/17 1009    Specimen:  Stool from Per Rectum Updated:  12/11/17 1211     Campylobacter Not Detected     Clostridium difficile (toxin A/B) Not Detected     Plesiomonas shigelloides Not Detected     Salmonella Not Detected     Vibrio Not Detected     Vibrio cholerae Not Detected     Yersinia enterocolitica Not Detected     Enteroaggregative E. coli (EAEC) Not Detected     Enteropathogenic E. coli (EPEC) Not Detected     Enterotoxigenic E. coli (ETEC) lt/st Not Detected     Shiga-like toxin-producing E. coli (STEC) stx1/stx2 Not Detected     E. coli O157 Not Detected     Shigella/Enteroinvasive E. coli (EIEC) Not Detected     Cryptosporidium Not Detected     Cyclospora cayetanensis Not Detected     Entamoeba histolytica Not Detected     Giardia lamblia Not Detected     Adenovirus F40/41 Not Detected     Astrovirus Not Detected     Norovirus GI/GII Detected (A)     Rotavirus A Not  Detected     Sapovirus (I, II, IV or V) Not Detected    Sedimentation Rate [[id number]]  (Normal) Collected:  12/12/17 0428    Specimen:  Blood Updated:  12/12/17 0517     Sed Rate 8 mm/hr     CBC (No Diff) [[id number]]  (Normal) Collected:  12/12/17 0428    Specimen:  Blood Updated:  12/12/17 0520     WBC 10.63 10*3/mm3      RBC 4.43 10*6/mm3      Hemoglobin 12.7 g/dL      Hematocrit 37.8 %      MCV 85.3 fL      MCH 28.7 pg      MCHC 33.6 g/dL      RDW 13.9 %      RDW-SD 43.0 fl      MPV 11.4 fL      Platelets 238 10*3/mm3     Comprehensive Metabolic Panel [[id number]]  (Abnormal) Collected:  12/12/17 0428    Specimen:  Blood Updated:  12/12/17 0528     Glucose 93 mg/dL      BUN 6 mg/dL      Creatinine 0.79 mg/dL      Sodium 138 mmol/L      Potassium 4.2 mmol/L      Chloride 99 mmol/L      CO2 31.0 mmol/L      Calcium 9.0 mg/dL      Total Protein 6.5 g/dL      Albumin 3.60 g/dL      ALT (SGPT) 56 (H) U/L      AST (SGOT) 42 U/L      Alkaline Phosphatase 68 U/L      Total Bilirubin 0.4 mg/dL      eGFR Non African Amer 77 mL/min/1.73      Globulin 2.9 gm/dL      A/G Ratio 1.2 g/dL      BUN/Creatinine Ratio 7.6     Anion Gap 8.0 mmol/L     Blood Culture - Blood, [[id number]]  (Normal) Collected:  12/10/17 0603    Specimen:  Blood from Arm, Right Updated:  12/12/17 0816     Blood Culture No growth at 2 days    Blood Culture - Blood, [[id number]]  (Normal) Collected:  12/10/17 0608    Specimen:  Blood from Arm, Right Updated:  12/12/17 0816     Blood Culture No growth at 2 days    Urine Culture - Urine, Urine, Clean Catch [[id number]]  (Abnormal) Collected:  12/10/17 1321    Specimen:  Urine from Urine, Clean Catch Updated:  12/12/17 0817     Urine Culture --      <10,000 CFU/mL Mixed Gram Positive Lorna (A)    Narrative:       Probable contaminant          Radiology:  Imaging Results (last 24 hours)     ** No results found for the last 24 hours. **            Assessment/Plan     Patient Active Problem List   Diagnosis Code    • Colitis K52.9       Her history is consistent with Norvirus.  With the inflammatory changes on the CT scan in the watershed area there is a possibility she may have developed a little bit of mild ischemic colitis.  This would have been associated with volume depletion.  Although I think the likelihood of this is less likely that all of this being attributed to simply norvirus.    I discussed with patient and her  the antrum on CT scan.  She is approaching the age of 48.  I suggest she undergo a colonoscopy investigation.  I do not recommend this now why she has an acute infection.  I suggest this a month or so from now.  The purpose would be to make sure all of the inflammatory changes have resolved and there is nothing else going on as we discussed.  They expressed good understanding and she wishes to proceed with colonoscopy at a later date as an outpatient.  She would prefer to have this done at [facility] we can arrange this.    In the meantime the patient currently is taking in adequate by mouth nutrition and hydration.  I think is reasonable for her to be discharged home to continue with vigorous IV hydration and gradually increase activity.  She will need to follow with her primary care provider and we will see her in 1-2 months for colonoscopy exam.  The patient was in agreement with this.      I discussed the patients findings and my recommendations with patient and family      CECILIA Head/transcription disclaimer:  Much of this encounter note is electronic transcription/translation of spoken language to printed text.  The electronic translation of spoken language may be erroneous, or at times, nonsensical words or phrases may be inadvertently transcribed.  Although I have reviewed the note for such errors, some may still exist.      12/12/17  3:08 PM

## 2017-12-12 NOTE — PLAN OF CARE
Problem: Patient Care Overview (Adult)  Goal: Plan of Care Review  Outcome: Ongoing (interventions implemented as appropriate)    12/12/17 0245   Coping/Psychosocial Response Interventions   Plan Of Care Reviewed With patient   Patient Care Overview   Progress improving   Outcome Evaluation   Outcome Summary/Follow up Plan Pt has c/o pain X1, but is currently refusing pain meds. IVF and IV abx continue. Diet advanced to Fulls. Up ad stalin. Voiding. No BM this shift. Will continue to monitor.          Problem: Infection, Risk/Actual (Adult)  Goal: Identify Related Risk Factors and Signs and Symptoms  Outcome: Ongoing (interventions implemented as appropriate)    12/12/17 0245   Infection, Risk/Actual   Infection, Risk/Actual: Related Risk Factors treatment plan   Signs and Symptoms (Infection, Risk/Actual) other (see comments)  (none present at this time)       Goal: Infection Prevention/Resolution  Outcome: Ongoing (interventions implemented as appropriate)    12/12/17 0245   Infection, Risk/Actual (Adult)   Infection Prevention/Resolution making progress toward outcome         Problem: Pain, Acute (Adult)  Goal: Identify Related Risk Factors and Signs and Symptoms  Outcome: Ongoing (interventions implemented as appropriate)    12/12/17 0245   Pain, Acute   Related Risk Factors (Acute Pain) persistent pain   Signs and Symptoms (Acute Pain) verbalization of pain descriptors       Goal: Acceptable Pain Control/Comfort Level  Outcome: Ongoing (interventions implemented as appropriate)    12/12/17 0245   Pain, Acute (Adult)   Acceptable Pain Control/Comfort Level making progress toward outcome

## 2017-12-12 NOTE — PROGRESS NOTES
[facility] Medicine Services  INPATIENT PROGRESS NOTE    Length of Stay: 2  Date of Admission: 12/10/2017  Primary Care Physician: No Known Provider    Subjective   Chief Complaint: Follow up colitis  HPI   Pt is sleeping. States her pain has settled in her RLQ/groin area. Nursing staff reports she ate pizza last pm. Pt tells me she was able to tolerate her cream of wheat. Synthroid was increased yesterday. She is drinking better today. Explained to her that her stools were positive for norovirus.     Review of Systems   All pertinent negatives and positives are as above. All other systems have been reviewed and are negative unless otherwise stated.     Objective    Temp:  [97.6 °F (36.4 °C)-99.5 °F (37.5 °C)] 98.3 °F (36.8 °C)  Heart Rate:  [50-79] 53  Resp:  [16-18] 16  BP: (133-171)/(63-93) 141/84  Physical Exam   Constitutional: She is oriented to person, place, and time. She appears well-developed and well-nourished.   HENT:   Head: Normocephalic and atraumatic.   Eyes: Conjunctivae and EOM are normal. Pupils are equal, round, and reactive to light.   Neck: Neck supple. No JVD present. No thyromegaly present.   Cardiovascular: Normal rate, regular rhythm, normal heart sounds and intact distal pulses.  Exam reveals no gallop and no friction rub.    No murmur heard.  Pulmonary/Chest: Effort normal and breath sounds normal. No respiratory distress. She has no wheezes. She has no rales. She exhibits no tenderness.   Abdominal: Soft. Bowel sounds are normal. She exhibits no distension. There is tenderness (RLQ). There is no rebound and no guarding.   Musculoskeletal: Normal range of motion. She exhibits no edema, tenderness or deformity.   Lymphadenopathy:     She has no cervical adenopathy.   Neurological: She is alert and oriented to person, place, and time. She displays normal reflexes. No cranial nerve deficit. She exhibits normal muscle tone.   Skin: Skin is warm and dry. No  rash noted.   Psychiatric: She has a normal mood and affect. Her behavior is normal. Judgment and thought content normal.     Results Review:  I have reviewed the labs, radiology results, and diagnostic studies.    Laboratory Data:     Results from last 7 days  Lab Units 12/12/17 0428 12/11/17 0323 12/10/17  0048   WBC 10*3/mm3 10.63 12.62* 25.07*   HEMOGLOBIN g/dL 12.7 11.7* 15.0   HEMATOCRIT % 37.8 34.2* 45.5   PLATELETS 10*3/mm3 238 222 268          Results from last 7 days  Lab Units 12/12/17  0428 12/11/17  0323 12/10/17  0048   SODIUM mmol/L 138 138 144   POTASSIUM mmol/L 4.2 3.8 3.7   CHLORIDE mmol/L 99 104 104   CO2 mmol/L 31.0 25.0 28.0   BUN mg/dL 6 8 15   CREATININE mg/dL 0.79 0.71 0.81   CALCIUM mg/dL 9.0 8.6 9.5   BILIRUBIN mg/dL 0.4 0.3 0.5   ALK PHOS U/L 68 65 103   ALT (SGPT) U/L 56* 50 63*   AST (SGOT) U/L 42 33 47*   GLUCOSE mg/dL 93 125* 93     Culture Data:   Blood Culture   Date Value Ref Range Status   12/10/2017 No growth at 2 days  Preliminary   12/10/2017 No growth at 2 days  Preliminary     Urine Culture   Date Value Ref Range Status   12/10/2017 <10,000 CFU/mL Mixed Gram Positive Lorna (A)  Final     Radiology Data:   Imaging Results (last 24 hours)     ** No results found for the last 24 hours. **        I have reviewed the patient current medications.     Assessment/Plan   Assessment:  1. Colitis-viral secondary to norovirus  2. Leukocytosis-improved  3. Lactic Acidosis    4. Chest pain- atypical, likely secondary to #1, negative troponin  5. Transaminitis, likely secondary to #1  6. Hypertension-stable  7. History of hypothyroidism  8. History of asthma  9. Obesity- BMI 38  10. Elevated TSH with low T3-synthroid adjusted to 137 micrograms daily.     Plan:  1. Discontinue IV fluids.   2. Encouraged ambulation again.   3. Diet per Dr. Cox.   4. Will need repeat thyroid function tests in 6 weeks by her primary care provider.   5. Continue her current home meds.   6. Ok to discharge  when ok with Dr. Cox.     Chest above plan with Ignacia castro temperature.  Review note from GI and surgery.  Recommendations for  Colonoscopy in about 1 month timeframe.  Okay for discharge. Brinda Paz MD    Discharge Planning:. IRINA Carter   12/12/17   11:32 AM

## 2017-12-12 NOTE — PLAN OF CARE
Problem: Patient Care Overview (Adult)  Goal: Plan of Care Review  Outcome: Ongoing (interventions implemented as appropriate)  Minimal complaints of nausea today. zofran given twice this shift pt tolerating full liquid diet. IID and up ad stalin    Problem: Infection, Risk/Actual (Adult)  Goal: Identify Related Risk Factors and Signs and Symptoms  Outcome: Ongoing (interventions implemented as appropriate)

## 2017-12-12 NOTE — TELEPHONE ENCOUNTER
Denice, I put the case request in for a colon[facility] [place]  Dr. Moss saw her today that she may go home.  He once the colonoscopy in about a month.  Thanks

## 2017-12-12 NOTE — TELEPHONE ENCOUNTER
Please arrange an outpt. cscope in about a month at [facility].  Pt is probably going home today.

## 2017-12-13 VITALS
WEIGHT: 214.6 LBS | HEIGHT: 63 IN | BODY MASS INDEX: 38.02 KG/M2 | SYSTOLIC BLOOD PRESSURE: 150 MMHG | RESPIRATION RATE: 18 BRPM | HEART RATE: 60 BPM | OXYGEN SATURATION: 100 % | TEMPERATURE: 97.8 F | DIASTOLIC BLOOD PRESSURE: 95 MMHG

## 2017-12-13 LAB
ALBUMIN SERPL-MCNC: 3.9 G/DL (ref 3.5–5)
ALBUMIN/GLOB SERPL: 1.4 G/DL (ref 1.1–2.5)
ALP SERPL-CCNC: 78 U/L (ref 24–120)
ALT SERPL W P-5'-P-CCNC: 58 U/L (ref 0–54)
ANION GAP SERPL CALCULATED.3IONS-SCNC: 9 MMOL/L (ref 4–13)
AST SERPL-CCNC: 46 U/L (ref 7–45)
BILIRUB SERPL-MCNC: 0.5 MG/DL (ref 0.1–1)
BUN BLD-MCNC: 8 MG/DL (ref 5–21)
BUN/CREAT SERPL: 9.9 (ref 7–25)
CALCIUM SPEC-SCNC: 9.3 MG/DL (ref 8.4–10.4)
CHLORIDE SERPL-SCNC: 102 MMOL/L (ref 98–110)
CO2 SERPL-SCNC: 27 MMOL/L (ref 24–31)
CREAT BLD-MCNC: 0.81 MG/DL (ref 0.5–1.4)
DEPRECATED RDW RBC AUTO: 42.5 FL (ref 40–54)
ERYTHROCYTE [DISTWIDTH] IN BLOOD BY AUTOMATED COUNT: 13.8 % (ref 12–15)
ERYTHROCYTE [SEDIMENTATION RATE] IN BLOOD: 17 MM/HR (ref 0–20)
GFR SERPL CREATININE-BSD FRML MDRD: 75 ML/MIN/1.73
GLOBULIN UR ELPH-MCNC: 2.8 GM/DL
GLUCOSE BLD-MCNC: 98 MG/DL (ref 70–100)
HCT VFR BLD AUTO: 40.2 % (ref 37–47)
HGB BLD-MCNC: 13.5 G/DL (ref 12–16)
MCH RBC QN AUTO: 28.2 PG (ref 28–32)
MCHC RBC AUTO-ENTMCNC: 33.6 G/DL (ref 33–36)
MCV RBC AUTO: 84.1 FL (ref 82–98)
PLATELET # BLD AUTO: 242 10*3/MM3 (ref 130–400)
PMV BLD AUTO: 11 FL (ref 6–12)
POTASSIUM BLD-SCNC: 3.7 MMOL/L (ref 3.5–5.3)
PROT SERPL-MCNC: 6.7 G/DL (ref 6.3–8.7)
RBC # BLD AUTO: 4.78 10*6/MM3 (ref 4.2–5.4)
SODIUM BLD-SCNC: 138 MMOL/L (ref 135–145)
WBC NRBC COR # BLD: 9.92 10*3/MM3 (ref 4.8–10.8)

## 2017-12-13 PROCEDURE — 85027 COMPLETE CBC AUTOMATED: CPT | Performed by: SPECIALIST

## 2017-12-13 PROCEDURE — 85651 RBC SED RATE NONAUTOMATED: CPT | Performed by: SPECIALIST

## 2017-12-13 PROCEDURE — 25010000002 ENOXAPARIN PER 10 MG: Performed by: SPECIALIST

## 2017-12-13 PROCEDURE — 80053 COMPREHEN METABOLIC PANEL: CPT | Performed by: SPECIALIST

## 2017-12-13 PROCEDURE — 25010000002 CEFOXITIN PER 1 G: Performed by: SPECIALIST

## 2017-12-13 RX ORDER — METRONIDAZOLE 500 MG/1
500 TABLET ORAL 3 TIMES DAILY
Qty: 42 TABLET | Refills: 0 | Status: SHIPPED | OUTPATIENT
Start: 2017-12-13 | End: 2017-12-27

## 2017-12-13 RX ORDER — METHYLCELLULOSE 2 G/19G
2 POWDER, FOR SOLUTION ORAL DAILY
Qty: 60 G | Refills: 6 | Status: SHIPPED | OUTPATIENT
Start: 2017-12-13 | End: 2018-01-12

## 2017-12-13 RX ORDER — ONDANSETRON HCL 8 MG
8 TABLET ORAL EVERY 8 HOURS PRN
Qty: 10 TABLET | Refills: 1 | Status: ON HOLD | OUTPATIENT
Start: 2017-12-13 | End: 2020-12-29

## 2017-12-13 RX ADMIN — ENOXAPARIN SODIUM 30 MG: 30 INJECTION SUBCUTANEOUS at 08:32

## 2017-12-13 RX ADMIN — CLONIDINE HYDROCHLORIDE 0.3 MG: 0.3 TABLET ORAL at 05:43

## 2017-12-13 RX ADMIN — METRONIDAZOLE 500 MG: 500 INJECTION, SOLUTION INTRAVENOUS at 05:44

## 2017-12-13 RX ADMIN — CEFOXITIN 2 G: 2 INJECTION, POWDER, FOR SOLUTION INTRAVENOUS at 03:06

## 2017-12-13 RX ADMIN — METOPROLOL TARTRATE 100 MG: 100 TABLET ORAL at 08:31

## 2017-12-13 RX ADMIN — SUCRALFATE 1 G: 1 SUSPENSION ORAL at 00:44

## 2017-12-13 RX ADMIN — SUCRALFATE 1 G: 1 SUSPENSION ORAL at 05:44

## 2017-12-13 RX ADMIN — FAMOTIDINE 20 MG: 20 TABLET ORAL at 08:31

## 2017-12-13 RX ADMIN — LEVOTHYROXINE SODIUM 137 MCG: 137 TABLET ORAL at 08:32

## 2017-12-13 RX ADMIN — METRONIDAZOLE 500 MG: 500 INJECTION, SOLUTION INTRAVENOUS at 00:44

## 2017-12-13 RX ADMIN — CEFOXITIN 2 G: 2 INJECTION, POWDER, FOR SOLUTION INTRAVENOUS at 08:31

## 2017-12-13 RX ADMIN — DILTIAZEM HYDROCHLORIDE 60 MG: 60 TABLET, FILM COATED ORAL at 08:31

## 2017-12-13 RX ADMIN — LOSARTAN POTASSIUM 25 MG: 25 TABLET, FILM COATED ORAL at 08:32

## 2017-12-13 NOTE — PLAN OF CARE
Problem: Patient Care Overview (Adult)  Goal: Plan of Care Review  Outcome: Ongoing (interventions implemented as appropriate)    12/13/17 0234   Coping/Psychosocial Response Interventions   Plan Of Care Reviewed With patient   Patient Care Overview   Progress improving   Outcome Evaluation   Outcome Summary/Follow up Plan dry rash appeared on posterior of L upper arm; Endo/colonoscopy outpt scheduled in Jan. ;          Problem: Infection, Risk/Actual (Adult)  Goal: Identify Related Risk Factors and Signs and Symptoms  Outcome: Ongoing (interventions implemented as appropriate)    12/12/17 0245   Infection, Risk/Actual   Infection, Risk/Actual: Related Risk Factors treatment plan   Signs and Symptoms (Infection, Risk/Actual) other (see comments)  (none present at this time)       Goal: Infection Prevention/Resolution  Outcome: Ongoing (interventions implemented as appropriate)    12/13/17 0234   Infection, Risk/Actual (Adult)   Infection Prevention/Resolution making progress toward outcome         Problem: Pain, Acute (Adult)  Goal: Identify Related Risk Factors and Signs and Symptoms  Outcome: Ongoing (interventions implemented as appropriate)    12/12/17 0245   Pain, Acute   Related Risk Factors (Acute Pain) persistent pain   Signs and Symptoms (Acute Pain) verbalization of pain descriptors       Goal: Acceptable Pain Control/Comfort Level  Outcome: Ongoing (interventions implemented as appropriate)    12/13/17 0234   Pain, Acute (Adult)   Acceptable Pain Control/Comfort Level making progress toward outcome

## 2017-12-13 NOTE — PROGRESS NOTES
[facility] Gastroenterology  Inpatient Progress Note     TODAY'S DATE: 12/13/17  Gale Stone  1970    Reason for Follow Up:  Diarrhea    Subjective:     No  bm this am,  No n/v. Tolerated yogurt this am.  No fever.  She is scheduled for colonoscopy 1/9/18 at [facility].       No Known Allergies    Current Facility-Administered Medications:   •  albuterol (PROVENTIL) nebulizer solution 0.5% 2.5 mg/0.5mL, 2.5 mg, Nebulization, Q6H PRN, Rm Cox MD  •  amitriptyline (ELAVIL) tablet 50 mg, 50 mg, Oral, Nightly, Rm Cox MD, 50 mg at 12/12/17 2033  •  cefoxitin (MEFOXIN) 2 g/100 mL 0.9% NS (MBP), 2 g, Intravenous, Q6H, Rm Cox MD, Last Rate: 0 mL/hr at 12/13/17 0406, 2 g at 12/13/17 0831  •  CloNIDine (CATAPRES) tablet 0.3 mg, 0.3 mg, Oral, Q8H, Rm Cox MD, 0.3 mg at 12/13/17 0543  •  cyclobenzaprine (FLEXERIL) tablet 10 mg, 10 mg, Oral, TID PRN, Rm Cox MD  •  diltiaZEM (CARDIZEM) tablet 60 mg, 60 mg, Oral, Q12H, Rm Cox MD, 60 mg at 12/13/17 0831  •  enoxaparin (LOVENOX) syringe 30 mg, 30 mg, Subcutaneous, Q12H, Rm Cox MD, 30 mg at 12/13/17 0832  •  famotidine (PEPCID) injection 20 mg, 20 mg, Intravenous, Q12H, 20 mg at 12/12/17 2034 **OR** famotidine (PEPCID) tablet 20 mg, 20 mg, Oral, Q12H, Rm Cox MD, 20 mg at 12/13/17 0831  •  hydrALAZINE (APRESOLINE) injection 10 mg, 10 mg, Intravenous, Q6H PRN, IRINA Morales  •  ketorolac (TORADOL) injection 15 mg, 15 mg, Intravenous, Q6H PRN, Marielos Wiggins APRN, 15 mg at 12/12/17 1421  •  latanoprost (XALATAN) 0.005 % ophthalmic solution 1 drop, 1 drop, Both Eyes, Nightly, Rm Cox MD, 1 drop at 12/12/17 2034  •  levothyroxine (SYNTHROID, LEVOTHROID) tablet 137 mcg, 137 mcg, Oral, Daily, IRINA Carter, 137 mcg at 12/13/17 0832  •  losartan (COZAAR) tablet 25 mg, 25 mg, Oral, Q24H, Rm Cox MD, 25 mg at 12/13/17 0832  •  metoprolol tartrate  (LOPRESSOR) tablet 100 mg, 100 mg, Oral, Q12H, Rm Cox MD, 100 mg at 12/13/17 0831  •  metroNIDAZOLE (FLAGYL) IVPB 500 mg, 500 mg, Intravenous, Q6H, Rm Cox MD, Stopped at 12/13/17 0644  •  Morphine sulfate (PF) injection 1 mg, 1 mg, Intravenous, Q4H PRN **AND** naloxone (NARCAN) injection 0.4 mg, 0.4 mg, Intravenous, Q5 Min PRN, Rm Cox MD  •  Morphine sulfate (PF) injection 1 mg, 1 mg, Intravenous, Q4H PRN, 1 mg at 12/11/17 0918 **AND** naloxone (NARCAN) injection 0.4 mg, 0.4 mg, Intravenous, Q5 Min PRN, Rm Cox MD  •  nitroglycerin (NITROSTAT) SL tablet 0.4 mg, 0.4 mg, Sublingual, Q5 Min PRN, Rm Cox MD  •  ondansetron (ZOFRAN) 8 mg/50 mL NS IVPB, 8 mg, Intravenous, Q6H PRN, Rm Cox MD, 8 mg at 12/12/17 0841  •  ondansetron ODT (ZOFRAN-ODT) disintegrating tablet 8 mg, 8 mg, Oral, Q6H PRN, Rm Cox MD, 8 mg at 12/12/17 1349  •  sodium chloride 0.9 % flush 1-10 mL, 1-10 mL, Intravenous, PRN, Rm Cox MD  •  sucralfate (CARAFATE) 1 GM/10ML suspension 1 g, 1 g, Oral, Q6H, Rm Cox MD, 1 g at 12/13/17 0544    Review of Systems   Constitutional: Negative for chills and fever.   Respiratory: Negative for cough, shortness of breath and wheezing.    Cardiovascular: Negative for chest pain and palpitations.   Gastrointestinal: Negative for abdominal distention, abdominal pain, anal bleeding, blood in stool, constipation, diarrhea, nausea, rectal pain and vomiting.       Objective     Vital Signs  Temp:  [97.8 °F (36.6 °C)-98.9 °F (37.2 °C)] 97.8 °F (36.6 °C)  Heart Rate:  [50-60] 60  Resp:  [16-20] 18  BP: (123-157)/(80-95) 150/95  Body mass index is 38.01 kg/(m^2).    Intake/Output Summary (Last 24 hours) at 12/13/17 0993  Last data filed at 12/13/17 0519   Gross per 24 hour   Intake              200 ml   Output             1000 ml   Net             -800 ml             PHYSICAL EXAM  Physical Exam   Constitutional: She appears well-developed and  well-nourished. No distress.   Cardiovascular: Normal rate, regular rhythm and normal heart sounds.    Pulmonary/Chest: Effort normal and breath sounds normal.   Abdominal: Soft. Bowel sounds are normal. She exhibits no distension and no mass. There is tenderness (mild tenderness rlq. ). There is no rebound and no guarding.   Musculoskeletal: She exhibits no edema.   Neurological: She is alert.   Skin: Skin is warm and dry.   Psychiatric: She has a normal mood and affect. Her behavior is normal.   Vitals reviewed.          Results Review:   I have reviewed all of the patient's current test results      Results from last 7 days  Lab Units 12/13/17  0616 12/12/17 0428 12/11/17  0323   WBC 10*3/mm3 9.92 10.63 12.62*   HEMOGLOBIN g/dL 13.5 12.7 11.7*   HEMATOCRIT % 40.2 37.8 34.2*   PLATELETS 10*3/mm3 242 238 222         Results from last 7 days  Lab Units 12/13/17  0616 12/12/17  0428 12/11/17  0323   SODIUM mmol/L 138 138 138   POTASSIUM mmol/L 3.7 4.2 3.8   CHLORIDE mmol/L 102 99 104   CO2 mmol/L 27.0 31.0 25.0   BUN mg/dL 8 6 8   CREATININE mg/dL 0.81 0.79 0.71   CALCIUM mg/dL 9.3 9.0 8.6   BILIRUBIN mg/dL 0.5 0.4 0.3   ALK PHOS U/L 78 68 65   ALT (SGPT) U/L 58* 56* 50   AST (SGOT) U/L 46* 42 33   GLUCOSE mg/dL 98 93 125*         Results from last 7 days  Lab Units 12/10/17  0609   INR  1.07         Lab Results  Lab Value Date/Time   LIPASE 605 (H) 12/10/2017 0609       Radiology Review:  Imaging Results (last 24 hours)     ** No results found for the last 24 hours. **            Assessment/Plan     Patient Active Problem List   Diagnosis Code   • Colitis K52.9       1.  Colitis  2.  Norovirus      History is consistent with norovirus , and may have developed some mild ischemic colitis.  She is currently tolerating a solid diet.  Okay for discharge home today.  She is scheduled for colonoscopy investigation 01/09/2018 at [facility].  Recommend she follow up with her primary care provider.        EMR  Dragon/transcription disclaimer:  Much of this encounter note is electronic transcription/translation of spoken language to printed text.  The electronic translation of spoken language may be erroneous, or at times, nonsensical words or phrases may be inadvertently transcribed.  Although I have reviewed the note for such errors, some may still exist.      Alejandra Vega APRN  9:50 AM    Alejandra Vega, APRTIBURCIO  12/13/17  9:50 AM

## 2017-12-13 NOTE — DISCHARGE SUMMARY
Date of Discharge:  12/13/2017    Discharge Diagnosis: Central abdominal pain     Presenting Problem/History of Present Illness      Gale Stone  is a 47 y.o. female presents withCentral abdominal pain.  She states that she was admitted at [facility] 5 days ago and was in the hospital for the past 5 days with breathing problems and also some nausea.  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  She was released from the hospital on Saturday and she had some central abdominal pain and also some diarrhea and with this she was taken to formerly [facility] for evaluation she told them she also had a umbilical hernia and the ambulance personnel took her to [facility] as there is no surgeon at [facility] if surgery was required.  On examination once again her history is somewhat sketchy she vacillates between breathing problems, hypertension, nausea, and intermittent diarrhea.  No history of any antibiotic use.  On examination she has occasional bowel sounds, tenderness in the central and right and left flank.  No peritoneal signs, I do not appreciate any hernia in the periumbilical region which she states that she has a hernia and on her CT scan there shows to be colitis from the hepatic flexure to the mid descending colon.  There is no hernia noted in the abdominal wall either.  With the above problem is presumed that she has colitis her last colonoscopy was over 10 years prior we will begin treatment for colitis we will check a C. difficile, we will have a PICC line placed, she has a large large amount of bruising from her Lovenox we will change that to the thigh instead of the abdomen and ultimately had a colonoscopy completed or set up prior to her  discharge.    She was admitted and cared for fluid hydration was accomplished IV antibiotics were completed and a MEHNAZ evaluation was accomplished.  The MEHNAZ evaluation was positive for norovirus and also GI consultation was completed.  She has not had a colonoscopy in some time they plan to let the diarrhea and we will look toward colonoscopy to be completed in January or February.  Dr Moss saw her and plans to complete this.  With this being the case we will look toward discharging her to her home  And then scheduling the colonoscopy per Dr. Moss .  Her white count is 9 electrolytes within normal limits she will be discharged to follow-up with me as needed she will continue her follow-up with her regular physician we will discharge her on by mouth Flagyl as well as suggestions to take yogurt as well as Zofran for nausea as needed.           Consults:   Consults     Date and Time Order Name Status Description    12/12/2017 1329 Inpatient Consult to Gastroenterology Completed     12/10/2017 0803 Inpatient Consult to Hospitalist            Pertinent Test Results:   Lab Results (last 24 hours)     Procedure Component Value Units Date/Time    Urine Culture - Urine, Urine, Clean Catch [[id number]]  (Abnormal) Collected:  12/10/17 1321    Specimen:  Urine from Urine, Clean Catch Updated:  12/12/17 0817     Urine Culture --      <10,000 CFU/mL Mixed Gram Positive Lorna (A)    Narrative:       Probable contaminant    CBC (No Diff) [[id number]]  (Normal) Collected:  12/13/17 0616    Specimen:  Blood Updated:  12/13/17 0647     WBC 9.92 10*3/mm3      RBC 4.78 10*6/mm3      Hemoglobin 13.5 g/dL      Hematocrit 40.2 %      MCV 84.1 fL      MCH 28.2 pg      MCHC 33.6 g/dL      RDW 13.8 %      RDW-SD 42.5 fl      MPV 11.0 fL      Platelets 242 10*3/mm3     Comprehensive Metabolic Panel [[id number]]  (Abnormal) Collected:  12/13/17 0616    Specimen:  Blood Updated:  12/13/17 0703     Glucose 98 mg/dL      BUN 8 mg/dL       Creatinine 0.81 mg/dL      Sodium 138 mmol/L      Potassium 3.7 mmol/L      Chloride 102 mmol/L      CO2 27.0 mmol/L      Calcium 9.3 mg/dL      Total Protein 6.7 g/dL      Albumin 3.90 g/dL      ALT (SGPT) 58 (H) U/L      AST (SGOT) 46 (H) U/L      Alkaline Phosphatase 78 U/L      Total Bilirubin 0.5 mg/dL      eGFR Non African Amer 75 mL/min/1.73      Globulin 2.8 gm/dL      A/G Ratio 1.4 g/dL      BUN/Creatinine Ratio 9.9     Anion Gap 9.0 mmol/L     Sedimentation Rate [[id number]]  (Normal) Collected:  12/13/17 0616    Specimen:  Blood Updated:  12/13/17 0726     Sed Rate 17 mm/hr     Blood Culture - Blood, [[id number]]  (Normal) Collected:  12/10/17 0603    Specimen:  Blood from Arm, Right Updated:  12/13/17 0816     Blood Culture No growth at 3 days    Blood Culture - Blood, [[id number]]  (Normal) Collected:  12/10/17 0608    Specimen:  Blood from Arm, Right Updated:  12/13/17 0816     Blood Culture No growth at 3 days            Condition on Discharge:Good condition    ischarge Disposition discharge to home       Discharge Medications   Gale Stone   Home Medication Instructions FAINA:[id number]    Printed on:12/13/17 0816   Medication Information                      albuterol (PROVENTIL HFA;VENTOLIN HFA) 108 (90 Base) MCG/ACT inhaler  Inhale 2 puffs Every 4 (Four) Hours As Needed for Wheezing.             amitriptyline (ELAVIL) 50 MG tablet  Take 50 mg by mouth Every Night.             aspirin 325 MG tablet  Take 325 mg by mouth Daily.             CloNIDine (CATAPRES) 0.3 MG tablet  Take 0.3 mg by mouth 3 (Three) Times a Day.             cyclobenzaprine (FLEXERIL) 10 MG tablet  Take 10 mg by mouth 3 (Three) Times a Day As Needed for Muscle Spasms.             diltiaZEM (CARDIZEM) 60 MG tablet  Take 60 mg by mouth 2 (Two) Times a Day.             latanoprost (XALATAN) 0.005 % ophthalmic solution  1 drop Every Night.             levothyroxine (SYNTHROID, LEVOTHROID) 125 MCG tablet  Take 125 mcg by mouth  Daily.             Losartan Potassium (COZAAR PO)  Take  by mouth.             metoprolol tartrate (LOPRESSOR) 100 MG tablet  Take 100 mg by mouth 2 (Two) Times a Day.             nitroglycerin (NITROSTAT) 0.4 MG SL tablet  Place 0.4 mg under the tongue Every 5 (Five) Minutes As Needed for Chest Pain. Take no more than 3 doses in 15 minutes.             polyethylene glycol-electrolytes (NULYTELY WITH FLAVOR PACKS) 420 g solution  Take 4,000 mL by mouth See Admin Instructions.                 Discharge Diet: Regular diet     Activity at Discharge:     Follow-up Appointments follow-up with GI medicine and her regular physician   No future appointments.      Test Results Pending at Discharge   Order Current Status    Blood Culture - Blood, Preliminary result    Blood Culture - Blood, Preliminary result           Rm Cox MD  12/13/17  8:16 AM    Time: Time spent at discharge 30 minutes     EMR Dragon/Transcription disclaimer: Much of this encounter note is an electronic transcription/translation of spoken language to printed text. The electronic translation of spoken language may permit erroneous, or at times, nonsensical words or phrases to be inadvertently transcribed; although I have reviewed the note for such errors, some may still exist.

## 2017-12-13 NOTE — PROGRESS NOTES
"    [facility]  INPATIENT PROGRESS NOTE    Length of Stay: 3  Date of Admission: 12/10/2017  Primary Care Physician: No Known Provider    Subjective   Chief Complaint: Follow up norovirus  HPI   Pt is vomited twice yesterday. Has been seen by GI and hopes to be discharged home today if she can \"keep something down.\" No bowel movements. Less overall abdominal pain. No chest pain. Has been up ad stalin in the room.     Review of Systems   All pertinent negatives and positives are as above. All other systems have been reviewed and are negative unless otherwise stated.     Objective    Temp:  [97.8 °F (36.6 °C)-98.9 °F (37.2 °C)] 98.1 °F (36.7 °C)  Heart Rate:  [50-79] 50  Resp:  [16-20] 16  BP: (123-157)/(63-95) 123/80  Physical Exam   Constitutional: She is oriented to person, place, and time. She appears well-developed and well-nourished.   HENT:   Head: Normocephalic and atraumatic.   Eyes: Conjunctivae and EOM are normal. Pupils are equal, round, and reactive to light.   Neck: Neck supple. No JVD present. No thyromegaly present.   Cardiovascular: Normal rate, regular rhythm, normal heart sounds and intact distal pulses.  Exam reveals no gallop and no friction rub.    No murmur heard.  Pulmonary/Chest: Effort normal and breath sounds normal. No respiratory distress. She has no wheezes. She has no rales. She exhibits no tenderness.   Abdominal: Soft. Bowel sounds are normal. She exhibits no distension. There is tenderness (mild tenderness right lower quadrant. ). There is no rebound and no guarding.   Musculoskeletal: Normal range of motion. She exhibits no edema, tenderness or deformity.   Lymphadenopathy:     She has no cervical adenopathy.   Neurological: She is alert and oriented to person, place, and time. She displays normal reflexes. No cranial nerve deficit. She exhibits normal muscle tone.   Skin: Skin is warm and dry. No rash noted.   Psychiatric: She has a " normal mood and affect. Her behavior is normal. Judgment and thought content normal.     Results Review:  I have reviewed the labs, radiology results, and diagnostic studies.    Laboratory Data:     Results from last 7 days  Lab Units 12/12/17  0428 12/11/17  0323 12/10/17  0048   WBC 10*3/mm3 10.63 12.62* 25.07*   HEMOGLOBIN g/dL 12.7 11.7* 15.0   HEMATOCRIT % 37.8 34.2* 45.5   PLATELETS 10*3/mm3 238 222 268       Results from last 7 days  Lab Units 12/12/17  0428 12/11/17  0323 12/10/17  0048   SODIUM mmol/L 138 138 144   POTASSIUM mmol/L 4.2 3.8 3.7   CHLORIDE mmol/L 99 104 104   CO2 mmol/L 31.0 25.0 28.0   BUN mg/dL 6 8 15   CREATININE mg/dL 0.79 0.71 0.81   CALCIUM mg/dL 9.0 8.6 9.5   BILIRUBIN mg/dL 0.4 0.3 0.5   ALK PHOS U/L 68 65 103   ALT (SGPT) U/L 56* 50 63*   AST (SGOT) U/L 42 33 47*   GLUCOSE mg/dL 93 125* 93     Culture Data:   Blood Culture   Date Value Ref Range Status   12/10/2017 No growth at 2 days  Preliminary   12/10/2017 No growth at 2 days  Preliminary     Urine Culture   Date Value Ref Range Status   12/10/2017 <10,000 CFU/mL Mixed Gram Positive Lorna (A)  Final     Radiology Data:   Imaging Results (last 24 hours)     ** No results found for the last 24 hours. **        I have reviewed the patient current medications.     Assessment/Plan   Assessment:  1. Colitis-viral secondary to norovirus  2. Leukocytosis-improved  3. Lactic Acidosis    4. Chest pain- atypical, likely secondary to #1, negative troponin  5. Transaminitis, likely secondary to #1  6. Hypertension-stable  7. History of hypothyroidism  8. History of asthma  9. Obesity- BMI 38  10. Elevated TSH with low T3-synthroid adjusted to 137 micrograms daily    Plan:  1. Continue synthroid 137 mcg at home. She already has some at home. Will need follow up thyroid function tests in 6 weeks by her PCP.   2. Ok to discharge. Will sign off.   3. Continue her home blood pressure regimen.     Discharge Planning: I expect the patient to be  discharged to home per Dr. Cox.   Discussed above.  Agree with above. Plans to d/c per surgery.  MD Marielos Gonzalez, APRN   12/13/17   4:59 AM

## 2017-12-15 LAB
BACTERIA SPEC AEROBE CULT: NORMAL
BACTERIA SPEC AEROBE CULT: NORMAL

## 2018-01-09 ENCOUNTER — OUTSIDE FACILITY SERVICE (OUTPATIENT)
Dept: GASTROENTEROLOGY | Facility: CLINIC | Age: 48
End: 2018-01-09

## 2018-01-09 PROCEDURE — 45385 COLONOSCOPY W/LESION REMOVAL: CPT | Performed by: INTERNAL MEDICINE

## 2018-01-09 PROCEDURE — 88305 TISSUE EXAM BY PATHOLOGIST: CPT | Performed by: INTERNAL MEDICINE

## 2018-01-10 ENCOUNTER — LAB REQUISITION (OUTPATIENT)
Dept: LAB | Facility: HOSPITAL | Age: 48
End: 2018-01-10

## 2018-01-10 DIAGNOSIS — Z00.00 ENCOUNTER FOR GENERAL ADULT MEDICAL EXAMINATION WITHOUT ABNORMAL FINDINGS: ICD-10-CM

## 2018-01-11 LAB
CYTO UR: NORMAL
LAB AP CASE REPORT: NORMAL
LAB AP CLINICAL INFORMATION: NORMAL
Lab: NORMAL
PATH REPORT.FINAL DX SPEC: NORMAL
PATH REPORT.GROSS SPEC: NORMAL

## 2019-05-29 ENCOUNTER — APPOINTMENT (OUTPATIENT)
Dept: GENERAL RADIOLOGY | Facility: HOSPITAL | Age: 49
End: 2019-05-29

## 2019-05-29 ENCOUNTER — HOSPITAL ENCOUNTER (EMERGENCY)
Facility: HOSPITAL | Age: 49
Discharge: HOME | End: 2019-05-29
Admitting: EMERGENCY MEDICINE

## 2019-05-29 VITALS
BODY MASS INDEX: 35.04 KG/M2 | DIASTOLIC BLOOD PRESSURE: 102 MMHG | OXYGEN SATURATION: 96 % | HEART RATE: 58 BPM | WEIGHT: 190.4 LBS | HEIGHT: 62 IN | TEMPERATURE: 98.5 F | SYSTOLIC BLOOD PRESSURE: 163 MMHG | RESPIRATION RATE: 16 BRPM

## 2019-05-29 DIAGNOSIS — J45.909 ASTHMA, UNSPECIFIED ASTHMA SEVERITY, UNSPECIFIED WHETHER COMPLICATED, UNSPECIFIED WHETHER PERSISTENT: Primary | ICD-10-CM

## 2019-05-29 DIAGNOSIS — I10 HYPERTENSION, UNSPECIFIED TYPE: ICD-10-CM

## 2019-05-29 LAB
ALBUMIN SERPL-MCNC: 4.8 G/DL (ref 3.5–5)
ALBUMIN/GLOB SERPL: 1.5 G/DL (ref 1.1–2.5)
ALP SERPL-CCNC: 77 U/L (ref 24–120)
ALT SERPL W P-5'-P-CCNC: 22 U/L (ref 0–54)
ANION GAP SERPL CALCULATED.3IONS-SCNC: 8 MMOL/L (ref 4–13)
AST SERPL-CCNC: 39 U/L (ref 7–45)
BASOPHILS # BLD AUTO: 0.06 10*3/MM3 (ref 0–0.2)
BASOPHILS NFR BLD AUTO: 0.6 % (ref 0–2)
BILIRUB SERPL-MCNC: 0.5 MG/DL (ref 0.1–1)
BUN BLD-MCNC: 14 MG/DL (ref 5–21)
BUN/CREAT SERPL: 21.9 (ref 7–25)
CALCIUM SPEC-SCNC: 10.4 MG/DL (ref 8.4–10.4)
CHLORIDE SERPL-SCNC: 104 MMOL/L (ref 98–110)
CO2 SERPL-SCNC: 27 MMOL/L (ref 24–31)
CREAT BLD-MCNC: 0.64 MG/DL (ref 0.5–1.4)
DEPRECATED RDW RBC AUTO: 41.7 FL (ref 40–54)
EOSINOPHIL # BLD AUTO: 1.44 10*3/MM3 (ref 0–0.7)
EOSINOPHIL NFR BLD AUTO: 14.1 % (ref 0–4)
ERYTHROCYTE [DISTWIDTH] IN BLOOD BY AUTOMATED COUNT: 13.5 % (ref 12–15)
GFR SERPL CREATININE-BSD FRML MDRD: 98 ML/MIN/1.73
GLOBULIN UR ELPH-MCNC: 3.1 GM/DL
GLUCOSE BLD-MCNC: 86 MG/DL (ref 70–100)
HCT VFR BLD AUTO: 44 % (ref 37–47)
HGB BLD-MCNC: 15.1 G/DL (ref 12–16)
HOLD SPECIMEN: NORMAL
HOLD SPECIMEN: NORMAL
IMM GRANULOCYTES # BLD AUTO: 0.06 10*3/MM3 (ref 0–0.05)
IMM GRANULOCYTES NFR BLD AUTO: 0.6 % (ref 0–5)
LYMPHOCYTES # BLD AUTO: 3.19 10*3/MM3 (ref 0.72–4.86)
LYMPHOCYTES NFR BLD AUTO: 31.2 % (ref 15–45)
MCH RBC QN AUTO: 28.9 PG (ref 28–32)
MCHC RBC AUTO-ENTMCNC: 34.3 G/DL (ref 33–36)
MCV RBC AUTO: 84.1 FL (ref 82–98)
MONOCYTES # BLD AUTO: 0.49 10*3/MM3 (ref 0.19–1.3)
MONOCYTES NFR BLD AUTO: 4.8 % (ref 4–12)
NEUTROPHILS # BLD AUTO: 4.99 10*3/MM3 (ref 1.87–8.4)
NEUTROPHILS NFR BLD AUTO: 48.7 % (ref 39–78)
NRBC BLD AUTO-RTO: 0 /100 WBC (ref 0–0.2)
PLATELET # BLD AUTO: 271 10*3/MM3 (ref 130–400)
PMV BLD AUTO: 11 FL (ref 6–12)
POTASSIUM BLD-SCNC: 4.6 MMOL/L (ref 3.5–5.3)
PROT SERPL-MCNC: 7.9 G/DL (ref 6.3–8.7)
RBC # BLD AUTO: 5.23 10*6/MM3 (ref 4.2–5.4)
SODIUM BLD-SCNC: 139 MMOL/L (ref 135–145)
WBC NRBC COR # BLD: 10.23 10*3/MM3 (ref 4.8–10.8)
WHOLE BLOOD HOLD SPECIMEN: NORMAL
WHOLE BLOOD HOLD SPECIMEN: NORMAL

## 2019-05-29 PROCEDURE — 94799 UNLISTED PULMONARY SVC/PX: CPT

## 2019-05-29 PROCEDURE — 80053 COMPREHEN METABOLIC PANEL: CPT | Performed by: NURSE PRACTITIONER

## 2019-05-29 PROCEDURE — 94640 AIRWAY INHALATION TREATMENT: CPT

## 2019-05-29 PROCEDURE — 99284 EMERGENCY DEPT VISIT MOD MDM: CPT

## 2019-05-29 PROCEDURE — 85025 COMPLETE CBC W/AUTO DIFF WBC: CPT | Performed by: NURSE PRACTITIONER

## 2019-05-29 PROCEDURE — 71045 X-RAY EXAM CHEST 1 VIEW: CPT

## 2019-05-29 RX ORDER — CLONIDINE HYDROCHLORIDE 0.1 MG/1
0.1 TABLET ORAL ONCE
Status: COMPLETED | OUTPATIENT
Start: 2019-05-29 | End: 2019-05-29

## 2019-05-29 RX ORDER — IPRATROPIUM BROMIDE AND ALBUTEROL SULFATE 2.5; .5 MG/3ML; MG/3ML
3 SOLUTION RESPIRATORY (INHALATION) ONCE
Status: COMPLETED | OUTPATIENT
Start: 2019-05-29 | End: 2019-05-29

## 2019-05-29 RX ADMIN — CLONIDINE HYDROCHLORIDE 0.1 MG: 0.1 TABLET ORAL at 10:18

## 2019-05-29 RX ADMIN — IPRATROPIUM BROMIDE AND ALBUTEROL SULFATE 3 ML: 2.5; .5 SOLUTION RESPIRATORY (INHALATION) at 08:54

## 2019-05-29 RX ADMIN — IPRATROPIUM BROMIDE AND ALBUTEROL SULFATE 3 ML: 2.5; .5 SOLUTION RESPIRATORY (INHALATION) at 09:28

## 2020-02-04 ENCOUNTER — HOSPITAL ENCOUNTER (EMERGENCY)
Facility: HOSPITAL | Age: 50
Discharge: HOME | End: 2020-02-04
Admitting: INTERNAL MEDICINE

## 2020-02-04 ENCOUNTER — APPOINTMENT (OUTPATIENT)
Dept: GENERAL RADIOLOGY | Facility: HOSPITAL | Age: 50
End: 2020-02-04

## 2020-02-04 VITALS
HEIGHT: 63 IN | TEMPERATURE: 99 F | SYSTOLIC BLOOD PRESSURE: 128 MMHG | HEART RATE: 91 BPM | RESPIRATION RATE: 16 BRPM | BODY MASS INDEX: 34.38 KG/M2 | WEIGHT: 194 LBS | DIASTOLIC BLOOD PRESSURE: 93 MMHG | OXYGEN SATURATION: 98 %

## 2020-02-04 DIAGNOSIS — M25.462 EFFUSION OF BURSA OF LEFT KNEE: Primary | ICD-10-CM

## 2020-02-04 PROCEDURE — 73562 X-RAY EXAM OF KNEE 3: CPT

## 2020-02-04 PROCEDURE — 63710000001 DEXAMETHASONE PER 0.25 MG: Performed by: PHYSICIAN ASSISTANT

## 2020-02-04 PROCEDURE — 99283 EMERGENCY DEPT VISIT LOW MDM: CPT

## 2020-02-04 RX ORDER — DEXAMETHASONE 4 MG/1
4 TABLET ORAL ONCE
Status: COMPLETED | OUTPATIENT
Start: 2020-02-04 | End: 2020-02-04

## 2020-02-04 RX ORDER — KETOROLAC TROMETHAMINE 10 MG/1
10 TABLET, FILM COATED ORAL EVERY 6 HOURS PRN
Qty: 12 TABLET | Refills: 0 | Status: ON HOLD | OUTPATIENT
Start: 2020-02-04 | End: 2020-12-29

## 2020-02-04 RX ORDER — KETOROLAC TROMETHAMINE 10 MG/1
10 TABLET, FILM COATED ORAL EVERY 6 HOURS PRN
Status: DISCONTINUED | OUTPATIENT
Start: 2020-02-04 | End: 2020-02-04 | Stop reason: HOSPADM

## 2020-02-04 RX ORDER — HYDROCODONE BITARTRATE AND ACETAMINOPHEN 7.5; 325 MG/1; MG/1
1 TABLET ORAL ONCE
Status: COMPLETED | OUTPATIENT
Start: 2020-02-04 | End: 2020-02-04

## 2020-02-04 RX ADMIN — DEXAMETHASONE 4 MG: 4 TABLET ORAL at 20:56

## 2020-02-04 RX ADMIN — HYDROCODONE BITARTRATE AND ACETAMINOPHEN 1 TABLET: 7.5; 325 TABLET ORAL at 20:56

## 2020-02-04 RX ADMIN — KETOROLAC TROMETHAMINE 10 MG: 10 TABLET, FILM COATED ORAL at 20:57

## 2020-02-05 NOTE — DISCHARGE INSTRUCTIONS
Knee Effusion    Knee effusion means that you have extra fluid in your knee. This can cause pain. Your knee may be more difficult to bend and move.  What are the causes?  Common causes are:  · Arthritis.  · Infection.  · Injury.  · Autoimmune disease. This means that your body's defense system (immune system) mistakenly attacks healthy body tissues.  Follow these instructions at home:  Medicines  · Take medicines only as told by your doctor.  · Do not drive or use heavy machinery while taking prescription pain medicine.  · If you are taking prescription pain medicine, take actions to help prevent or treat trouble pooping (constipation). Your doctor may recommend that you:  ? Drink enough fluid to keep your pee (urine) pale yellow.  ? Eat foods that are high in fiber. These include fresh fruits and vegetables, whole grains, and beans.  ? Avoid eating fatty or sweet foods.  ? Take a medicine for constipation.  If you have a brace:  · Wear the brace as told by your doctor. Remove it only as told by your doctor.  · Loosen the brace if your toes tingle, become numb, or turn cold and blue.  · Keep the brace clean.  · If the brace is not waterproof:  ? Do not let it get wet.  ? Cover it with a watertight covering when you take a bath or a shower.  Managing pain, stiffness, and swelling    · If told, apply ice to the swollen area:  ? If you have a removable brace, remove it as told by your doctor.  ? Put ice in a plastic bag.  ? Place a towel between your skin and the bag.  ? Leave the ice on for 20 minutes, 2-3 times per day.  · Keep your knee raised (elevated) when you are sitting or lying down.  General instructions  · Do not use any products that contain nicotine or tobacco, such as cigarettes and e-cigarettes. These can delay healing. If you need help quitting, ask your doctor.  · Use crutches as told by your doctor.  · Do exercises as told by your doctor.  · Rest as told by your doctor.  · Keep all follow-up visits as  told by your doctor. This is important.  Contact a doctor if you:  · Continue to have pain in your knee.  Get help right away if you:  · Have swelling or redness of your knee that gets worse or does not get better.  · Have serious pain in your knee.  · Have a fever.  Summary  · Knee effusion is when you have extra fluid in your knee. This causes pain and swelling and makes it hard to bend and move your knee.  · Take medicines only as told by your doctor.  · If you have a brace, wear the brace as told by your doctor.  This information is not intended to replace advice given to you by your health care provider. Make sure you discuss any questions you have with your health care provider.  Document Released: 01/20/2012 Document Revised: 12/30/2018 Document Reviewed: 12/30/2018  [employer] Interactive Patient Education © 2019 [employer]

## 2020-02-07 NOTE — ED PROVIDER NOTES
"Subjective   History of Present Illness    Patient is a 49-year-old female presenting to ED with left knee pain.  Patient reported today she was walking outside when she slipped on a wet flat surface.  Patient denies any symptoms prior to the fall including chest pain, dizziness, lightheadedness, or syncope.  Patient reiterated that the fall was mechanical in nature.  Patient reported as her foot started to slip she tried to plant it down harder at which time she \"twisted my knee somehow\".  Patient reported that she slowly fell to the ground and denies any injury when falling, just injury to her knee during the slipping process.  Patient denies any head injury, loss of consciousness, neck pain, or back pain.  Patient reported that she dislocated her left knee 27 years ago and today it looks and feels the same.  Patient did note that she has a lateral area of \"cartilage that sticks out all the time\" which has been unchanged since the fall.  Patient reports most of her pain is on the medial aspect and denies any posterior knee pain.  Patient denies any radiation proximal or distal to the knee.  Patient denies any sensation changes but did report upon walking her pain is worsened.  Patient denies any medication use prior to arrival.    Records reviewed show patient has never been seen for any previous musculoskeletal complaints and has no previous lower extremity imaging.    Review of Systems   Constitutional: Negative.    HENT: Negative.    Eyes: Negative.  Negative for visual disturbance.   Respiratory: Negative.    Cardiovascular: Negative.  Negative for chest pain.   Gastrointestinal: Negative.    Genitourinary: Negative.    Musculoskeletal: Positive for arthralgias (Left knee) and joint swelling (Left knee). Negative for back pain, gait problem and neck pain.   Skin: Negative.  Negative for rash and wound.   Neurological: Negative.  Negative for dizziness, syncope and light-headedness.   Psychiatric/Behavioral: " Negative.    All other systems reviewed and are negative.      Past Medical History:   Diagnosis Date   • Disease of thyroid gland    • Hypertension        Allergies   Allergen Reactions   • Other Palpitations     Patient reports allergy to IV steriods       Past Surgical History:   Procedure Laterality Date   • TUBAL ABDOMINAL LIGATION         No family history on file.    Social History     Socioeconomic History   • Marital status:      Spouse name: Not on file   • Number of children: Not on file   • Years of education: Not on file   • Highest education level: Not on file   Tobacco Use   • Smoking status: Never Smoker   Substance and Sexual Activity   • Alcohol use: No   • Drug use: No   • Sexual activity: Defer           Objective   Physical Exam   Constitutional: She is oriented to person, place, and time. She appears well-developed and well-nourished. She is cooperative. No distress.   HENT:   Head: Normocephalic and atraumatic.   Right Ear: External ear normal.   Left Ear: External ear normal.   Mouth/Throat: Uvula is midline, oropharynx is clear and moist and mucous membranes are normal.   Eyes: Pupils are equal, round, and reactive to light. Conjunctivae, EOM and lids are normal. Right conjunctiva is not injected. Left conjunctiva is not injected.   Neck: Normal range of motion. Neck supple.   Cardiovascular: Normal rate, regular rhythm, normal heart sounds, intact distal pulses and normal pulses.   No murmur heard.  Pulses:       Radial pulses are 2+ on the right side, and 2+ on the left side.        Popliteal pulses are 2+ on the right side, and 2+ on the left side.        Dorsalis pedis pulses are 2+ on the right side, and 2+ on the left side.        Posterior tibial pulses are 2+ on the right side, and 2+ on the left side.   Pulmonary/Chest: Effort normal and breath sounds normal. No respiratory distress. She has no decreased breath sounds. She has no wheezes. She has no rhonchi. She has no rales.  She exhibits no bony tenderness.   Abdominal: Soft. Normal appearance and bowel sounds are normal. There is no tenderness. There is no guarding and no CVA tenderness.   Musculoskeletal:        Left knee: She exhibits swelling (Over medial aspect) and bony tenderness. She exhibits normal range of motion, no ecchymosis, no deformity and normal patellar mobility. Tenderness found. Medial joint line tenderness noted.        Cervical back: Normal.        Thoracic back: Normal.        Lumbar back: Normal.   5/5 strength to bilateral lower extremities with normal sensation exam.  Good cap refill to bilateral lower extremities.  Range of motion of left knee with no other bony/joint tenderness or edema to bilateral lower extremities.   Lymphadenopathy:     She has no cervical adenopathy.   Neurological: She is alert and oriented to person, place, and time. She has normal strength. Gait normal.   Skin: Skin is warm, dry and intact. Capillary refill takes less than 2 seconds. No rash noted. She is not diaphoretic.   Psychiatric: She has a normal mood and affect. Her speech is normal and behavior is normal. Thought content normal.   Nursing note and vitals reviewed.      Procedures           ED Course  ED Course as of Feb 07 1210   Tue Feb 04, 2020   2030 Upon reevaluation patient resting in room reporting she is still having pain.  Discussed ability to order additional medication at this time.  Reviewed with patient x-ray findings, finding of effusion, and need for knee brace.  Discussed importance of rest, ice, elevation, and need for PCP follow-up for further long-term treatment as well as potential orthopedic referral.  Discussed importance of ice versus heat as well as gentle range of motion exercises.  Patient amenable to continued treatment plan at this time.    [JS]   2100 Repeat examination after placement of knee brace by EMT shows neurovascularly intact leg, improved pain, and normal sensation distal to the knee.   Reviewed with patient return precautions and ability to return to ED at anytime as needed.  Patient without any further questions, concerns, or needs at this time and is stable for discharge.    [JS]      ED Course User Index  [JS] Salomon Young PA-C                                       EMR Dragon/transcription disclaimer: Much of this encounter note was created utilizing an electronic transcription/translation of spoken language to printed text.  Electronic translation of spoken language may permit erroneous, or at times, nonsensical words or phrases to be in advertently transcribed; although I have reviewed the note for such errors to the best of my ability, some may still exist.          MDM  Number of Diagnoses or Management Options  Effusion of bursa of left knee:      Amount and/or Complexity of Data Reviewed  Tests in the radiology section of CPT®: ordered and reviewed  Decide to obtain previous medical records or to obtain history from someone other than the patient: yes  Review and summarize past medical records: yes    Patient Progress  Patient progress: improved      Final diagnoses:   Effusion of bursa of left knee            Salomon Young PA-C  02/07/20 1210

## 2020-12-19 ENCOUNTER — APPOINTMENT (OUTPATIENT)
Dept: GENERAL RADIOLOGY | Facility: HOSPITAL | Age: 50
End: 2020-12-19

## 2020-12-19 ENCOUNTER — APPOINTMENT (OUTPATIENT)
Dept: CT IMAGING | Facility: HOSPITAL | Age: 50
End: 2020-12-19

## 2020-12-19 ENCOUNTER — HOSPITAL ENCOUNTER (INPATIENT)
Facility: HOSPITAL | Age: 50
LOS: 5 days | Discharge: HOME | End: 2020-12-24
Attending: FAMILY MEDICINE | Admitting: FAMILY MEDICINE

## 2020-12-19 DIAGNOSIS — R10.10 PAIN OF UPPER ABDOMEN: Primary | ICD-10-CM

## 2020-12-19 PROBLEM — R10.9 FLANK PAIN: Status: ACTIVE | Noted: 2020-12-19

## 2020-12-19 LAB
ALBUMIN SERPL-MCNC: 4.1 G/DL (ref 3.5–5.2)
ALBUMIN/GLOB SERPL: 1.4 G/DL
ALP SERPL-CCNC: 83 U/L (ref 39–117)
ALT SERPL W P-5'-P-CCNC: 15 U/L (ref 1–33)
AMYLASE SERPL-CCNC: 24 U/L (ref 28–100)
ANION GAP SERPL CALCULATED.3IONS-SCNC: 15 MMOL/L (ref 5–15)
AST SERPL-CCNC: 16 U/L (ref 1–32)
BACTERIA UR QL AUTO: ABNORMAL /HPF
BASOPHILS # BLD AUTO: 0.03 10*3/MM3 (ref 0–0.2)
BASOPHILS NFR BLD AUTO: 0.2 % (ref 0–1.5)
BILIRUB SERPL-MCNC: 0.6 MG/DL (ref 0–1.2)
BILIRUB UR QL STRIP: NEGATIVE
BUN SERPL-MCNC: 11 MG/DL (ref 6–20)
BUN/CREAT SERPL: 12.4 (ref 7–25)
CALCIUM SPEC-SCNC: 9 MG/DL (ref 8.6–10.5)
CHLORIDE SERPL-SCNC: 101 MMOL/L (ref 98–107)
CLARITY UR: CLEAR
CO2 SERPL-SCNC: 21 MMOL/L (ref 22–29)
COLOR UR: YELLOW
CREAT SERPL-MCNC: 0.89 MG/DL (ref 0.57–1)
D-LACTATE SERPL-SCNC: 1.3 MMOL/L (ref 0.5–2)
D-LACTATE SERPL-SCNC: 1.3 MMOL/L (ref 0.5–2)
DEPRECATED RDW RBC AUTO: 41.7 FL (ref 37–54)
EOSINOPHIL # BLD AUTO: 0.02 10*3/MM3 (ref 0–0.4)
EOSINOPHIL NFR BLD AUTO: 0.1 % (ref 0.3–6.2)
ERYTHROCYTE [DISTWIDTH] IN BLOOD BY AUTOMATED COUNT: 13.7 % (ref 12.3–15.4)
GFR SERPL CREATININE-BSD FRML MDRD: 67 ML/MIN/1.73
GLOBULIN UR ELPH-MCNC: 3 GM/DL
GLUCOSE SERPL-MCNC: 127 MG/DL (ref 65–99)
GLUCOSE UR STRIP-MCNC: NEGATIVE MG/DL
HCT VFR BLD AUTO: 34.8 % (ref 34–46.6)
HGB BLD-MCNC: 12.4 G/DL (ref 12–15.9)
HGB UR QL STRIP.AUTO: NEGATIVE
HYALINE CASTS UR QL AUTO: ABNORMAL /LPF
IMM GRANULOCYTES # BLD AUTO: 0.16 10*3/MM3 (ref 0–0.05)
IMM GRANULOCYTES NFR BLD AUTO: 0.9 % (ref 0–0.5)
KETONES UR QL STRIP: ABNORMAL
LEUKOCYTE ESTERASE UR QL STRIP.AUTO: ABNORMAL
LYMPHOCYTES # BLD AUTO: 1.43 10*3/MM3 (ref 0.7–3.1)
LYMPHOCYTES NFR BLD AUTO: 7.8 % (ref 19.6–45.3)
MCH RBC QN AUTO: 29.9 PG (ref 26.6–33)
MCHC RBC AUTO-ENTMCNC: 35.6 G/DL (ref 31.5–35.7)
MCV RBC AUTO: 83.9 FL (ref 79–97)
MONOCYTES # BLD AUTO: 1.18 10*3/MM3 (ref 0.1–0.9)
MONOCYTES NFR BLD AUTO: 6.4 % (ref 5–12)
NEUTROPHILS NFR BLD AUTO: 15.56 10*3/MM3 (ref 1.7–7)
NEUTROPHILS NFR BLD AUTO: 84.6 % (ref 42.7–76)
NITRITE UR QL STRIP: NEGATIVE
NRBC BLD AUTO-RTO: 0 /100 WBC (ref 0–0.2)
PH UR STRIP.AUTO: 5.5 [PH] (ref 5–8)
PLATELET # BLD AUTO: 225 10*3/MM3 (ref 140–450)
PMV BLD AUTO: 11.2 FL (ref 6–12)
POTASSIUM SERPL-SCNC: 3.2 MMOL/L (ref 3.5–5.2)
PROCALCITONIN SERPL-MCNC: 0.28 NG/ML (ref 0–0.25)
PROT SERPL-MCNC: 7.1 G/DL (ref 6–8.5)
PROT UR QL STRIP: ABNORMAL
RBC # BLD AUTO: 4.15 10*6/MM3 (ref 3.77–5.28)
RBC # UR: ABNORMAL /HPF
REF LAB TEST METHOD: ABNORMAL
SODIUM SERPL-SCNC: 137 MMOL/L (ref 136–145)
SP GR UR STRIP: 1.02 (ref 1–1.03)
SQUAMOUS #/AREA URNS HPF: ABNORMAL /HPF
TROPONIN T SERPL-MCNC: <0.01 NG/ML (ref 0–0.03)
UROBILINOGEN UR QL STRIP: ABNORMAL
WBC # BLD AUTO: 18.38 10*3/MM3 (ref 3.4–10.8)
WBC UR QL AUTO: ABNORMAL /HPF

## 2020-12-19 PROCEDURE — 85025 COMPLETE CBC W/AUTO DIFF WBC: CPT | Performed by: FAMILY MEDICINE

## 2020-12-19 PROCEDURE — 93005 ELECTROCARDIOGRAM TRACING: CPT | Performed by: FAMILY MEDICINE

## 2020-12-19 PROCEDURE — 74176 CT ABD & PELVIS W/O CONTRAST: CPT

## 2020-12-19 PROCEDURE — G0378 HOSPITAL OBSERVATION PER HR: HCPCS

## 2020-12-19 PROCEDURE — 80053 COMPREHEN METABOLIC PANEL: CPT | Performed by: FAMILY MEDICINE

## 2020-12-19 PROCEDURE — 82150 ASSAY OF AMYLASE: CPT | Performed by: FAMILY MEDICINE

## 2020-12-19 PROCEDURE — 84145 PROCALCITONIN (PCT): CPT | Performed by: FAMILY MEDICINE

## 2020-12-19 PROCEDURE — 25010000002 HYDROMORPHONE 1 MG/ML SOLUTION: Performed by: FAMILY MEDICINE

## 2020-12-19 PROCEDURE — 25010000002 CEFTRIAXONE PER 250 MG: Performed by: FAMILY MEDICINE

## 2020-12-19 PROCEDURE — 99221 1ST HOSP IP/OBS SF/LOW 40: CPT | Performed by: FAMILY MEDICINE

## 2020-12-19 PROCEDURE — 25010000002 ONDANSETRON PER 1 MG: Performed by: FAMILY MEDICINE

## 2020-12-19 PROCEDURE — 84484 ASSAY OF TROPONIN QUANT: CPT | Performed by: FAMILY MEDICINE

## 2020-12-19 PROCEDURE — 25010000002 HYDROMORPHONE PER 4 MG: Performed by: FAMILY MEDICINE

## 2020-12-19 PROCEDURE — 99219 PR INITIAL OBSERVATION CARE/DAY 50 MINUTES: CPT | Performed by: UROLOGY

## 2020-12-19 PROCEDURE — 81001 URINALYSIS AUTO W/SCOPE: CPT | Performed by: FAMILY MEDICINE

## 2020-12-19 PROCEDURE — 87040 BLOOD CULTURE FOR BACTERIA: CPT | Performed by: FAMILY MEDICINE

## 2020-12-19 PROCEDURE — 71045 X-RAY EXAM CHEST 1 VIEW: CPT

## 2020-12-19 PROCEDURE — 25010000002 PIPERACILLIN SOD-TAZOBACTAM PER 1 G: Performed by: FAMILY MEDICINE

## 2020-12-19 PROCEDURE — 83605 ASSAY OF LACTIC ACID: CPT | Performed by: FAMILY MEDICINE

## 2020-12-19 PROCEDURE — 93010 ELECTROCARDIOGRAM REPORT: CPT | Performed by: EMERGENCY MEDICINE

## 2020-12-19 RX ORDER — DEXTROSE, SODIUM CHLORIDE, AND POTASSIUM CHLORIDE 5; .45; .15 G/100ML; G/100ML; G/100ML
125 INJECTION INTRAVENOUS CONTINUOUS
Status: DISCONTINUED | OUTPATIENT
Start: 2020-12-19 | End: 2020-12-24

## 2020-12-19 RX ORDER — ALBUTEROL SULFATE 2.5 MG/3ML
2.5 SOLUTION RESPIRATORY (INHALATION) EVERY 6 HOURS PRN
Status: DISCONTINUED | OUTPATIENT
Start: 2020-12-19 | End: 2020-12-24 | Stop reason: HOSPADM

## 2020-12-19 RX ORDER — NITROGLYCERIN 0.4 MG/1
0.4 TABLET SUBLINGUAL
Status: DISCONTINUED | OUTPATIENT
Start: 2020-12-19 | End: 2020-12-24 | Stop reason: HOSPADM

## 2020-12-19 RX ORDER — SODIUM CHLORIDE 0.9 % (FLUSH) 0.9 %
10 SYRINGE (ML) INJECTION EVERY 12 HOURS SCHEDULED
Status: DISCONTINUED | OUTPATIENT
Start: 2020-12-19 | End: 2020-12-24 | Stop reason: HOSPADM

## 2020-12-19 RX ORDER — SODIUM CHLORIDE 0.9 % (FLUSH) 0.9 %
10 SYRINGE (ML) INJECTION AS NEEDED
Status: DISCONTINUED | OUTPATIENT
Start: 2020-12-19 | End: 2020-12-24 | Stop reason: HOSPADM

## 2020-12-19 RX ORDER — AMITRIPTYLINE HYDROCHLORIDE 25 MG/1
50 TABLET, FILM COATED ORAL NIGHTLY
Status: DISCONTINUED | OUTPATIENT
Start: 2020-12-19 | End: 2020-12-24 | Stop reason: HOSPADM

## 2020-12-19 RX ORDER — LATANOPROST 50 UG/ML
1 SOLUTION/ DROPS OPHTHALMIC NIGHTLY
Status: DISCONTINUED | OUTPATIENT
Start: 2020-12-19 | End: 2020-12-24 | Stop reason: HOSPADM

## 2020-12-19 RX ORDER — ASPIRIN 325 MG
325 TABLET ORAL DAILY
Status: DISCONTINUED | OUTPATIENT
Start: 2020-12-19 | End: 2020-12-24 | Stop reason: HOSPADM

## 2020-12-19 RX ORDER — ONDANSETRON 4 MG/1
4 TABLET, FILM COATED ORAL EVERY 6 HOURS PRN
Status: DISCONTINUED | OUTPATIENT
Start: 2020-12-19 | End: 2020-12-24 | Stop reason: HOSPADM

## 2020-12-19 RX ORDER — LEVOTHYROXINE SODIUM 0.12 MG/1
125 TABLET ORAL
Status: DISCONTINUED | OUTPATIENT
Start: 2020-12-20 | End: 2020-12-24 | Stop reason: HOSPADM

## 2020-12-19 RX ORDER — ONDANSETRON 2 MG/ML
4 INJECTION INTRAMUSCULAR; INTRAVENOUS EVERY 6 HOURS PRN
Status: DISCONTINUED | OUTPATIENT
Start: 2020-12-19 | End: 2020-12-24 | Stop reason: HOSPADM

## 2020-12-19 RX ORDER — HYDROMORPHONE HYDROCHLORIDE 1 MG/ML
0.5 INJECTION, SOLUTION INTRAMUSCULAR; INTRAVENOUS; SUBCUTANEOUS
Status: DISCONTINUED | OUTPATIENT
Start: 2020-12-19 | End: 2020-12-24 | Stop reason: HOSPADM

## 2020-12-19 RX ORDER — METOPROLOL TARTRATE 100 MG/1
100 TABLET ORAL EVERY 12 HOURS SCHEDULED
Status: DISCONTINUED | OUTPATIENT
Start: 2020-12-19 | End: 2020-12-24 | Stop reason: HOSPADM

## 2020-12-19 RX ORDER — NALOXONE HCL 0.4 MG/ML
0.4 VIAL (ML) INJECTION
Status: DISCONTINUED | OUTPATIENT
Start: 2020-12-19 | End: 2020-12-24 | Stop reason: HOSPADM

## 2020-12-19 RX ORDER — LOSARTAN POTASSIUM 50 MG/1
50 TABLET ORAL
Status: DISCONTINUED | OUTPATIENT
Start: 2020-12-19 | End: 2020-12-24 | Stop reason: HOSPADM

## 2020-12-19 RX ORDER — DEXTROSE AND SODIUM CHLORIDE 5; .45 G/100ML; G/100ML
100 INJECTION, SOLUTION INTRAVENOUS CONTINUOUS
Status: DISCONTINUED | OUTPATIENT
Start: 2020-12-19 | End: 2020-12-19

## 2020-12-19 RX ORDER — DILTIAZEM HYDROCHLORIDE 60 MG/1
60 TABLET, FILM COATED ORAL EVERY 8 HOURS SCHEDULED
Status: DISCONTINUED | OUTPATIENT
Start: 2020-12-19 | End: 2020-12-24 | Stop reason: HOSPADM

## 2020-12-19 RX ADMIN — DEXTROSE AND SODIUM CHLORIDE 100 ML/HR: 5; 450 INJECTION, SOLUTION INTRAVENOUS at 15:09

## 2020-12-19 RX ADMIN — LOSARTAN POTASSIUM 50 MG: 50 TABLET, FILM COATED ORAL at 16:19

## 2020-12-19 RX ADMIN — ONDANSETRON HYDROCHLORIDE 4 MG: 2 SOLUTION INTRAMUSCULAR; INTRAVENOUS at 20:38

## 2020-12-19 RX ADMIN — CLONIDINE HYDROCHLORIDE 0.3 MG: 0.2 TABLET ORAL at 16:18

## 2020-12-19 RX ADMIN — DILTIAZEM HYDROCHLORIDE 60 MG: 60 TABLET, FILM COATED ORAL at 21:41

## 2020-12-19 RX ADMIN — POTASSIUM CHLORIDE, DEXTROSE MONOHYDRATE AND SODIUM CHLORIDE 125 ML/HR: 150; 5; 450 INJECTION, SOLUTION INTRAVENOUS at 21:38

## 2020-12-19 RX ADMIN — TAZOBACTAM SODIUM AND PIPERACILLIN SODIUM 3.38 G: 375; 3 INJECTION, SOLUTION INTRAVENOUS at 17:05

## 2020-12-19 RX ADMIN — CLONIDINE HYDROCHLORIDE 0.3 MG: 0.2 TABLET ORAL at 21:41

## 2020-12-19 RX ADMIN — METOPROLOL TARTRATE 100 MG: 100 TABLET ORAL at 16:19

## 2020-12-19 RX ADMIN — HYDROMORPHONE HYDROCHLORIDE 0.5 MG: 1 INJECTION, SOLUTION INTRAMUSCULAR; INTRAVENOUS; SUBCUTANEOUS at 14:12

## 2020-12-19 RX ADMIN — ONDANSETRON HYDROCHLORIDE 4 MG: 2 SOLUTION INTRAMUSCULAR; INTRAVENOUS at 14:00

## 2020-12-19 RX ADMIN — AMITRIPTYLINE HYDROCHLORIDE 50 MG: 25 TABLET, FILM COATED ORAL at 21:41

## 2020-12-19 RX ADMIN — HYDROMORPHONE HYDROCHLORIDE 0.5 MG: 1 INJECTION, SOLUTION INTRAMUSCULAR; INTRAVENOUS; SUBCUTANEOUS at 20:32

## 2020-12-19 RX ADMIN — TAZOBACTAM SODIUM AND PIPERACILLIN SODIUM 3.38 G: 375; 3 INJECTION, SOLUTION INTRAVENOUS at 20:32

## 2020-12-19 RX ADMIN — DILTIAZEM HYDROCHLORIDE 60 MG: 60 TABLET, FILM COATED ORAL at 16:18

## 2020-12-19 RX ADMIN — CEFTRIAXONE SODIUM 1 G: 1 INJECTION, POWDER, FOR SOLUTION INTRAMUSCULAR; INTRAVENOUS at 15:19

## 2020-12-19 NOTE — PLAN OF CARE
Goal Outcome Evaluation:  Plan of Care Reviewed With: patient admitted with emesis, fever, elevated wbc and sepsis positive. Up ad stalin. IVF and ABX infusing. C/O left flank pain.  Alert and oriented. COVID 19 positive on 11/23/2020. Has had fever on and off since that time per patient. Nonproductive cough present. Sinus tach on tele

## 2020-12-19 NOTE — H&P
"[facility]  HISTORY AND PHYSICAL    Date of Admission: 12/19/2020  Primary Care Physician: Yoko Smith APRN    Subjective    Chief Complaint: \"my back really hurts and I am so nauseated\"    This is a 50 yr old lady with previous hx of kidney stones who presented with fever to 101, nausea, vomiting and left flank pain for several days. Ct of the abd/pelvis at [facility] suspicous for stone \"just passed\" but she continues with several nausea and left flank pain. She is transferred for urologic opinion.      Review of Systems   Constitutional: Positive for activity change and fever.   HENT: Negative.    Eyes: Negative.    Respiratory: Negative.    Cardiovascular: Negative.    Gastrointestinal: Positive for abdominal pain and nausea.   Endocrine: Negative.    Genitourinary: Positive for flank pain.   Musculoskeletal: Positive for back pain.   Skin: Negative.    Allergic/Immunologic: Negative.    Neurological: Negative.    Hematological: Negative.    Psychiatric/Behavioral: Negative.         Otherwise complete ROS reviewed and negative except as mentioned in the HPI.      Past Medical History:   Past Medical History:   Diagnosis Date   • Disease of thyroid gland    • Hypertension        Past Surgical History:  Past Surgical History:   Procedure Laterality Date   • TUBAL ABDOMINAL LIGATION         Social History:  reports that she has never smoked. She does not have any smokeless tobacco history on file. She reports that she does not drink alcohol or use drugs.    Family History: family history is not on file.     Allergies:  Allergies   Allergen Reactions   • Other Palpitations     Patient reports allergy to IV steriods       Medications:  Prior to Admission medications    Medication Sig Start Date End Date Taking? Authorizing Provider   albuterol (PROVENTIL HFA;VENTOLIN HFA) 108 (90 Base) MCG/ACT inhaler Inhale 2 puffs Every 4 (Four) Hours As Needed for Wheezing.    Provider, MD Danny   amitriptyline " (ELAVIL) 50 MG tablet Take 50 mg by mouth Every Night.    Danyn Alejandra MD   aspirin 325 MG tablet Take 325 mg by mouth Daily.    Danny Alejandra MD   CloNIDine (CATAPRES) 0.3 MG tablet Take 0.3 mg by mouth 3 (Three) Times a Day.    Danny Alejandra MD   cyclobenzaprine (FLEXERIL) 10 MG tablet Take 10 mg by mouth 3 (Three) Times a Day As Needed for Muscle Spasms.    Danny Alejandra MD   diltiaZEM (CARDIZEM) 60 MG tablet Take 60 mg by mouth 2 (Two) Times a Day.    Danny Alejandra MD   ketorolac (TORADOL) 10 MG tablet Take 1 tablet by mouth Every 6 (Six) Hours As Needed for Moderate Pain . 2/4/20   Salomon Young PA-C   latanoprost (XALATAN) 0.005 % ophthalmic solution 1 drop Every Night.    Danny Alejandra MD   levothyroxine (SYNTHROID, LEVOTHROID) 125 MCG tablet Take 125 mcg by mouth Daily.    Danny Alejandra MD   Losartan Potassium (COZAAR PO) Take  by mouth.    Danny Alejandra MD   metoprolol tartrate (LOPRESSOR) 100 MG tablet Take 100 mg by mouth 2 (Two) Times a Day.    Danny Alejandra MD   nitroglycerin (NITROSTAT) 0.4 MG SL tablet Place 0.4 mg under the tongue Every 5 (Five) Minutes As Needed for Chest Pain. Take no more than 3 doses in 15 minutes.    Danny Alejandra MD   polyethylene glycol-electrolytes (NULYTELY WITH FLAVOR PACKS) 420 g solution Take 4,000 mL by mouth See Admin Instructions. 12/12/17   Alejandra Vega APRN   ZOFRAN 8 MG tablet Take 1 tablet by mouth Every 8 (Eight) Hours As Needed for Nausea or Vomiting for up to 10 doses. 12/13/17   Rm Cox MD       Objective    Vital Signs: /82 (BP Location: Left arm)   Pulse 106   Temp 100.4 °F (38 °C) (Oral)   Resp 22   SpO2 100%   Physical Exam  Vitals signs and nursing note reviewed.   Constitutional:       Appearance: Normal appearance.   HENT:      Head: Normocephalic and atraumatic.      Nose: Nose normal.      Mouth/Throat:      Mouth: Mucous membranes are dry.       Pharynx: Oropharynx is clear.   Eyes:      Extraocular Movements: Extraocular movements intact.      Conjunctiva/sclera: Conjunctivae normal.      Pupils: Pupils are equal, round, and reactive to light.   Neck:      Musculoskeletal: Normal range of motion and neck supple.   Cardiovascular:      Rate and Rhythm: Normal rate and regular rhythm.      Pulses: Normal pulses.      Heart sounds: Normal heart sounds.   Pulmonary:      Effort: Pulmonary effort is normal.      Breath sounds: Normal breath sounds.   Abdominal:      General: Abdomen is flat. Bowel sounds are normal.      Palpations: Abdomen is soft.   Musculoskeletal: Normal range of motion.   Skin:     General: Skin is warm and dry.      Capillary Refill: Capillary refill takes less than 2 seconds.   Neurological:      General: No focal deficit present.      Mental Status: She is alert and oriented to person, place, and time. Mental status is at baseline.   Psychiatric:         Mood and Affect: Mood normal.         Behavior: Behavior normal.         Thought Content: Thought content normal.         Judgment: Judgment normal.           Results Reviewed:  Lab Results (last 24 hours)     ** No results found for the last 24 hours. **        Imaging Results (Last 24 Hours)     ** No results found for the last 24 hours. **            Active Hospital Problems    Diagnosis   • Flank pain       Assessment / Plan  Recheck ua, recheck ct , pain and nausea control, urology consultation.      Code Status: full code     I discussed the patient's findings and my recommendations with the patient..    Estimated length of stay 24hrs.    Devonte Espitia MD   12/19/20   12:32 CST

## 2020-12-20 LAB
ALBUMIN SERPL-MCNC: 3.4 G/DL (ref 3.5–5.2)
ALBUMIN/GLOB SERPL: 1.1 G/DL
ALP SERPL-CCNC: 68 U/L (ref 39–117)
ALT SERPL W P-5'-P-CCNC: 13 U/L (ref 1–33)
ANION GAP SERPL CALCULATED.3IONS-SCNC: 10 MMOL/L (ref 5–15)
AST SERPL-CCNC: 14 U/L (ref 1–32)
BACTERIA UR QL AUTO: ABNORMAL /HPF
BASOPHILS # BLD AUTO: 0.04 10*3/MM3 (ref 0–0.2)
BASOPHILS NFR BLD AUTO: 0.3 % (ref 0–1.5)
BILIRUB SERPL-MCNC: 0.5 MG/DL (ref 0–1.2)
BILIRUB UR QL STRIP: NEGATIVE
BUN SERPL-MCNC: 10 MG/DL (ref 6–20)
BUN/CREAT SERPL: 10.2 (ref 7–25)
CALCIUM SPEC-SCNC: 8.2 MG/DL (ref 8.6–10.5)
CHLORIDE SERPL-SCNC: 100 MMOL/L (ref 98–107)
CLARITY UR: CLEAR
CO2 SERPL-SCNC: 25 MMOL/L (ref 22–29)
COLOR UR: YELLOW
CREAT SERPL-MCNC: 0.98 MG/DL (ref 0.57–1)
D-LACTATE SERPL-SCNC: 1.4 MMOL/L (ref 0.5–2)
D-LACTATE SERPL-SCNC: 1.6 MMOL/L (ref 0.5–2)
DEPRECATED RDW RBC AUTO: 43 FL (ref 37–54)
EOSINOPHIL # BLD AUTO: 0.32 10*3/MM3 (ref 0–0.4)
EOSINOPHIL NFR BLD AUTO: 2.2 % (ref 0.3–6.2)
ERYTHROCYTE [DISTWIDTH] IN BLOOD BY AUTOMATED COUNT: 13.8 % (ref 12.3–15.4)
GFR SERPL CREATININE-BSD FRML MDRD: 60 ML/MIN/1.73
GLOBULIN UR ELPH-MCNC: 3 GM/DL
GLUCOSE SERPL-MCNC: 123 MG/DL (ref 65–99)
GLUCOSE UR STRIP-MCNC: NEGATIVE MG/DL
HCT VFR BLD AUTO: 33.5 % (ref 34–46.6)
HGB BLD-MCNC: 11.3 G/DL (ref 12–15.9)
HGB UR QL STRIP.AUTO: ABNORMAL
HYALINE CASTS UR QL AUTO: ABNORMAL /LPF
IMM GRANULOCYTES # BLD AUTO: 0.09 10*3/MM3 (ref 0–0.05)
IMM GRANULOCYTES NFR BLD AUTO: 0.6 % (ref 0–0.5)
KETONES UR QL STRIP: NEGATIVE
LEUKOCYTE ESTERASE UR QL STRIP.AUTO: ABNORMAL
LYMPHOCYTES # BLD AUTO: 2.59 10*3/MM3 (ref 0.7–3.1)
LYMPHOCYTES NFR BLD AUTO: 17.7 % (ref 19.6–45.3)
MCH RBC QN AUTO: 29 PG (ref 26.6–33)
MCHC RBC AUTO-ENTMCNC: 33.7 G/DL (ref 31.5–35.7)
MCV RBC AUTO: 86.1 FL (ref 79–97)
MONOCYTES # BLD AUTO: 0.73 10*3/MM3 (ref 0.1–0.9)
MONOCYTES NFR BLD AUTO: 5 % (ref 5–12)
NEUTROPHILS NFR BLD AUTO: 10.83 10*3/MM3 (ref 1.7–7)
NEUTROPHILS NFR BLD AUTO: 74.2 % (ref 42.7–76)
NITRITE UR QL STRIP: NEGATIVE
NRBC BLD AUTO-RTO: 0 /100 WBC (ref 0–0.2)
PH UR STRIP.AUTO: 6 [PH] (ref 5–8)
PLATELET # BLD AUTO: 207 10*3/MM3 (ref 140–450)
PMV BLD AUTO: 10.8 FL (ref 6–12)
POTASSIUM SERPL-SCNC: 3.2 MMOL/L (ref 3.5–5.2)
PROT SERPL-MCNC: 6.4 G/DL (ref 6–8.5)
PROT UR QL STRIP: NEGATIVE
QT INTERVAL: 364 MS
QTC INTERVAL: 476 MS
RBC # BLD AUTO: 3.89 10*6/MM3 (ref 3.77–5.28)
RBC # UR: ABNORMAL /HPF
REF LAB TEST METHOD: ABNORMAL
SODIUM SERPL-SCNC: 135 MMOL/L (ref 136–145)
SP GR UR STRIP: 1.02 (ref 1–1.03)
SQUAMOUS #/AREA URNS HPF: ABNORMAL /HPF
UROBILINOGEN UR QL STRIP: ABNORMAL
WBC # BLD AUTO: 14.6 10*3/MM3 (ref 3.4–10.8)
WBC UR QL AUTO: ABNORMAL /HPF

## 2020-12-20 PROCEDURE — 25010000002 KETOROLAC TROMETHAMINE PER 15 MG: Performed by: FAMILY MEDICINE

## 2020-12-20 PROCEDURE — 83605 ASSAY OF LACTIC ACID: CPT | Performed by: FAMILY MEDICINE

## 2020-12-20 PROCEDURE — G0378 HOSPITAL OBSERVATION PER HR: HCPCS

## 2020-12-20 PROCEDURE — 99231 SBSQ HOSP IP/OBS SF/LOW 25: CPT | Performed by: FAMILY MEDICINE

## 2020-12-20 PROCEDURE — 81001 URINALYSIS AUTO W/SCOPE: CPT | Performed by: FAMILY MEDICINE

## 2020-12-20 PROCEDURE — 25010000002 PROMETHAZINE PER 50 MG: Performed by: FAMILY MEDICINE

## 2020-12-20 PROCEDURE — 25010000002 HYDROMORPHONE PER 4 MG: Performed by: FAMILY MEDICINE

## 2020-12-20 PROCEDURE — 85025 COMPLETE CBC W/AUTO DIFF WBC: CPT | Performed by: FAMILY MEDICINE

## 2020-12-20 PROCEDURE — 80053 COMPREHEN METABOLIC PANEL: CPT | Performed by: FAMILY MEDICINE

## 2020-12-20 PROCEDURE — 25010000002 PIPERACILLIN SOD-TAZOBACTAM PER 1 G: Performed by: FAMILY MEDICINE

## 2020-12-20 PROCEDURE — 25010000002 ONDANSETRON PER 1 MG: Performed by: FAMILY MEDICINE

## 2020-12-20 PROCEDURE — 87086 URINE CULTURE/COLONY COUNT: CPT | Performed by: FAMILY MEDICINE

## 2020-12-20 RX ORDER — KETOROLAC TROMETHAMINE 30 MG/ML
30 INJECTION, SOLUTION INTRAMUSCULAR; INTRAVENOUS ONCE
Status: COMPLETED | OUTPATIENT
Start: 2020-12-20 | End: 2020-12-20

## 2020-12-20 RX ADMIN — ONDANSETRON HYDROCHLORIDE 4 MG: 2 SOLUTION INTRAMUSCULAR; INTRAVENOUS at 04:02

## 2020-12-20 RX ADMIN — ONDANSETRON HYDROCHLORIDE 4 MG: 2 SOLUTION INTRAMUSCULAR; INTRAVENOUS at 20:57

## 2020-12-20 RX ADMIN — TAZOBACTAM SODIUM AND PIPERACILLIN SODIUM 3.38 G: 375; 3 INJECTION, SOLUTION INTRAVENOUS at 13:46

## 2020-12-20 RX ADMIN — METRONIDAZOLE 500 MG: 500 INJECTION, SOLUTION INTRAVENOUS at 19:51

## 2020-12-20 RX ADMIN — LEVOTHYROXINE SODIUM 125 MCG: 125 TABLET ORAL at 05:31

## 2020-12-20 RX ADMIN — TAZOBACTAM SODIUM AND PIPERACILLIN SODIUM 3.38 G: 375; 3 INJECTION, SOLUTION INTRAVENOUS at 05:32

## 2020-12-20 RX ADMIN — DILTIAZEM HYDROCHLORIDE 60 MG: 60 TABLET, FILM COATED ORAL at 05:31

## 2020-12-20 RX ADMIN — POTASSIUM CHLORIDE, DEXTROSE MONOHYDRATE AND SODIUM CHLORIDE 125 ML/HR: 150; 5; 450 INJECTION, SOLUTION INTRAVENOUS at 08:22

## 2020-12-20 RX ADMIN — ONDANSETRON HYDROCHLORIDE 4 MG: 2 SOLUTION INTRAMUSCULAR; INTRAVENOUS at 11:29

## 2020-12-20 RX ADMIN — TAZOBACTAM SODIUM AND PIPERACILLIN SODIUM 3.38 G: 375; 3 INJECTION, SOLUTION INTRAVENOUS at 20:57

## 2020-12-20 RX ADMIN — KETOROLAC TROMETHAMINE 30 MG: 30 INJECTION, SOLUTION INTRAMUSCULAR; INTRAVENOUS at 21:18

## 2020-12-20 RX ADMIN — POTASSIUM CHLORIDE, DEXTROSE MONOHYDRATE AND SODIUM CHLORIDE 125 ML/HR: 150; 5; 450 INJECTION, SOLUTION INTRAVENOUS at 16:48

## 2020-12-20 RX ADMIN — HYDROMORPHONE HYDROCHLORIDE 0.5 MG: 1 INJECTION, SOLUTION INTRAMUSCULAR; INTRAVENOUS; SUBCUTANEOUS at 04:02

## 2020-12-20 RX ADMIN — HYDROMORPHONE HYDROCHLORIDE 0.5 MG: 1 INJECTION, SOLUTION INTRAMUSCULAR; INTRAVENOUS; SUBCUTANEOUS at 20:57

## 2020-12-20 RX ADMIN — PROMETHAZINE HYDROCHLORIDE 25 MG: 25 INJECTION INTRAMUSCULAR; INTRAVENOUS at 14:26

## 2020-12-20 RX ADMIN — HYDROMORPHONE HYDROCHLORIDE 0.5 MG: 1 INJECTION, SOLUTION INTRAMUSCULAR; INTRAVENOUS; SUBCUTANEOUS at 06:58

## 2020-12-20 RX ADMIN — HYDROMORPHONE HYDROCHLORIDE 0.5 MG: 1 INJECTION, SOLUTION INTRAMUSCULAR; INTRAVENOUS; SUBCUTANEOUS at 11:29

## 2020-12-20 RX ADMIN — HYDROMORPHONE HYDROCHLORIDE 0.5 MG: 1 INJECTION, SOLUTION INTRAMUSCULAR; INTRAVENOUS; SUBCUTANEOUS at 14:26

## 2020-12-20 NOTE — PLAN OF CARE
Goal Outcome Evaluation:  Plan of Care Reviewed With: patient  Progress: no change  Outcome Summary: IVF and IV abx. Zofran was ineffectived, phenergan ordered. IV pain medication given once. Continue to monitor.

## 2020-12-20 NOTE — CONSULTS
Urology    Ms. Stone is 50 y.o. female    REASON FOR CONSULT/CHIEF COMPLAINT: Left flank pain    HPI  50-year-old female transferred from [facility] for evaluation of ongoing left flank pain.  Associated symptoms are bilateral back pain and left lower quadrant abdominal pain.  She is also been having nausea and fevers.  Patient states she has felt sick for the last month.  Patient states she tested positive for Covid on 11/23/2020.  She is also having cough.  Her white count is elevated 18.  She had a CT abdomen pelvis without contrast demonstrated normal appearing kidneys bilaterally, no stone or hydronephrosis.  I reviewed her CT scan independently and agree with the radiology report of no urinary tract abnormality noted.  Her urinalysis is also unremarkable except for small leukocyte esterase and 6-12 WBCs per high-power field.  Is negative for hematuria.  She is not having any hematuria or dysuria.    I independently visualized and reviewed the patient's prior imaging studies today and discussed the imaging findings with the patient.      The following portions of the patient's history were reviewed and updated as appropriate: allergies, current medications, past family history, past medical history, past social history, past surgical history and problem list.    Review of Systems   Constitutional: Positive for fever.   Gastrointestinal: Positive for abdominal pain.   Genitourinary: Negative for difficulty urinating and dysuria.   Musculoskeletal: Positive for back pain.   All other systems reviewed and are negative.      Medications Prior to Admission   Medication Sig Dispense Refill Last Dose   • albuterol (PROVENTIL HFA;VENTOLIN HFA) 108 (90 Base) MCG/ACT inhaler Inhale 2 puffs Every 4 (Four) Hours As Needed for Wheezing.      • amitriptyline (ELAVIL) 50 MG tablet Take 50 mg by mouth Every Night.      • aspirin 325 MG tablet Take 325 mg by mouth Daily.      • CloNIDine (CATAPRES) 0.3 MG tablet Take 0.3 mg by  mouth 3 (Three) Times a Day.      • cyclobenzaprine (FLEXERIL) 10 MG tablet Take 10 mg by mouth 3 (Three) Times a Day As Needed for Muscle Spasms.      • diltiaZEM (CARDIZEM) 60 MG tablet Take 60 mg by mouth 2 (Two) Times a Day.      • ketorolac (TORADOL) 10 MG tablet Take 1 tablet by mouth Every 6 (Six) Hours As Needed for Moderate Pain . 12 tablet 0    • latanoprost (XALATAN) 0.005 % ophthalmic solution 1 drop Every Night.      • levothyroxine (SYNTHROID, LEVOTHROID) 125 MCG tablet Take 125 mcg by mouth Daily.      • Losartan Potassium (COZAAR PO) Take  by mouth.      • metoprolol tartrate (LOPRESSOR) 100 MG tablet Take 100 mg by mouth 2 (Two) Times a Day.      • nitroglycerin (NITROSTAT) 0.4 MG SL tablet Place 0.4 mg under the tongue Every 5 (Five) Minutes As Needed for Chest Pain. Take no more than 3 doses in 15 minutes.      • polyethylene glycol-electrolytes (NULYTELY WITH FLAVOR PACKS) 420 g solution Take 4,000 mL by mouth See Admin Instructions. 4000 mL 0    • ZOFRAN 8 MG tablet Take 1 tablet by mouth Every 8 (Eight) Hours As Needed for Nausea or Vomiting for up to 10 doses. 10 tablet 1          Current Facility-Administered Medications:   •  albuterol (PROVENTIL) nebulizer solution 0.083% 2.5 mg/3mL, 2.5 mg, Nebulization, Q6H PRN, Devonte Espitia MD  •  amitriptyline (ELAVIL) tablet 50 mg, 50 mg, Oral, Nightly, Devonte Espitia MD  •  aspirin tablet 325 mg, 325 mg, Oral, Daily, Devonte Espitia MD  •  cloNIDine (CATAPRES) tablet 0.3 mg, 0.3 mg, Oral, TID, Devonte Espitia MD, 0.3 mg at 12/19/20 1618  •  dextrose 5 % and sodium chloride 0.45 % with KCl 20 mEq/L infusion, 125 mL/hr, Intravenous, Continuous, Devonte Espitia MD, Stopped at 12/19/20 1928  •  dilTIAZem (CARDIZEM) tablet 60 mg, 60 mg, Oral, Q8H, Devonte Espitia MD, 60 mg at 12/19/20 1618  •  HYDROmorphone (DILAUDID) injection 0.5 mg, 0.5 mg, Intravenous, Q2H PRN, 0.5 mg at 12/19/20 1412 **AND** naloxone  (NARCAN) injection 0.4 mg, 0.4 mg, Intravenous, Q5 Min PRN, Devonte Espitia MD  •  latanoprost (XALATAN) 0.005 % ophthalmic solution 1 drop, 1 drop, Both Eyes, Nightly, Devonte Espitia MD  •  [START ON 12/20/2020] levothyroxine (SYNTHROID, LEVOTHROID) tablet 125 mcg, 125 mcg, Oral, Q AM, Devonte Espitia MD  •  losartan (COZAAR) tablet 50 mg, 50 mg, Oral, Q24H, Devonte Espitia MD, 50 mg at 12/19/20 1619  •  metoprolol tartrate (LOPRESSOR) tablet 100 mg, 100 mg, Oral, Q12H, Devonte Espitia MD, 100 mg at 12/19/20 1619  •  nitroglycerin (NITROSTAT) SL tablet 0.4 mg, 0.4 mg, Sublingual, Q5 Min PRN, Devonte Espitia MD  •  ondansetron (ZOFRAN) tablet 4 mg, 4 mg, Oral, Q6H PRN **OR** ondansetron (ZOFRAN) injection 4 mg, 4 mg, Intravenous, Q6H PRN, Devonte Espitia MD, 4 mg at 12/19/20 1400  •  piperacillin-tazobactam (ZOSYN) 3.375 g in iso-osmotic dextrose 50 ml (premix), 3.375 g, Intravenous, Q8H, Devonte Espitia MD  •  sodium chloride 0.9 % flush 10 mL, 10 mL, Intravenous, Q12H, Devonte Espitia MD  •  sodium chloride 0.9 % flush 10 mL, 10 mL, Intravenous, PRN, Devonte Espitia MD    Past Medical History:   Diagnosis Date   • Disease of thyroid gland    • Hypertension        Past Surgical History:   Procedure Laterality Date   • TUBAL ABDOMINAL LIGATION         Social History     Socioeconomic History   • Marital status:      Spouse name: Not on file   • Number of children: Not on file   • Years of education: Not on file   • Highest education level: Not on file   Tobacco Use   • Smoking status: Never Smoker   Substance and Sexual Activity   • Alcohol use: No   • Drug use: No   • Sexual activity: Defer       No family history on file.    BP 96/74 (BP Location: Left arm, Patient Position: Lying)   Pulse 82   Temp 98.3 °F (36.8 °C) (Axillary)   Resp 18   SpO2 99%     Physical Exam   Constitutional: Well nourished, Well developed; No apparent  distress; Vital reviewed as above  Psychiatric: Appropriate affect; Alert and oriented  Eyes: Unremarkable  Musculoskeletal: Normal gait and station  GI: Abdomen is obese, soft and nondistended.  There is no costovertebral angle tenderness noted.  She does have some tenderness to deep palpation of the left lower quadrant.  Respiratory: No distress; Unlabored movement; No accessory musculature needed with symmetric movements  Skin: No pallor or diaphoresis  Lymphatic: No adenopathy neck or groin    Lab Results   Component Value Date    GLUCOSE 127 (H) 12/19/2020    BUN 11 12/19/2020    CREATININE 0.89 12/19/2020    EGFRIFNONA 67 12/19/2020    BCR 12.4 12/19/2020    CO2 21.0 (L) 12/19/2020    CALCIUM 9.0 12/19/2020    ALBUMIN 4.10 12/19/2020    AST 16 12/19/2020    ALT 15 12/19/2020     Lab Results   Component Value Date    GLUCOSE 127 (H) 12/19/2020    CALCIUM 9.0 12/19/2020     12/19/2020    K 3.2 (L) 12/19/2020    CO2 21.0 (L) 12/19/2020     12/19/2020    BUN 11 12/19/2020    CREATININE 0.89 12/19/2020    EGFRIFNONA 67 12/19/2020    BCR 12.4 12/19/2020    ANIONGAP 15.0 12/19/2020     Lab Results   Component Value Date    WBC 18.38 (H) 12/19/2020    HGB 12.4 12/19/2020    HCT 34.8 12/19/2020    MCV 83.9 12/19/2020     12/19/2020     No results found for: PSA  No results found for: URINECX  Brief Urine Lab Results  (Last result in the past 365 days)      Color   Clarity   Blood   Leuk Est   Nitrite   Protein   CREAT   Urine HCG        12/19/20 1711 Yellow Clear Negative Small (1+) Negative Trace               Imaging Results (Last 7 Days)     Procedure Component Value Units Date/Time    XR Chest 1 View [[id number]] Collected: 12/19/20 1406     Updated: 12/19/20 1410    Narrative:      Frontal upright radiograph of the chest 12/19/2020 1:39 PM CST     COMPARISON: May 29, 2019.     HISTORY: Flank pain.     FINDINGS:   The lungs are clear. The cardiomediastinal silhouette and pulmonary  vascularity are  within normal limits.      The osseous structures and surrounding soft tissues demonstrate no acute  abnormality.       Impression:      1. No radiographic evidence of acute cardiopulmonary process.        This report was finalized on 12/19/2020 14:06 by Dr. Tal Cook MD.    CT Abdomen Pelvis Stone Protocol [[id number]] Collected: 12/19/20 1253     Updated: 12/19/20 1259    Narrative:      EXAMINATION:   CT ABDOMEN PELVIS STONE PROTOCOL-  12/19/2020 12:53 PM  CST     HISTORY: Flank pain CT ABDOMEN PELVIS STONE PROTOCOL- 12/19/2020 12:34  PM CST     HISTORY: Flank pain, kidney stone suspected      COMPARISON: None      DOSE LENGTH PRODUCT: 734 mGy cm. Automated exposure control was also  utilized to decrease patient radiation dose.     TECHNIQUE: Noncontrast enhanced images of the abdomen and pelvis  obtained without oral contrast. Multiplanar reformatted images were  provided for review.      FINDINGS:   The lung bases and base of the heart are unremarkable.      LIVER: No focal liver lesion.      BILIARY SYSTEM: The gallbladder is unremarkable. No intrahepatic or  extrahepatic ductal dilatation.      PANCREAS: No focal pancreatic lesion.      SPLEEN: Unremarkable.      KIDNEYS AND ADRENALS: Bilateral kidneys and adrenal glands are  unremarkable.  The ureters are decompressed and normal in appearance.     RETROPERITONEUM: No mass, lymphadenopathy or hemorrhage.      GI TRACT: No evidence of obstruction or bowel wall thickening. The  appendix is visualized and unremarkable. Diverticulosis is noted     OTHER: There is no mesenteric mass, lymphadenopathy or fluid collection.  The osseous structures and soft tissues demonstrate no worrisome  lesions.     PELVIS: The uterus is visualized.. The urinary bladder is normal in  appearance.       Impression:      1. Diverticulosis. No acute intra-abdominal or pelvic abnormality is  identified.         This report was finalized on 12/19/2020 12:56 by Dr. Tal Cook MD.              Assessment and Plan  Leukocytosis with bilateral back pain and left lower quadrant abdominal pain associated with reported history of fever, cough, Covid positivity with normal kidneys on her noncontrast CT abdomen pelvis.  No stone or hydronephrosis.  There are no current identifiable urological surgical indications.  Recommended medical treatment for possible pyelonephritis and/or GI/other possible causes for her symptoms.  I discussed this assessment with the patient.  If the patient were to continue having fevers and/or ongoing flank or abdominal pain despite antibiotics, I would recommend a CT abdomen and pelvis with oral and IV contrast along with general surgery/GI evaluation. Please call with any questions.    (Please note that portions of this note were completed with a voice recognition program.)  Grayson Velazquez MD  12/19/20  20:02 CST

## 2020-12-20 NOTE — PLAN OF CARE
Goal Outcome Evaluation:  Plan of Care Reviewed With: patient  Progress: no change  Outcome Summary: IVF; IV abx; Medicated for pain and nausea x1; up ad stalin; void; resting between care; will cont to monitor

## 2020-12-21 LAB
ALBUMIN SERPL-MCNC: 3.1 G/DL (ref 3.5–5.2)
ALBUMIN/GLOB SERPL: 1 G/DL
ALP SERPL-CCNC: 69 U/L (ref 39–117)
ALT SERPL W P-5'-P-CCNC: 12 U/L (ref 1–33)
ANION GAP SERPL CALCULATED.3IONS-SCNC: 8 MMOL/L (ref 5–15)
AST SERPL-CCNC: 15 U/L (ref 1–32)
BACTERIA SPEC AEROBE CULT: NO GROWTH
BILIRUB SERPL-MCNC: 0.5 MG/DL (ref 0–1.2)
BUN SERPL-MCNC: 6 MG/DL (ref 6–20)
BUN/CREAT SERPL: 8.5 (ref 7–25)
CALCIUM SPEC-SCNC: 8.1 MG/DL (ref 8.6–10.5)
CHLORIDE SERPL-SCNC: 103 MMOL/L (ref 98–107)
CO2 SERPL-SCNC: 25 MMOL/L (ref 22–29)
CREAT SERPL-MCNC: 0.71 MG/DL (ref 0.57–1)
CRP SERPL-MCNC: 13.82 MG/DL (ref 0–0.5)
DEPRECATED RDW RBC AUTO: 41.7 FL (ref 37–54)
ERYTHROCYTE [DISTWIDTH] IN BLOOD BY AUTOMATED COUNT: 13.3 % (ref 12.3–15.4)
ERYTHROCYTE [SEDIMENTATION RATE] IN BLOOD: 61 MM/HR (ref 0–20)
GFR SERPL CREATININE-BSD FRML MDRD: 86 ML/MIN/1.73
GLOBULIN UR ELPH-MCNC: 3.2 GM/DL
GLUCOSE SERPL-MCNC: 118 MG/DL (ref 65–99)
HCT VFR BLD AUTO: 31.2 % (ref 34–46.6)
HGB BLD-MCNC: 10.6 G/DL (ref 12–15.9)
MCH RBC QN AUTO: 28.9 PG (ref 26.6–33)
MCHC RBC AUTO-ENTMCNC: 34 G/DL (ref 31.5–35.7)
MCV RBC AUTO: 85 FL (ref 79–97)
PLATELET # BLD AUTO: 180 10*3/MM3 (ref 140–450)
PMV BLD AUTO: 11.3 FL (ref 6–12)
POTASSIUM SERPL-SCNC: 3.1 MMOL/L (ref 3.5–5.2)
PROT SERPL-MCNC: 6.3 G/DL (ref 6–8.5)
RBC # BLD AUTO: 3.67 10*6/MM3 (ref 3.77–5.28)
SODIUM SERPL-SCNC: 136 MMOL/L (ref 136–145)
WBC # BLD AUTO: 11.29 10*3/MM3 (ref 3.4–10.8)

## 2020-12-21 PROCEDURE — 25010000002 PIPERACILLIN SOD-TAZOBACTAM PER 1 G: Performed by: FAMILY MEDICINE

## 2020-12-21 PROCEDURE — 99222 1ST HOSP IP/OBS MODERATE 55: CPT | Performed by: INTERNAL MEDICINE

## 2020-12-21 PROCEDURE — G0378 HOSPITAL OBSERVATION PER HR: HCPCS

## 2020-12-21 PROCEDURE — 25010000002 HYDROMORPHONE PER 4 MG: Performed by: FAMILY MEDICINE

## 2020-12-21 PROCEDURE — 25010000002 PROMETHAZINE PER 50 MG: Performed by: FAMILY MEDICINE

## 2020-12-21 PROCEDURE — 99231 SBSQ HOSP IP/OBS SF/LOW 25: CPT | Performed by: FAMILY MEDICINE

## 2020-12-21 PROCEDURE — 85027 COMPLETE CBC AUTOMATED: CPT | Performed by: FAMILY MEDICINE

## 2020-12-21 PROCEDURE — 25010000002 KETOROLAC TROMETHAMINE PER 15 MG: Performed by: FAMILY MEDICINE

## 2020-12-21 PROCEDURE — 86140 C-REACTIVE PROTEIN: CPT | Performed by: FAMILY MEDICINE

## 2020-12-21 PROCEDURE — 80053 COMPREHEN METABOLIC PANEL: CPT | Performed by: FAMILY MEDICINE

## 2020-12-21 PROCEDURE — 25010000002 ONDANSETRON PER 1 MG: Performed by: FAMILY MEDICINE

## 2020-12-21 PROCEDURE — 85651 RBC SED RATE NONAUTOMATED: CPT | Performed by: FAMILY MEDICINE

## 2020-12-21 RX ORDER — FAMOTIDINE 10 MG/ML
20 INJECTION, SOLUTION INTRAVENOUS EVERY 12 HOURS SCHEDULED
Status: DISCONTINUED | OUTPATIENT
Start: 2020-12-21 | End: 2020-12-23

## 2020-12-21 RX ORDER — KETOROLAC TROMETHAMINE 30 MG/ML
30 INJECTION, SOLUTION INTRAMUSCULAR; INTRAVENOUS ONCE
Status: COMPLETED | OUTPATIENT
Start: 2020-12-21 | End: 2020-12-21

## 2020-12-21 RX ORDER — CALCIUM CARBONATE 200(500)MG
2 TABLET,CHEWABLE ORAL 3 TIMES DAILY PRN
Status: DISCONTINUED | OUTPATIENT
Start: 2020-12-21 | End: 2020-12-24 | Stop reason: HOSPADM

## 2020-12-21 RX ADMIN — LEVOTHYROXINE SODIUM 125 MCG: 125 TABLET ORAL at 06:01

## 2020-12-21 RX ADMIN — POTASSIUM CHLORIDE, DEXTROSE MONOHYDRATE AND SODIUM CHLORIDE 125 ML/HR: 150; 5; 450 INJECTION, SOLUTION INTRAVENOUS at 14:21

## 2020-12-21 RX ADMIN — AMITRIPTYLINE HYDROCHLORIDE 50 MG: 25 TABLET, FILM COATED ORAL at 20:55

## 2020-12-21 RX ADMIN — ONDANSETRON HYDROCHLORIDE 4 MG: 2 SOLUTION INTRAMUSCULAR; INTRAVENOUS at 21:46

## 2020-12-21 RX ADMIN — METRONIDAZOLE 500 MG: 500 INJECTION, SOLUTION INTRAVENOUS at 10:56

## 2020-12-21 RX ADMIN — DILTIAZEM HYDROCHLORIDE 60 MG: 60 TABLET, FILM COATED ORAL at 15:49

## 2020-12-21 RX ADMIN — HYDROMORPHONE HYDROCHLORIDE 0.5 MG: 1 INJECTION, SOLUTION INTRAMUSCULAR; INTRAVENOUS; SUBCUTANEOUS at 21:46

## 2020-12-21 RX ADMIN — CLONIDINE HYDROCHLORIDE 0.3 MG: 0.2 TABLET ORAL at 15:49

## 2020-12-21 RX ADMIN — HYDROMORPHONE HYDROCHLORIDE 0.5 MG: 1 INJECTION, SOLUTION INTRAMUSCULAR; INTRAVENOUS; SUBCUTANEOUS at 15:46

## 2020-12-21 RX ADMIN — TAZOBACTAM SODIUM AND PIPERACILLIN SODIUM 3.38 G: 375; 3 INJECTION, SOLUTION INTRAVENOUS at 20:56

## 2020-12-21 RX ADMIN — FAMOTIDINE 20 MG: 10 INJECTION INTRAVENOUS at 20:56

## 2020-12-21 RX ADMIN — CLONIDINE HYDROCHLORIDE 0.3 MG: 0.2 TABLET ORAL at 20:56

## 2020-12-21 RX ADMIN — METOPROLOL TARTRATE 100 MG: 100 TABLET ORAL at 20:56

## 2020-12-21 RX ADMIN — ONDANSETRON HYDROCHLORIDE 4 MG: 2 SOLUTION INTRAMUSCULAR; INTRAVENOUS at 09:59

## 2020-12-21 RX ADMIN — METRONIDAZOLE 500 MG: 500 INJECTION, SOLUTION INTRAVENOUS at 18:56

## 2020-12-21 RX ADMIN — METRONIDAZOLE 500 MG: 500 INJECTION, SOLUTION INTRAVENOUS at 02:48

## 2020-12-21 RX ADMIN — HYDROMORPHONE HYDROCHLORIDE 0.5 MG: 1 INJECTION, SOLUTION INTRAMUSCULAR; INTRAVENOUS; SUBCUTANEOUS at 03:52

## 2020-12-21 RX ADMIN — ONDANSETRON HYDROCHLORIDE 4 MG: 2 SOLUTION INTRAMUSCULAR; INTRAVENOUS at 03:52

## 2020-12-21 RX ADMIN — ONDANSETRON HYDROCHLORIDE 4 MG: 2 SOLUTION INTRAMUSCULAR; INTRAVENOUS at 15:46

## 2020-12-21 RX ADMIN — POTASSIUM CHLORIDE, DEXTROSE MONOHYDRATE AND SODIUM CHLORIDE 125 ML/HR: 150; 5; 450 INJECTION, SOLUTION INTRAVENOUS at 02:48

## 2020-12-21 RX ADMIN — TAZOBACTAM SODIUM AND PIPERACILLIN SODIUM 3.38 G: 375; 3 INJECTION, SOLUTION INTRAVENOUS at 06:00

## 2020-12-21 RX ADMIN — DILTIAZEM HYDROCHLORIDE 60 MG: 60 TABLET, FILM COATED ORAL at 21:01

## 2020-12-21 RX ADMIN — ANTACID TABLETS 2 TABLET: 500 TABLET, CHEWABLE ORAL at 20:56

## 2020-12-21 RX ADMIN — PROMETHAZINE HYDROCHLORIDE 25 MG: 25 INJECTION INTRAMUSCULAR; INTRAVENOUS at 04:44

## 2020-12-21 RX ADMIN — TAZOBACTAM SODIUM AND PIPERACILLIN SODIUM 3.38 G: 375; 3 INJECTION, SOLUTION INTRAVENOUS at 14:20

## 2020-12-21 RX ADMIN — DILTIAZEM HYDROCHLORIDE 60 MG: 60 TABLET, FILM COATED ORAL at 06:01

## 2020-12-21 RX ADMIN — KETOROLAC TROMETHAMINE 30 MG: 30 INJECTION, SOLUTION INTRAMUSCULAR; INTRAVENOUS at 11:11

## 2020-12-21 NOTE — H&P (VIEW-ONLY)
"        [facility]  Inpatient Consult Note  Today's date:  12/21/20    Gale Stone  1970       Referring Provider: Devonte Espitia MD  Primary Physician: Yoko Smith APRN   Primary Gastroenterologist: Dr. Maurice Moss    Date of Admission: 12/19/2020  Date of Service:  12/21/20    Reason for Consultation/Chief Complaint: Fever, mid abd and flank pain    History of present illness: This is a very pleasant 50-year-old female who tells me that \"I had Covid on 23 November and have not felt well since that time.\"  The patient states that she has been having nausea and vomiting for 1 week along with fever.  She states she also notes left flank pain. The patient was initially seen by Dr. Devonte Espitia 12/19/2020 with complaints of left flank pain for several days, nausea, vomiting and fever of 101. She notes \"abdominal pain from the belly button up\". The patient denies any dysphagia, pyrosis or hematemesis.  The patient denies any chills.  Denies any melena or hematochezia.  Denies any unintentional weight loss or loss of appetite.  Last endo was \"years ago\"--none on file from our office.  She states she had H pylori and was treated at [facility].      The patient's last colonoscopy was performed on 1/9/2018 per Dr. Maurice Moss for acute infectious colitis with norovirus.  Impression resolved colitis small diminutive polyp removed.    Labs and imaging : CMP today reveals hyperglycemia, hypokalemia, hypocalcemia, CRP 13.8, CBC with a WBC of 11.2 with left shift.  Hemoglobin 10.6    Study Result    EXAMINATION:   CT ABDOMEN PELVIS STONE PROTOCOL-  12/19/2020 12:53 PM  CST     HISTORY: Flank pain CT ABDOMEN PELVIS STONE PROTOCOL- 12/19/2020 12:34  PM CST     HISTORY: Flank pain, kidney stone suspected      COMPARISON: None       IMPRESSION:  1. Diverticulosis. No acute intra-abdominal or pelvic abnormality is  identified.         This report was finalized on 12/19/2020 12:56 by Dr." Tal Cook MD.      Past Medical History:   Diagnosis Date   • Disease of thyroid gland    • Hypertension        Past Surgical History:   Procedure Laterality Date   • TUBAL ABDOMINAL LIGATION          Allergies   Allergen Reactions   • Other Palpitations     Patient reports allergy to IV steriods       Medications Prior to Admission   Medication Sig Dispense Refill Last Dose   • albuterol (PROVENTIL HFA;VENTOLIN HFA) 108 (90 Base) MCG/ACT inhaler Inhale 2 puffs Every 4 (Four) Hours As Needed for Wheezing.      • amitriptyline (ELAVIL) 50 MG tablet Take 50 mg by mouth Every Night.      • aspirin 325 MG tablet Take 325 mg by mouth Daily.      • CloNIDine (CATAPRES) 0.3 MG tablet Take 0.3 mg by mouth 3 (Three) Times a Day.      • cyclobenzaprine (FLEXERIL) 10 MG tablet Take 10 mg by mouth 3 (Three) Times a Day As Needed for Muscle Spasms.      • diltiaZEM (CARDIZEM) 60 MG tablet Take 60 mg by mouth 2 (Two) Times a Day.      • ketorolac (TORADOL) 10 MG tablet Take 1 tablet by mouth Every 6 (Six) Hours As Needed for Moderate Pain . 12 tablet 0    • latanoprost (XALATAN) 0.005 % ophthalmic solution 1 drop Every Night.      • levothyroxine (SYNTHROID, LEVOTHROID) 125 MCG tablet Take 125 mcg by mouth Daily.      • Losartan Potassium (COZAAR PO) Take  by mouth.      • metoprolol tartrate (LOPRESSOR) 100 MG tablet Take 100 mg by mouth 2 (Two) Times a Day.      • nitroglycerin (NITROSTAT) 0.4 MG SL tablet Place 0.4 mg under the tongue Every 5 (Five) Minutes As Needed for Chest Pain. Take no more than 3 doses in 15 minutes.      • polyethylene glycol-electrolytes (NULYTELY WITH FLAVOR PACKS) 420 g solution Take 4,000 mL by mouth See Admin Instructions. 4000 mL 0    • ZOFRAN 8 MG tablet Take 1 tablet by mouth Every 8 (Eight) Hours As Needed for Nausea or Vomiting for up to 10 doses. 10 tablet 1        Hospital Medications (active)       Dose Frequency Start End    albuterol (PROVENTIL) nebulizer solution 0.083% 2.5 mg/3mL  "2.5 mg Every 6 Hours PRN 12/19/2020     Route: Nebulization    amitriptyline (ELAVIL) tablet 50 mg 50 mg Nightly 12/19/2020     Route: Oral    aspirin tablet 325 mg 325 mg Daily 12/19/2020     Admin Instructions: Do not exceed 4 grams of aspirin in a 24 hr period.<BR><BR>If given for pain, use the following pain scale: <BR>Mild Pain = Pain Score of 1-3, CPOT 1-2<BR>Moderate Pain = Pain Score of 4-6, CPOT 3-4<BR>Severe Pain = Pain Score of 7-10, CPOT 5-8    Route: Oral    cloNIDine (CATAPRES) tablet 0.3 mg 0.3 mg 3 Times Daily 12/19/2020     Admin Instructions: Caution: Look alike/sound alike drug alert. Please read the label.<BR>Caution: Look alike/sound alike drug alert.    Route: Oral    dextrose 5 % and sodium chloride 0.45 % with KCl 20 mEq/L infusion 125 mL/hr Continuous 12/19/2020     Route: Intravenous    dilTIAZem (CARDIZEM) tablet 60 mg 60 mg Every 8 Hours Scheduled 12/19/2020     Admin Instructions: Caution: Look alike/sound alike drug alert.  Take on empty stomach 1 hr before or 2 hrs after meals. Avoid grapefruit juice. Maximum simvastatin dose 10 mg    Route: Oral    HYDROmorphone (DILAUDID) injection 0.5 mg 0.5 mg Every 2 Hours PRN 12/19/2020 12/26/2020    Admin Instructions: If given for pain, use the following pain scale:<BR>Mild Pain = Pain Score of 1-3, CPOT 1-2<BR>Moderate Pain = Pain Score of 4-6, CPOT 3-4<BR>Severe Pain = Pain Score of 7-10, CPOT 5-8    Route: Intravenous    Linked Group 1: \"And\" Linked Group Details        latanoprost (XALATAN) 0.005 % ophthalmic solution 1 drop 1 drop Nightly 12/19/2020     Route: Both Eyes    levothyroxine (SYNTHROID, LEVOTHROID) tablet 125 mcg 125 mcg Every Early Morning 12/20/2020     Admin Instructions: Take on empty stomach.    Route: Oral    losartan (COZAAR) tablet 50 mg 50 mg Every 24 Hours Scheduled 12/19/2020     Route: Oral    metoprolol tartrate (LOPRESSOR) tablet 100 mg 100 mg Every 12 Hours Scheduled 12/19/2020     Route: Oral    metroNIDAZOLE " "(FLAGYL) 500 mg/100mL IVPB 500 mg Every 8 Hours 12/20/2020 12/27/2020    Admin Instructions: Caution: Look alike/sound alike drug alert.  Do not refrigerate.    Route: Intravenous    naloxone (NARCAN) injection 0.4 mg 0.4 mg Every 5 Minutes PRN 12/19/2020     Admin Instructions: If Respiratory Rate Less Than 8 or Patient is Difficult to Arouse, Stop ALL Narcotics & Contact Provider.<BR>Administer Slow IV Push.  Repeat As Ordered Until Respiratory Rate is Greater Than 12.    Route: Intravenous    Linked Group 1: \"And\" Linked Group Details        nitroglycerin (NITROSTAT) SL tablet 0.4 mg 0.4 mg Every 5 Minutes PRN 12/19/2020     Admin Instructions: May administer up to 3 doses per episode.    Route: Sublingual    ondansetron (ZOFRAN) injection 4 mg 4 mg Every 6 Hours PRN 12/19/2020     Admin Instructions: If BOTH ondansetron (ZOFRAN) and promethazine (PHENERGAN) are ordered use ondansetron first and THEN promethazine IF ondansetron is ineffective.    Route: Intravenous    Linked Group 2: \"Or\" Linked Group Details        ondansetron (ZOFRAN) tablet 4 mg 4 mg Every 6 Hours PRN 12/19/2020     Admin Instructions: If BOTH ondansetron (ZOFRAN) and promethazine (PHENERGAN) are ordered use ondansetron first and THEN promethazine IF ondansetron is ineffective.    Route: Oral    Linked Group 2: \"Or\" Linked Group Details        piperacillin-tazobactam (ZOSYN) 3.375 g in iso-osmotic dextrose 50 ml (premix) 3.375 g Every 8 Hours 12/19/2020 12/26/2020    Admin Instructions: Refrigerate    Route: Intravenous    promethazine (PHENERGAN) 25 mg in sodium chloride 0.9 % 50 mL 25 mg Every 6 Hours PRN 12/20/2020     Admin Instructions: Must dilute in 50 mL NS.Administer via large-bore vein (not hand or wrist).    Route: Intravenous    sodium chloride 0.9 % flush 10 mL 10 mL Every 12 Hours Scheduled 12/19/2020     Route: Intravenous    sodium chloride 0.9 % flush 10 mL 10 mL As Needed 12/19/2020     Route: Intravenous          Social " History     Tobacco Use   • Smoking status: Never Smoker   Substance Use Topics   • Alcohol use: No        Past Family History:  No family history on file.    Review of Systems:  Review of Systems   Constitutional: Negative for fever and unexpected weight change.   HENT: Negative for hearing loss.    Eyes: Negative for visual disturbance.   Respiratory: Negative for cough.    Cardiovascular: Negative for chest pain.   Gastrointestinal:        See HPI   Endocrine: Negative for cold intolerance and heat intolerance.   Genitourinary: Negative for dysuria.   Musculoskeletal: Negative for arthralgias.   Skin: Negative for rash.   Neurological: Negative for seizures.   Psychiatric/Behavioral: Negative for hallucinations.       Physical Exam:  Temp:  [98.6 °F (37 °C)-101.2 °F (38.4 °C)] 98.9 °F (37.2 °C)  Heart Rate:  [70-87] 72  Resp:  [18-20] 20  BP: (116-156)/(64-85) 156/84  There is no height or weight on file to calculate BMI.    Intake/Output Summary (Last 24 hours) at 12/21/2020 1610  Last data filed at 12/21/2020 1420  Gross per 24 hour   Intake 3296 ml   Output --   Net 3296 ml     I/O this shift:  In: 1286 [I.V.:1286]  Out: -   Physical Exam  Vitals signs reviewed.   Constitutional:       Appearance: She is well-developed.   Cardiovascular:      Rate and Rhythm: Normal rate and regular rhythm.      Heart sounds: Normal heart sounds. No murmur. No friction rub. No gallop.    Pulmonary:      Effort: Pulmonary effort is normal.      Breath sounds: Normal breath sounds. No wheezing or rales.   Abdominal:      General: Bowel sounds are normal. There is no distension.      Palpations: Abdomen is soft. Abdomen is not rigid.      Tenderness: There is abdominal tenderness (Left flank pain radiating around to left lower quadrant). There is no guarding or rebound.   Musculoskeletal: Normal range of motion.         General: No tenderness or deformity.   Skin:     General: Skin is warm and dry.      Findings: No rash.    Neurological:      Mental Status: She is alert and oriented to person, place, and time.   Psychiatric:         Behavior: Behavior normal.         Thought Content: Thought content normal.         Judgment: Judgment normal.         Results Review:  Lab Results (last 24 hours)     Procedure Component Value Units Date/Time    Blood Culture - Blood, Arm, Left [[id number]] Collected: 12/19/20 1509    Specimen: Blood from Arm, Left Updated: 12/21/20 1530     Blood Culture No growth at 2 days    Urine Culture - Urine, Urine, Clean Catch [[id number]]  (Normal) Collected: 12/20/20 1102    Specimen: Urine, Clean Catch Updated: 12/21/20 0840     Urine Culture No growth    Sedimentation Rate [[id number]]  (Abnormal) Collected: 12/21/20 0547    Specimen: Blood Updated: 12/21/20 0705     Sed Rate 61 mm/hr     C-reactive Protein [[id number]]  (Abnormal) Collected: 12/21/20 0547    Specimen: Blood Updated: 12/21/20 0642     C-Reactive Protein 13.82 mg/dL     Comprehensive Metabolic Panel [[id number]]  (Abnormal) Collected: 12/21/20 0547    Specimen: Blood Updated: 12/21/20 0641     Glucose 118 mg/dL      BUN 6 mg/dL      Creatinine 0.71 mg/dL      Sodium 136 mmol/L      Potassium 3.1 mmol/L      Chloride 103 mmol/L      CO2 25.0 mmol/L      Calcium 8.1 mg/dL      Total Protein 6.3 g/dL      Albumin 3.10 g/dL      ALT (SGPT) 12 U/L      AST (SGOT) 15 U/L      Alkaline Phosphatase 69 U/L      Total Bilirubin 0.5 mg/dL      eGFR Non African Amer 86 mL/min/1.73      Globulin 3.2 gm/dL      A/G Ratio 1.0 g/dL      BUN/Creatinine Ratio 8.5     Anion Gap 8.0 mmol/L     Narrative:      GFR Normal >60  Chronic Kidney Disease <60  Kidney Failure <15      CBC (No Diff) [[id number]]  (Abnormal) Collected: 12/21/20 0547    Specimen: Blood Updated: 12/21/20 0617     WBC 11.29 10*3/mm3      RBC 3.67 10*6/mm3      Hemoglobin 10.6 g/dL      Hematocrit 31.2 %      MCV 85.0 fL      MCH 28.9 pg      MCHC 34.0 g/dL      RDW 13.3 %      RDW-SD 41.7 fl       MPV 11.3 fL      Platelets 180 10*3/mm3           Radiology Review:  Imaging Results (Last 72 Hours)     Procedure Component Value Units Date/Time    XR Chest 1 View [[id number]] Collected: 12/19/20 1406     Updated: 12/19/20 1410    Narrative:      Frontal upright radiograph of the chest 12/19/2020 1:39 PM CST     COMPARISON: May 29, 2019.     HISTORY: Flank pain.     FINDINGS:   The lungs are clear. The cardiomediastinal silhouette and pulmonary  vascularity are within normal limits.      The osseous structures and surrounding soft tissues demonstrate no acute  abnormality.       Impression:      1. No radiographic evidence of acute cardiopulmonary process.        This report was finalized on 12/19/2020 14:06 by Dr. Tal Cook MD.    CT Abdomen Pelvis Stone Protocol [[id number]] Collected: 12/19/20 1253     Updated: 12/19/20 1259    Narrative:      EXAMINATION:   CT ABDOMEN PELVIS STONE PROTOCOL-  12/19/2020 12:53 PM  CST     HISTORY: Flank pain CT ABDOMEN PELVIS STONE PROTOCOL- 12/19/2020 12:34  PM CST     HISTORY: Flank pain, kidney stone suspected      COMPARISON: None      DOSE LENGTH PRODUCT: 734 mGy cm. Automated exposure control was also  utilized to decrease patient radiation dose.     TECHNIQUE: Noncontrast enhanced images of the abdomen and pelvis  obtained without oral contrast. Multiplanar reformatted images were  provided for review.      FINDINGS:   The lung bases and base of the heart are unremarkable.      LIVER: No focal liver lesion.      BILIARY SYSTEM: The gallbladder is unremarkable. No intrahepatic or  extrahepatic ductal dilatation.      PANCREAS: No focal pancreatic lesion.      SPLEEN: Unremarkable.      KIDNEYS AND ADRENALS: Bilateral kidneys and adrenal glands are  unremarkable.  The ureters are decompressed and normal in appearance.     RETROPERITONEUM: No mass, lymphadenopathy or hemorrhage.      GI TRACT: No evidence of obstruction or bowel wall thickening. The  appendix is  visualized and unremarkable. Diverticulosis is noted     OTHER: There is no mesenteric mass, lymphadenopathy or fluid collection.  The osseous structures and soft tissues demonstrate no worrisome  lesions.     PELVIS: The uterus is visualized.. The urinary bladder is normal in  appearance.       Impression:      1. Diverticulosis. No acute intra-abdominal or pelvic abnormality is  identified.         This report was finalized on 12/19/2020 12:56 by Dr. Tal Cook MD.          Impression/Plan:  Patient Active Problem List   Diagnosis Code   • Colitis K52.9   • Flank pain R10.9     Left flank pain/nausea/vomiting/upper abd pain  Her primary complaints to me are that of upper abdominal pain associate with nausea and vomiting.  She tells me she has had H. pylori in the past that was treated.  She is actually eager to proceed with endoscopy to assess these complaints.  The left flank pain is in an atypical area for her GI complaints.  She has had a colonoscopy within the last 2 years and CT is unremarkable.  I do not feel we need to proceed with colonoscopy at this point.  She agrees and states that she would not be able to tolerate the prep at this point as well.    The risks, benefits, and alternatives of endoscopy were reviewed with the patient today.  Risks including perforation, with or without dilation, possibly requiring surgery.  Additional risks include risk of bleeding.  There is also the risk of a drug reaction or problems with anesthesia.  This will be discussed with the further by the anesthesia team on the day of the procedure. The benefits include the diagnosis and management of disease of the upper digestive tract.  Alternatives to endoscopy include upper GI series, laboratory testing, radiographic evaluation, or no intervention.  The patient verbalizes understanding and agrees.    In accordance with requirements under the Affordable Care Act, [facility] has provided pricing for all [facility]  services and items on each of its websites. However, a patient's actual cost may differ based on the services the patient receives to meet individual healthcare needs and based on the benefits provided under the patient’s insurance coverage.      IRINA Dickens  12/21/20   08:27 CST     Patient Seen, Chart, Consults, Notes, Labs, Radiology studies reviewed    I have seen and examined patient personally, performing a face-to-face diagnostic evaluation with plan of care reviewed and developed with APRN and nursing staff. I have addended and/or modified the above history of present illness, physical examination, and assessment and plan to reflect my findings and impressions. Essential elements of the care plan were discussed with APRN above.  Agree with findings and assessment/plan as documented above    Mariela Morales MD  [facility]  12/21/20  16:10 CST    Much of this encounter note is an electronic transcription/translation of spoken language to printed text. The electronic translation of spoken language may permit erroneous, or at times, nonsensical words or phrases to be inadvertently transcribed; although I have reviewed the note for such errors, some may still exist.

## 2020-12-21 NOTE — PLAN OF CARE
Goal Outcome Evaluation:  Plan of Care Reviewed With: patient  Progress: no change  Outcome Summary: IVF IV ABX; Zofran/Phen for nausea; Medicated for pain x3; resting between, care will cont to monitor; Temp 101.4 medicated with toradol

## 2020-12-21 NOTE — PROGRESS NOTES
Nutrition Services    Patient Name:  Gale Stone  YOB: 1970  MRN: [id number]  Admit Date:  12/19/2020    Pt has been NPO x 3 days. If unable to initiate po diet soon, she may benefit from alternate means of nutrition support. Will follow for diet advancement.     Electronically signed by:  Karen Singh RDN, LENORA  12/21/20 08:05 CST

## 2020-12-21 NOTE — PROGRESS NOTES
"CC:\"im feeling better but still nauseated\"--appreciate dr murray note--still complaining of left flank pain    Review of Systems   Constitutional: Positive for activity change, chills and fever.   HENT: Negative.    Eyes: Negative.    Respiratory: Negative.    Cardiovascular: Negative.    Gastrointestinal: Positive for abdominal pain.   Endocrine: Negative.    Genitourinary: Positive for flank pain.   Skin: Negative.    Allergic/Immunologic: Negative.    Neurological: Negative.    Hematological: Negative.    Psychiatric/Behavioral: Negative.      Temp:  [98.3 °F (36.8 °C)-101.5 °F (38.6 °C)] 101.5 °F (38.6 °C)  Heart Rate:  [65-86] 85  Resp:  [18] 18  BP: ()/(52-74) 133/65  No intake/output data recorded.  I/O this shift:  In: 2985 [I.V.:2935; IV Piggyback:50]  Out: 300 [Urine:300]    Physical Exam  HENT:      Head: Normocephalic and atraumatic.      Right Ear: Ear canal normal.      Left Ear: Ear canal normal.      Mouth/Throat:      Mouth: Mucous membranes are dry.      Pharynx: Oropharynx is clear.   Eyes:      Extraocular Movements: Extraocular movements intact.      Conjunctiva/sclera: Conjunctivae normal.      Pupils: Pupils are equal, round, and reactive to light.   Neck:      Musculoskeletal: Normal range of motion and neck supple.   Cardiovascular:      Rate and Rhythm: Normal rate and regular rhythm.      Pulses: Normal pulses.      Heart sounds: Normal heart sounds.   Pulmonary:      Effort: Pulmonary effort is normal.      Breath sounds: Normal breath sounds.   Abdominal:      General: Abdomen is flat. Bowel sounds are normal.      Palpations: Abdomen is soft.   Musculoskeletal: Normal range of motion.   Skin:     General: Skin is warm and dry.      Capillary Refill: Capillary refill takes less than 2 seconds.   Neurological:      General: No focal deficit present.      Mental Status: She is alert and oriented to person, place, and time. Mental status is at baseline.   Psychiatric:         Mood and " Affect: Mood normal.         Behavior: Behavior normal.         Thought Content: Thought content normal.         Judgment: Judgment normal.           Flank pain    She is feeling better, her sepsis markers improved--agree with urology---will continue with antbx, ask gi and surgery to see

## 2020-12-21 NOTE — PLAN OF CARE
Goal Outcome Evaluation:  Plan of Care Reviewed With: patient  Progress: no change  Outcome Summary: patient continue to complain of abdominal pain and nausea.  no emesis today.  IV zofran and dilaudid given.  patient remains NPO.  tenderness to left abdomen and flank.  continue to monitor

## 2020-12-21 NOTE — CONSULTS
"        [facility]  Inpatient Consult Note  Today's date:  12/21/20    Gale Stone  1970       Referring Provider: Devonte Espitia MD  Primary Physician: Yoko Smith APRN   Primary Gastroenterologist: Dr. Maurice Moss    Date of Admission: 12/19/2020  Date of Service:  12/21/20    Reason for Consultation/Chief Complaint: Fever, mid abd and flank pain    History of present illness: This is a very pleasant 50-year-old female who tells me that \"I had Covid on 23 November and have not felt well since that time.\"  The patient states that she has been having nausea and vomiting for 1 week along with fever.  She states she also notes left flank pain. The patient was initially seen by Dr. Devonte Espitia 12/19/2020 with complaints of left flank pain for several days, nausea, vomiting and fever of 101. She notes \"abdominal pain from the belly button up\". The patient denies any dysphagia, pyrosis or hematemesis.  The patient denies any chills.  Denies any melena or hematochezia.  Denies any unintentional weight loss or loss of appetite.  Last endo was \"years ago\"--none on file from our office.  She states she had H pylori and was treated at [facility].      The patient's last colonoscopy was performed on 1/9/2018 per Dr. Maurice Moss for acute infectious colitis with norovirus.  Impression resolved colitis small diminutive polyp removed.    Labs and imaging : CMP today reveals hyperglycemia, hypokalemia, hypocalcemia, CRP 13.8, CBC with a WBC of 11.2 with left shift.  Hemoglobin 10.6    Study Result    EXAMINATION:   CT ABDOMEN PELVIS STONE PROTOCOL-  12/19/2020 12:53 PM  CST     HISTORY: Flank pain CT ABDOMEN PELVIS STONE PROTOCOL- 12/19/2020 12:34  PM CST     HISTORY: Flank pain, kidney stone suspected      COMPARISON: None       IMPRESSION:  1. Diverticulosis. No acute intra-abdominal or pelvic abnormality is  identified.         This report was finalized on 12/19/2020 12:56 by Dr." Tal Cook MD.      Past Medical History:   Diagnosis Date   • Disease of thyroid gland    • Hypertension        Past Surgical History:   Procedure Laterality Date   • TUBAL ABDOMINAL LIGATION          Allergies   Allergen Reactions   • Other Palpitations     Patient reports allergy to IV steriods       Medications Prior to Admission   Medication Sig Dispense Refill Last Dose   • albuterol (PROVENTIL HFA;VENTOLIN HFA) 108 (90 Base) MCG/ACT inhaler Inhale 2 puffs Every 4 (Four) Hours As Needed for Wheezing.      • amitriptyline (ELAVIL) 50 MG tablet Take 50 mg by mouth Every Night.      • aspirin 325 MG tablet Take 325 mg by mouth Daily.      • CloNIDine (CATAPRES) 0.3 MG tablet Take 0.3 mg by mouth 3 (Three) Times a Day.      • cyclobenzaprine (FLEXERIL) 10 MG tablet Take 10 mg by mouth 3 (Three) Times a Day As Needed for Muscle Spasms.      • diltiaZEM (CARDIZEM) 60 MG tablet Take 60 mg by mouth 2 (Two) Times a Day.      • ketorolac (TORADOL) 10 MG tablet Take 1 tablet by mouth Every 6 (Six) Hours As Needed for Moderate Pain . 12 tablet 0    • latanoprost (XALATAN) 0.005 % ophthalmic solution 1 drop Every Night.      • levothyroxine (SYNTHROID, LEVOTHROID) 125 MCG tablet Take 125 mcg by mouth Daily.      • Losartan Potassium (COZAAR PO) Take  by mouth.      • metoprolol tartrate (LOPRESSOR) 100 MG tablet Take 100 mg by mouth 2 (Two) Times a Day.      • nitroglycerin (NITROSTAT) 0.4 MG SL tablet Place 0.4 mg under the tongue Every 5 (Five) Minutes As Needed for Chest Pain. Take no more than 3 doses in 15 minutes.      • polyethylene glycol-electrolytes (NULYTELY WITH FLAVOR PACKS) 420 g solution Take 4,000 mL by mouth See Admin Instructions. 4000 mL 0    • ZOFRAN 8 MG tablet Take 1 tablet by mouth Every 8 (Eight) Hours As Needed for Nausea or Vomiting for up to 10 doses. 10 tablet 1        Hospital Medications (active)       Dose Frequency Start End    albuterol (PROVENTIL) nebulizer solution 0.083% 2.5 mg/3mL  "2.5 mg Every 6 Hours PRN 12/19/2020     Route: Nebulization    amitriptyline (ELAVIL) tablet 50 mg 50 mg Nightly 12/19/2020     Route: Oral    aspirin tablet 325 mg 325 mg Daily 12/19/2020     Admin Instructions: Do not exceed 4 grams of aspirin in a 24 hr period.<BR><BR>If given for pain, use the following pain scale: <BR>Mild Pain = Pain Score of 1-3, CPOT 1-2<BR>Moderate Pain = Pain Score of 4-6, CPOT 3-4<BR>Severe Pain = Pain Score of 7-10, CPOT 5-8    Route: Oral    cloNIDine (CATAPRES) tablet 0.3 mg 0.3 mg 3 Times Daily 12/19/2020     Admin Instructions: Caution: Look alike/sound alike drug alert. Please read the label.<BR>Caution: Look alike/sound alike drug alert.    Route: Oral    dextrose 5 % and sodium chloride 0.45 % with KCl 20 mEq/L infusion 125 mL/hr Continuous 12/19/2020     Route: Intravenous    dilTIAZem (CARDIZEM) tablet 60 mg 60 mg Every 8 Hours Scheduled 12/19/2020     Admin Instructions: Caution: Look alike/sound alike drug alert.  Take on empty stomach 1 hr before or 2 hrs after meals. Avoid grapefruit juice. Maximum simvastatin dose 10 mg    Route: Oral    HYDROmorphone (DILAUDID) injection 0.5 mg 0.5 mg Every 2 Hours PRN 12/19/2020 12/26/2020    Admin Instructions: If given for pain, use the following pain scale:<BR>Mild Pain = Pain Score of 1-3, CPOT 1-2<BR>Moderate Pain = Pain Score of 4-6, CPOT 3-4<BR>Severe Pain = Pain Score of 7-10, CPOT 5-8    Route: Intravenous    Linked Group 1: \"And\" Linked Group Details        latanoprost (XALATAN) 0.005 % ophthalmic solution 1 drop 1 drop Nightly 12/19/2020     Route: Both Eyes    levothyroxine (SYNTHROID, LEVOTHROID) tablet 125 mcg 125 mcg Every Early Morning 12/20/2020     Admin Instructions: Take on empty stomach.    Route: Oral    losartan (COZAAR) tablet 50 mg 50 mg Every 24 Hours Scheduled 12/19/2020     Route: Oral    metoprolol tartrate (LOPRESSOR) tablet 100 mg 100 mg Every 12 Hours Scheduled 12/19/2020     Route: Oral    metroNIDAZOLE " "(FLAGYL) 500 mg/100mL IVPB 500 mg Every 8 Hours 12/20/2020 12/27/2020    Admin Instructions: Caution: Look alike/sound alike drug alert.  Do not refrigerate.    Route: Intravenous    naloxone (NARCAN) injection 0.4 mg 0.4 mg Every 5 Minutes PRN 12/19/2020     Admin Instructions: If Respiratory Rate Less Than 8 or Patient is Difficult to Arouse, Stop ALL Narcotics & Contact Provider.<BR>Administer Slow IV Push.  Repeat As Ordered Until Respiratory Rate is Greater Than 12.    Route: Intravenous    Linked Group 1: \"And\" Linked Group Details        nitroglycerin (NITROSTAT) SL tablet 0.4 mg 0.4 mg Every 5 Minutes PRN 12/19/2020     Admin Instructions: May administer up to 3 doses per episode.    Route: Sublingual    ondansetron (ZOFRAN) injection 4 mg 4 mg Every 6 Hours PRN 12/19/2020     Admin Instructions: If BOTH ondansetron (ZOFRAN) and promethazine (PHENERGAN) are ordered use ondansetron first and THEN promethazine IF ondansetron is ineffective.    Route: Intravenous    Linked Group 2: \"Or\" Linked Group Details        ondansetron (ZOFRAN) tablet 4 mg 4 mg Every 6 Hours PRN 12/19/2020     Admin Instructions: If BOTH ondansetron (ZOFRAN) and promethazine (PHENERGAN) are ordered use ondansetron first and THEN promethazine IF ondansetron is ineffective.    Route: Oral    Linked Group 2: \"Or\" Linked Group Details        piperacillin-tazobactam (ZOSYN) 3.375 g in iso-osmotic dextrose 50 ml (premix) 3.375 g Every 8 Hours 12/19/2020 12/26/2020    Admin Instructions: Refrigerate    Route: Intravenous    promethazine (PHENERGAN) 25 mg in sodium chloride 0.9 % 50 mL 25 mg Every 6 Hours PRN 12/20/2020     Admin Instructions: Must dilute in 50 mL NS.Administer via large-bore vein (not hand or wrist).    Route: Intravenous    sodium chloride 0.9 % flush 10 mL 10 mL Every 12 Hours Scheduled 12/19/2020     Route: Intravenous    sodium chloride 0.9 % flush 10 mL 10 mL As Needed 12/19/2020     Route: Intravenous          Social " History     Tobacco Use   • Smoking status: Never Smoker   Substance Use Topics   • Alcohol use: No        Past Family History:  No family history on file.    Review of Systems:  Review of Systems   Constitutional: Negative for fever and unexpected weight change.   HENT: Negative for hearing loss.    Eyes: Negative for visual disturbance.   Respiratory: Negative for cough.    Cardiovascular: Negative for chest pain.   Gastrointestinal:        See HPI   Endocrine: Negative for cold intolerance and heat intolerance.   Genitourinary: Negative for dysuria.   Musculoskeletal: Negative for arthralgias.   Skin: Negative for rash.   Neurological: Negative for seizures.   Psychiatric/Behavioral: Negative for hallucinations.       Physical Exam:  Temp:  [98.6 °F (37 °C)-101.2 °F (38.4 °C)] 98.9 °F (37.2 °C)  Heart Rate:  [70-87] 72  Resp:  [18-20] 20  BP: (116-156)/(64-85) 156/84  There is no height or weight on file to calculate BMI.    Intake/Output Summary (Last 24 hours) at 12/21/2020 1610  Last data filed at 12/21/2020 1420  Gross per 24 hour   Intake 3296 ml   Output --   Net 3296 ml     I/O this shift:  In: 1286 [I.V.:1286]  Out: -   Physical Exam  Vitals signs reviewed.   Constitutional:       Appearance: She is well-developed.   Cardiovascular:      Rate and Rhythm: Normal rate and regular rhythm.      Heart sounds: Normal heart sounds. No murmur. No friction rub. No gallop.    Pulmonary:      Effort: Pulmonary effort is normal.      Breath sounds: Normal breath sounds. No wheezing or rales.   Abdominal:      General: Bowel sounds are normal. There is no distension.      Palpations: Abdomen is soft. Abdomen is not rigid.      Tenderness: There is abdominal tenderness (Left flank pain radiating around to left lower quadrant). There is no guarding or rebound.   Musculoskeletal: Normal range of motion.         General: No tenderness or deformity.   Skin:     General: Skin is warm and dry.      Findings: No rash.    Neurological:      Mental Status: She is alert and oriented to person, place, and time.   Psychiatric:         Behavior: Behavior normal.         Thought Content: Thought content normal.         Judgment: Judgment normal.         Results Review:  Lab Results (last 24 hours)     Procedure Component Value Units Date/Time    Blood Culture - Blood, Arm, Left [[id number]] Collected: 12/19/20 1509    Specimen: Blood from Arm, Left Updated: 12/21/20 1530     Blood Culture No growth at 2 days    Urine Culture - Urine, Urine, Clean Catch [[id number]]  (Normal) Collected: 12/20/20 1102    Specimen: Urine, Clean Catch Updated: 12/21/20 0840     Urine Culture No growth    Sedimentation Rate [[id number]]  (Abnormal) Collected: 12/21/20 0547    Specimen: Blood Updated: 12/21/20 0705     Sed Rate 61 mm/hr     C-reactive Protein [[id number]]  (Abnormal) Collected: 12/21/20 0547    Specimen: Blood Updated: 12/21/20 0642     C-Reactive Protein 13.82 mg/dL     Comprehensive Metabolic Panel [[id number]]  (Abnormal) Collected: 12/21/20 0547    Specimen: Blood Updated: 12/21/20 0641     Glucose 118 mg/dL      BUN 6 mg/dL      Creatinine 0.71 mg/dL      Sodium 136 mmol/L      Potassium 3.1 mmol/L      Chloride 103 mmol/L      CO2 25.0 mmol/L      Calcium 8.1 mg/dL      Total Protein 6.3 g/dL      Albumin 3.10 g/dL      ALT (SGPT) 12 U/L      AST (SGOT) 15 U/L      Alkaline Phosphatase 69 U/L      Total Bilirubin 0.5 mg/dL      eGFR Non African Amer 86 mL/min/1.73      Globulin 3.2 gm/dL      A/G Ratio 1.0 g/dL      BUN/Creatinine Ratio 8.5     Anion Gap 8.0 mmol/L     Narrative:      GFR Normal >60  Chronic Kidney Disease <60  Kidney Failure <15      CBC (No Diff) [[id number]]  (Abnormal) Collected: 12/21/20 0547    Specimen: Blood Updated: 12/21/20 0617     WBC 11.29 10*3/mm3      RBC 3.67 10*6/mm3      Hemoglobin 10.6 g/dL      Hematocrit 31.2 %      MCV 85.0 fL      MCH 28.9 pg      MCHC 34.0 g/dL      RDW 13.3 %      RDW-SD 41.7 fl       MPV 11.3 fL      Platelets 180 10*3/mm3           Radiology Review:  Imaging Results (Last 72 Hours)     Procedure Component Value Units Date/Time    XR Chest 1 View [[id number]] Collected: 12/19/20 1406     Updated: 12/19/20 1410    Narrative:      Frontal upright radiograph of the chest 12/19/2020 1:39 PM CST     COMPARISON: May 29, 2019.     HISTORY: Flank pain.     FINDINGS:   The lungs are clear. The cardiomediastinal silhouette and pulmonary  vascularity are within normal limits.      The osseous structures and surrounding soft tissues demonstrate no acute  abnormality.       Impression:      1. No radiographic evidence of acute cardiopulmonary process.        This report was finalized on 12/19/2020 14:06 by Dr. Tal Cook MD.    CT Abdomen Pelvis Stone Protocol [[id number]] Collected: 12/19/20 1253     Updated: 12/19/20 1259    Narrative:      EXAMINATION:   CT ABDOMEN PELVIS STONE PROTOCOL-  12/19/2020 12:53 PM  CST     HISTORY: Flank pain CT ABDOMEN PELVIS STONE PROTOCOL- 12/19/2020 12:34  PM CST     HISTORY: Flank pain, kidney stone suspected      COMPARISON: None      DOSE LENGTH PRODUCT: 734 mGy cm. Automated exposure control was also  utilized to decrease patient radiation dose.     TECHNIQUE: Noncontrast enhanced images of the abdomen and pelvis  obtained without oral contrast. Multiplanar reformatted images were  provided for review.      FINDINGS:   The lung bases and base of the heart are unremarkable.      LIVER: No focal liver lesion.      BILIARY SYSTEM: The gallbladder is unremarkable. No intrahepatic or  extrahepatic ductal dilatation.      PANCREAS: No focal pancreatic lesion.      SPLEEN: Unremarkable.      KIDNEYS AND ADRENALS: Bilateral kidneys and adrenal glands are  unremarkable.  The ureters are decompressed and normal in appearance.     RETROPERITONEUM: No mass, lymphadenopathy or hemorrhage.      GI TRACT: No evidence of obstruction or bowel wall thickening. The  appendix is  visualized and unremarkable. Diverticulosis is noted     OTHER: There is no mesenteric mass, lymphadenopathy or fluid collection.  The osseous structures and soft tissues demonstrate no worrisome  lesions.     PELVIS: The uterus is visualized.. The urinary bladder is normal in  appearance.       Impression:      1. Diverticulosis. No acute intra-abdominal or pelvic abnormality is  identified.         This report was finalized on 12/19/2020 12:56 by Dr. Tal Cook MD.          Impression/Plan:  Patient Active Problem List   Diagnosis Code   • Colitis K52.9   • Flank pain R10.9     Left flank pain/nausea/vomiting/upper abd pain  Her primary complaints to me are that of upper abdominal pain associate with nausea and vomiting.  She tells me she has had H. pylori in the past that was treated.  She is actually eager to proceed with endoscopy to assess these complaints.  The left flank pain is in an atypical area for her GI complaints.  She has had a colonoscopy within the last 2 years and CT is unremarkable.  I do not feel we need to proceed with colonoscopy at this point.  She agrees and states that she would not be able to tolerate the prep at this point as well.    The risks, benefits, and alternatives of endoscopy were reviewed with the patient today.  Risks including perforation, with or without dilation, possibly requiring surgery.  Additional risks include risk of bleeding.  There is also the risk of a drug reaction or problems with anesthesia.  This will be discussed with the further by the anesthesia team on the day of the procedure. The benefits include the diagnosis and management of disease of the upper digestive tract.  Alternatives to endoscopy include upper GI series, laboratory testing, radiographic evaluation, or no intervention.  The patient verbalizes understanding and agrees.    In accordance with requirements under the Affordable Care Act, [facility] has provided pricing for all [facility]  services and items on each of its websites. However, a patient's actual cost may differ based on the services the patient receives to meet individual healthcare needs and based on the benefits provided under the patient’s insurance coverage.      IRINA Dickens  12/21/20   08:27 CST     Patient Seen, Chart, Consults, Notes, Labs, Radiology studies reviewed    I have seen and examined patient personally, performing a face-to-face diagnostic evaluation with plan of care reviewed and developed with APRN and nursing staff. I have addended and/or modified the above history of present illness, physical examination, and assessment and plan to reflect my findings and impressions. Essential elements of the care plan were discussed with APRN above.  Agree with findings and assessment/plan as documented above    Mariela Morales MD  [facility]  12/21/20  16:10 CST    Much of this encounter note is an electronic transcription/translation of spoken language to printed text. The electronic translation of spoken language may permit erroneous, or at times, nonsensical words or phrases to be inadvertently transcribed; although I have reviewed the note for such errors, some may still exist.

## 2020-12-21 NOTE — CONSULTS
Keke Gray MD Consult Note      Patient Care Team:  Yoko Smith APRN as PCP - General (Nurse Practitioner)    Chief complaint abdominal pain    Subjective .    History of present illness: 48-year-old female who is had left-sided abdominal pain and intermittent fevers for about a month.  She had Covid around Thanksgiving and has had trouble since.  She been having diarrhea has not had a bowel movement since admission.  She was seen in the ER over at [place] and was felt to of kidney stones was sent here for repeat CT scan here and evaluation by urologist revealed no concern for stones.  Pain is mostly in the left flank eating around to left lower quadrant    Review of Systems  Pertinent items are noted in HPI, all other systems reviewed and negative    History  Past Medical History:   Diagnosis Date   • Disease of thyroid gland    • Hypertension    ,   Past Surgical History:   Procedure Laterality Date   • TUBAL ABDOMINAL LIGATION     , No family history on file.,   Social History     Tobacco Use   • Smoking status: Never Smoker   Substance Use Topics   • Alcohol use: No   • Drug use: No   ,   Medications Prior to Admission   Medication Sig Dispense Refill Last Dose   • albuterol (PROVENTIL HFA;VENTOLIN HFA) 108 (90 Base) MCG/ACT inhaler Inhale 2 puffs Every 4 (Four) Hours As Needed for Wheezing.      • amitriptyline (ELAVIL) 50 MG tablet Take 50 mg by mouth Every Night.      • aspirin 325 MG tablet Take 325 mg by mouth Daily.      • CloNIDine (CATAPRES) 0.3 MG tablet Take 0.3 mg by mouth 3 (Three) Times a Day.      • cyclobenzaprine (FLEXERIL) 10 MG tablet Take 10 mg by mouth 3 (Three) Times a Day As Needed for Muscle Spasms.      • diltiaZEM (CARDIZEM) 60 MG tablet Take 60 mg by mouth 2 (Two) Times a Day.      • ketorolac (TORADOL) 10 MG tablet Take 1 tablet by mouth Every 6 (Six) Hours As Needed for Moderate Pain . 12 tablet 0    • latanoprost (XALATAN) 0.005 % ophthalmic solution 1 drop Every Night.      •  levothyroxine (SYNTHROID, LEVOTHROID) 125 MCG tablet Take 125 mcg by mouth Daily.      • Losartan Potassium (COZAAR PO) Take  by mouth.      • metoprolol tartrate (LOPRESSOR) 100 MG tablet Take 100 mg by mouth 2 (Two) Times a Day.      • nitroglycerin (NITROSTAT) 0.4 MG SL tablet Place 0.4 mg under the tongue Every 5 (Five) Minutes As Needed for Chest Pain. Take no more than 3 doses in 15 minutes.      • polyethylene glycol-electrolytes (NULYTELY WITH FLAVOR PACKS) 420 g solution Take 4,000 mL by mouth See Admin Instructions. 4000 mL 0    • ZOFRAN 8 MG tablet Take 1 tablet by mouth Every 8 (Eight) Hours As Needed for Nausea or Vomiting for up to 10 doses. 10 tablet 1    , Scheduled Meds:  amitriptyline, 50 mg, Oral, Nightly  aspirin, 325 mg, Oral, Daily  cloNIDine, 0.3 mg, Oral, TID  dilTIAZem, 60 mg, Oral, Q8H  latanoprost, 1 drop, Both Eyes, Nightly  levothyroxine, 125 mcg, Oral, Q AM  losartan, 50 mg, Oral, Q24H  metoprolol tartrate, 100 mg, Oral, Q12H  metroNIDAZOLE, 500 mg, Intravenous, Q8H  piperacillin-tazobactam, 3.375 g, Intravenous, Q8H  sodium chloride, 10 mL, Intravenous, Q12H    , Continuous Infusions:  dextrose 5 % and sodium chloride 0.45 % with KCl 20 mEq/L, 125 mL/hr, Last Rate: 125 mL/hr (12/21/20 0551)    , PRN Meds:  •  albuterol  •  HYDROmorphone **AND** naloxone  •  nitroglycerin  •  ondansetron **OR** ondansetron  •  promethazine  •  sodium chloride and Allergies:  Other    Objective     Vital Signs   Temp:  [98.6 °F (37 °C)-101.5 °F (38.6 °C)] 98.9 °F (37.2 °C)  Heart Rate:  [70-87] 70  Resp:  [18-20] 20  BP: (116-139)/(64-85) 116/69    Intake & Output (last 3 days)       12/18 0701 - 12/19 0700 12/19 0701 - 12/20 0700 12/20 0701 - 12/21 0700 12/21 0701 - 12/22 0700    I.V.   3991     IV Piggyback   50     Total Intake   4041     Urine   300     Total Output   300     Net   +3741             Urine Unmeasured Occurrence  1 x             Physical Exam:     General Appearance:    Alert,  cooperative, in no acute distress   Head:    Normocephalic, without obvious abnormality, atraumatic   Eyes:            Lids and lashes normal, conjunctivae and sclerae normal, no   icterus, no pallor, corneas clear, PERRLA   Ears:    Ears appear intact with no abnormalities noted   Neck:   No adenopathy, supple, trachea midline   Back:     No kyphosis present, no scoliosis present, no skin lesions,      erythema or scars, no tenderness to percussion or                   palpation,  range of motion normal   Lungs:     Clear to auscultation,respirations regular, even and                  unlabored    Heart:    Regular rhythm and normal rate, normal S1 and S2, no            murmur, no gallop, no rub, no click   Chest Wall:    No abnormalities observed   Abdomen:    Obese normal bowel sounds she has tenderness left flank radiating to the left lower quadrant   Rectal:     Deferred   Extremities:   Moves all extremities well, no edema, no cyanosis, no             redness   Pulses:   Pulses palpable and equal bilaterally   Skin:   No bleeding, bruising or rash   Lymph nodes:   No palpable adenopathy   Neurologic:   No focal deficits       Lab Results (last 72 hours)     Procedure Component Value Units Date/Time    Urine Culture - Urine, Urine, Clean Catch [[id number]]  (Normal) Collected: 12/20/20 1102    Specimen: Urine, Clean Catch Updated: 12/21/20 0840     Urine Culture No growth    Sedimentation Rate [[id number]]  (Abnormal) Collected: 12/21/20 0547    Specimen: Blood Updated: 12/21/20 0705     Sed Rate 61 mm/hr     C-reactive Protein [[id number]]  (Abnormal) Collected: 12/21/20 0547    Specimen: Blood Updated: 12/21/20 0642     C-Reactive Protein 13.82 mg/dL     Comprehensive Metabolic Panel [[id number]]  (Abnormal) Collected: 12/21/20 0547    Specimen: Blood Updated: 12/21/20 0641     Glucose 118 mg/dL      BUN 6 mg/dL      Creatinine 0.71 mg/dL      Sodium 136 mmol/L      Potassium 3.1 mmol/L      Chloride 103 mmol/L        CO2 25.0 mmol/L      Calcium 8.1 mg/dL      Total Protein 6.3 g/dL      Albumin 3.10 g/dL      ALT (SGPT) 12 U/L      AST (SGOT) 15 U/L      Alkaline Phosphatase 69 U/L      Total Bilirubin 0.5 mg/dL      eGFR Non African Amer 86 mL/min/1.73      Globulin 3.2 gm/dL      A/G Ratio 1.0 g/dL      BUN/Creatinine Ratio 8.5     Anion Gap 8.0 mmol/L     Narrative:      GFR Normal >60  Chronic Kidney Disease <60  Kidney Failure <15      CBC (No Diff) [[id number]]  (Abnormal) Collected: 12/21/20 0547    Specimen: Blood Updated: 12/21/20 0617     WBC 11.29 10*3/mm3      RBC 3.67 10*6/mm3      Hemoglobin 10.6 g/dL      Hematocrit 31.2 %      MCV 85.0 fL      MCH 28.9 pg      MCHC 34.0 g/dL      RDW 13.3 %      RDW-SD 41.7 fl      MPV 11.3 fL      Platelets 180 10*3/mm3     Blood Culture - Blood, Arm, Left [[id number]] Collected: 12/19/20 1509    Specimen: Blood from Arm, Left Updated: 12/20/20 1530     Blood Culture No growth at 24 hours    Lactic Acid, Plasma [[id number]]  (Normal) Collected: 12/20/20 1156    Specimen: Blood Updated: 12/20/20 1224     Lactate 1.4 mmol/L     Urinalysis With Culture If Indicated - Urine, Clean Catch [[id number]]  (Abnormal) Collected: 12/20/20 1102    Specimen: Urine, Clean Catch Updated: 12/20/20 1117     Color, UA Yellow     Appearance, UA Clear     pH, UA 6.0     Specific Gravity, UA 1.016     Glucose, UA Negative     Ketones, UA Negative     Bilirubin, UA Negative     Blood, UA Trace     Protein, UA Negative     Leuk Esterase, UA Moderate (2+)     Nitrite, UA Negative     Urobilinogen, UA 0.2 E.U./dL    Urinalysis, Microscopic Only - Urine, Clean Catch [[id number]]  (Abnormal) Collected: 12/20/20 1102    Specimen: Urine, Clean Catch Updated: 12/20/20 1117     RBC, UA 6-12 /HPF      WBC, UA 31-50 /HPF      Bacteria, UA None Seen /HPF      Squamous Epithelial Cells, UA 3-6 /HPF      Hyaline Casts, UA 0-2 /LPF      Methodology Automated Microscopy    Comprehensive Metabolic Panel [[id number]]   (Abnormal) Collected: 12/20/20 0626    Specimen: Blood Updated: 12/20/20 0657     Glucose 123 mg/dL      BUN 10 mg/dL      Creatinine 0.98 mg/dL      Sodium 135 mmol/L      Potassium 3.2 mmol/L      Chloride 100 mmol/L      CO2 25.0 mmol/L      Calcium 8.2 mg/dL      Total Protein 6.4 g/dL      Albumin 3.40 g/dL      ALT (SGPT) 13 U/L      AST (SGOT) 14 U/L      Alkaline Phosphatase 68 U/L      Total Bilirubin 0.5 mg/dL      eGFR Non African Amer 60 mL/min/1.73      Globulin 3.0 gm/dL      A/G Ratio 1.1 g/dL      BUN/Creatinine Ratio 10.2     Anion Gap 10.0 mmol/L     Narrative:      GFR Normal >60  Chronic Kidney Disease <60  Kidney Failure <15      Lactic Acid, Plasma [[id number]]  (Normal) Collected: 12/20/20 0626    Specimen: Blood Updated: 12/20/20 0653     Lactate 1.6 mmol/L     CBC & Differential [[id number]]  (Abnormal) Collected: 12/20/20 0626    Specimen: Blood Updated: 12/20/20 0639    Narrative:      The following orders were created for panel order CBC & Differential.  Procedure                               Abnormality         Status                     ---------                               -----------         ------                     CBC Auto Differential[[id number]]        Abnormal            Final result                 Please view results for these tests on the individual orders.    CBC Auto Differential [[id number]]  (Abnormal) Collected: 12/20/20 0626    Specimen: Blood Updated: 12/20/20 0639     WBC 14.60 10*3/mm3      RBC 3.89 10*6/mm3      Hemoglobin 11.3 g/dL      Hematocrit 33.5 %      MCV 86.1 fL      MCH 29.0 pg      MCHC 33.7 g/dL      RDW 13.8 %      RDW-SD 43.0 fl      MPV 10.8 fL      Platelets 207 10*3/mm3      Neutrophil % 74.2 %      Lymphocyte % 17.7 %      Monocyte % 5.0 %      Eosinophil % 2.2 %      Basophil % 0.3 %      Immature Grans % 0.6 %      Neutrophils, Absolute 10.83 10*3/mm3      Lymphocytes, Absolute 2.59 10*3/mm3      Monocytes, Absolute 0.73 10*3/mm3       "Eosinophils, Absolute 0.32 10*3/mm3      Basophils, Absolute 0.04 10*3/mm3      Immature Grans, Absolute 0.09 10*3/mm3      nRBC 0.0 /100 WBC     Lactic Acid, Plasma [[id number]]  (Normal) Collected: 12/19/20 2317    Specimen: Blood Updated: 12/19/20 2345     Lactate 1.3 mmol/L     Urinalysis With Microscopic If Indicated (No Culture) - Urine, Clean Catch [[id number]]  (Abnormal) Collected: 12/19/20 1711    Specimen: Urine, Clean Catch Updated: 12/19/20 1731     Color, UA Yellow     Appearance, UA Clear     pH, UA 5.5     Specific Gravity, UA 1.016     Glucose, UA Negative     Ketones, UA Trace     Bilirubin, UA Negative     Blood, UA Negative     Protein, UA Trace     Leuk Esterase, UA Small (1+)     Nitrite, UA Negative     Urobilinogen, UA 0.2 E.U./dL    Urinalysis, Microscopic Only - Urine, Clean Catch [[id number]]  (Abnormal) Collected: 12/19/20 1711    Specimen: Urine, Clean Catch Updated: 12/19/20 1731     RBC, UA 0-2 /HPF      WBC, UA 6-12 /HPF      Bacteria, UA None Seen /HPF      Squamous Epithelial Cells, UA 0-2 /HPF      Hyaline Casts, UA 0-2 /LPF      Methodology Automated Microscopy    Procalcitonin [[id number]]  (Abnormal) Collected: 12/19/20 1351    Specimen: Blood Updated: 12/19/20 1543     Procalcitonin 0.28 ng/mL     Narrative:      As a Marker for Sepsis (Non-Neonates):   1. <0.5 ng/mL represents a low risk of severe sepsis and/or septic shock.  1. >2 ng/mL represents a high risk of severe sepsis and/or septic shock.    As a Marker for Lower Respiratory Tract Infections that require antibiotic therapy:  PCT on Admission     Antibiotic Therapy             6-12 Hrs later  > 0.5                Strongly Recommended            >0.25 - <0.5         Recommended  0.1 - 0.25           Discouraged                   Remeasure/reassess PCT  <0.1                 Strongly Discouraged          Remeasure/reassess PCT      As 28 day mortality risk marker: \"Change in Procalcitonin Result\" (> 80 % or <=80 %) if Day 0 " (or Day 1) and Day 4 values are available. Refer to [email or web address]/   Change in PCT <=80 %   A decrease of PCT levels below or equal to 80 % defines a positive change in PCT test result representing a higher risk for 28-day all-cause mortality of patients diagnosed with severe sepsis or septic shock.  Change in PCT > 80 %   A decrease of PCT levels of more than 80 % defines a negative change in PCT result representing a lower risk for 28-day all-cause mortality of patients diagnosed with severe sepsis or septic shock.                Results may be falsely decreased if patient taking Biotin.     Lactic Acid, Plasma [[id number]]  (Normal) Collected: 12/19/20 1509    Specimen: Blood Updated: 12/19/20 1529     Lactate 1.3 mmol/L     Troponin [[id number]]  (Normal) Collected: 12/19/20 1351    Specimen: Blood Updated: 12/19/20 1431     Troponin T <0.010 ng/mL     Narrative:      Troponin T Reference Range:  <= 0.03 ng/mL-   Negative for AMI  >0.03 ng/mL-     Abnormal for myocardial necrosis.  Clinicians would have to utilize clinical acumen, EKG, Troponin and serial changes to determine if it is an Acute Myocardial Infarction or myocardial injury due to an underlying chronic condition.       Results may be falsely decreased if patient taking Biotin.      Comprehensive Metabolic Panel [[id number]]  (Abnormal) Collected: 12/19/20 1351    Specimen: Blood Updated: 12/19/20 1429     Glucose 127 mg/dL      BUN 11 mg/dL      Creatinine 0.89 mg/dL      Sodium 137 mmol/L      Potassium 3.2 mmol/L      Chloride 101 mmol/L      CO2 21.0 mmol/L      Calcium 9.0 mg/dL      Total Protein 7.1 g/dL      Albumin 4.10 g/dL      ALT (SGPT) 15 U/L      AST (SGOT) 16 U/L      Alkaline Phosphatase 83 U/L      Total Bilirubin 0.6 mg/dL      eGFR Non African Amer 67 mL/min/1.73      Globulin 3.0 gm/dL      A/G Ratio 1.4 g/dL      BUN/Creatinine Ratio 12.4     Anion Gap 15.0 mmol/L     Narrative:      GFR Normal >60  Chronic  Kidney Disease <60  Kidney Failure <15      Amylase [[id number]]  (Abnormal) Collected: 12/19/20 1351    Specimen: Blood Updated: 12/19/20 1426     Amylase 24 U/L     CBC Auto Differential [[id number]]  (Abnormal) Collected: 12/19/20 1351    Specimen: Blood Updated: 12/19/20 1413     WBC 18.38 10*3/mm3      RBC 4.15 10*6/mm3      Hemoglobin 12.4 g/dL      Hematocrit 34.8 %      MCV 83.9 fL      MCH 29.9 pg      MCHC 35.6 g/dL      RDW 13.7 %      RDW-SD 41.7 fl      MPV 11.2 fL      Platelets 225 10*3/mm3      Neutrophil % 84.6 %      Lymphocyte % 7.8 %      Monocyte % 6.4 %      Eosinophil % 0.1 %      Basophil % 0.2 %      Immature Grans % 0.9 %      Neutrophils, Absolute 15.56 10*3/mm3      Lymphocytes, Absolute 1.43 10*3/mm3      Monocytes, Absolute 1.18 10*3/mm3      Eosinophils, Absolute 0.02 10*3/mm3      Basophils, Absolute 0.03 10*3/mm3      Immature Grans, Absolute 0.16 10*3/mm3      nRBC 0.0 /100 WBC         Imaging Results (Last 72 Hours)     Procedure Component Value Units Date/Time    XR Chest 1 View [[id number]] Collected: 12/19/20 1406     Updated: 12/19/20 1410    Narrative:      Frontal upright radiograph of the chest 12/19/2020 1:39 PM CST     COMPARISON: May 29, 2019.     HISTORY: Flank pain.     FINDINGS:   The lungs are clear. The cardiomediastinal silhouette and pulmonary  vascularity are within normal limits.      The osseous structures and surrounding soft tissues demonstrate no acute  abnormality.       Impression:      1. No radiographic evidence of acute cardiopulmonary process.        This report was finalized on 12/19/2020 14:06 by Dr. Tal Cook MD.    CT Abdomen Pelvis Stone Protocol [[id number]] Collected: 12/19/20 1253     Updated: 12/19/20 1259    Narrative:      EXAMINATION:   CT ABDOMEN PELVIS STONE PROTOCOL-  12/19/2020 12:53 PM  CST     HISTORY: Flank pain CT ABDOMEN PELVIS STONE PROTOCOL- 12/19/2020 12:34  PM CST     HISTORY: Flank pain, kidney stone suspected      COMPARISON:  None      DOSE LENGTH PRODUCT: 734 mGy cm. Automated exposure control was also  utilized to decrease patient radiation dose.     TECHNIQUE: Noncontrast enhanced images of the abdomen and pelvis  obtained without oral contrast. Multiplanar reformatted images were  provided for review.      FINDINGS:   The lung bases and base of the heart are unremarkable.      LIVER: No focal liver lesion.      BILIARY SYSTEM: The gallbladder is unremarkable. No intrahepatic or  extrahepatic ductal dilatation.      PANCREAS: No focal pancreatic lesion.      SPLEEN: Unremarkable.      KIDNEYS AND ADRENALS: Bilateral kidneys and adrenal glands are  unremarkable.  The ureters are decompressed and normal in appearance.     RETROPERITONEUM: No mass, lymphadenopathy or hemorrhage.      GI TRACT: No evidence of obstruction or bowel wall thickening. The  appendix is visualized and unremarkable. Diverticulosis is noted     OTHER: There is no mesenteric mass, lymphadenopathy or fluid collection.  The osseous structures and soft tissues demonstrate no worrisome  lesions.     PELVIS: The uterus is visualized.. The urinary bladder is normal in  appearance.       Impression:      1. Diverticulosis. No acute intra-abdominal or pelvic abnormality is  identified.         This report was finalized on 12/19/2020 12:56 by Dr. Tal Cook MD.                Assessment/Plan       Flank pain      I reviewed her CT scan.  To my view there appears to be some maybe mild inflammatory changes around the left kidney report does not show any she has diverticulosis but no evidence of diverticulitis or any other intra-abdominal pathology that can be appreciated.  I am not sure the etiology of her pain.  Agree with GI evaluation and work-up.  She does have leukocytosis with elevated CRP      Keke Gray MD  12/21/20  11:18 CST

## 2020-12-22 ENCOUNTER — ANESTHESIA EVENT (OUTPATIENT)
Dept: GASTROENTEROLOGY | Facility: HOSPITAL | Age: 50
End: 2020-12-22

## 2020-12-22 ENCOUNTER — ANESTHESIA (OUTPATIENT)
Dept: GASTROENTEROLOGY | Facility: HOSPITAL | Age: 50
End: 2020-12-22

## 2020-12-22 PROBLEM — R10.10 PAIN OF UPPER ABDOMEN: Status: ACTIVE | Noted: 2020-12-19

## 2020-12-22 LAB — SARS-COV-2 RNA PNL SPEC NAA+PROBE: NOT DETECTED

## 2020-12-22 PROCEDURE — 94640 AIRWAY INHALATION TREATMENT: CPT

## 2020-12-22 PROCEDURE — 25010000002 ONDANSETRON PER 1 MG: Performed by: FAMILY MEDICINE

## 2020-12-22 PROCEDURE — 43239 EGD BIOPSY SINGLE/MULTIPLE: CPT | Performed by: INTERNAL MEDICINE

## 2020-12-22 PROCEDURE — 25010000002 HYDROMORPHONE PER 4 MG: Performed by: FAMILY MEDICINE

## 2020-12-22 PROCEDURE — 25010000002 PROPOFOL 10 MG/ML EMULSION: Performed by: NURSE ANESTHETIST, CERTIFIED REGISTERED

## 2020-12-22 PROCEDURE — 99231 SBSQ HOSP IP/OBS SF/LOW 25: CPT | Performed by: FAMILY MEDICINE

## 2020-12-22 PROCEDURE — 0DB78ZX EXCISION OF STOMACH, PYLORUS, VIA NATURAL OR ARTIFICIAL OPENING ENDOSCOPIC, DIAGNOSTIC: ICD-10-PCS | Performed by: FAMILY MEDICINE

## 2020-12-22 PROCEDURE — 87635 SARS-COV-2 COVID-19 AMP PRB: CPT | Performed by: FAMILY MEDICINE

## 2020-12-22 PROCEDURE — 94799 UNLISTED PULMONARY SVC/PX: CPT

## 2020-12-22 PROCEDURE — 25010000002 PIPERACILLIN SOD-TAZOBACTAM PER 1 G: Performed by: FAMILY MEDICINE

## 2020-12-22 PROCEDURE — 87081 CULTURE SCREEN ONLY: CPT | Performed by: INTERNAL MEDICINE

## 2020-12-22 RX ORDER — SODIUM CHLORIDE 9 MG/ML
100 INJECTION, SOLUTION INTRAVENOUS CONTINUOUS
Status: CANCELLED | OUTPATIENT
Start: 2020-12-22

## 2020-12-22 RX ORDER — PANTOPRAZOLE SODIUM 40 MG/1
40 TABLET, DELAYED RELEASE ORAL
Status: CANCELLED | OUTPATIENT
Start: 2020-12-23

## 2020-12-22 RX ORDER — PROPOFOL 10 MG/ML
VIAL (ML) INTRAVENOUS AS NEEDED
Status: DISCONTINUED | OUTPATIENT
Start: 2020-12-22 | End: 2020-12-22 | Stop reason: SURG

## 2020-12-22 RX ORDER — SODIUM CHLORIDE 0.9 % (FLUSH) 0.9 %
10 SYRINGE (ML) INJECTION EVERY 12 HOURS SCHEDULED
Status: CANCELLED | OUTPATIENT
Start: 2020-12-22

## 2020-12-22 RX ORDER — SODIUM CHLORIDE 9 MG/ML
INJECTION, SOLUTION INTRAVENOUS CONTINUOUS PRN
Status: DISCONTINUED | OUTPATIENT
Start: 2020-12-22 | End: 2020-12-22 | Stop reason: SURG

## 2020-12-22 RX ORDER — SODIUM CHLORIDE 0.9 % (FLUSH) 0.9 %
10 SYRINGE (ML) INJECTION AS NEEDED
Status: CANCELLED | OUTPATIENT
Start: 2020-12-22

## 2020-12-22 RX ADMIN — HYDROMORPHONE HYDROCHLORIDE 0.5 MG: 1 INJECTION, SOLUTION INTRAMUSCULAR; INTRAVENOUS; SUBCUTANEOUS at 20:05

## 2020-12-22 RX ADMIN — CLONIDINE HYDROCHLORIDE 0.3 MG: 0.2 TABLET ORAL at 22:25

## 2020-12-22 RX ADMIN — ALBUTEROL SULFATE 2.5 MG: 2.5 SOLUTION RESPIRATORY (INHALATION) at 21:56

## 2020-12-22 RX ADMIN — METRONIDAZOLE 500 MG: 500 INJECTION, SOLUTION INTRAVENOUS at 04:05

## 2020-12-22 RX ADMIN — LEVOTHYROXINE SODIUM 125 MCG: 125 TABLET ORAL at 06:18

## 2020-12-22 RX ADMIN — DILTIAZEM HYDROCHLORIDE 60 MG: 60 TABLET, FILM COATED ORAL at 22:24

## 2020-12-22 RX ADMIN — METOPROLOL TARTRATE 100 MG: 100 TABLET ORAL at 22:25

## 2020-12-22 RX ADMIN — AMITRIPTYLINE HYDROCHLORIDE 50 MG: 25 TABLET, FILM COATED ORAL at 22:25

## 2020-12-22 RX ADMIN — PROPOFOL 50 MG: 10 INJECTION, EMULSION INTRAVENOUS at 12:00

## 2020-12-22 RX ADMIN — METRONIDAZOLE 500 MG: 500 INJECTION, SOLUTION INTRAVENOUS at 18:37

## 2020-12-22 RX ADMIN — HYDROMORPHONE HYDROCHLORIDE 0.5 MG: 1 INJECTION, SOLUTION INTRAMUSCULAR; INTRAVENOUS; SUBCUTANEOUS at 04:43

## 2020-12-22 RX ADMIN — TAZOBACTAM SODIUM AND PIPERACILLIN SODIUM 3.38 G: 375; 3 INJECTION, SOLUTION INTRAVENOUS at 13:39

## 2020-12-22 RX ADMIN — METOPROLOL TARTRATE 100 MG: 100 TABLET ORAL at 08:14

## 2020-12-22 RX ADMIN — POTASSIUM CHLORIDE, DEXTROSE MONOHYDRATE AND SODIUM CHLORIDE 125 ML/HR: 150; 5; 450 INJECTION, SOLUTION INTRAVENOUS at 18:39

## 2020-12-22 RX ADMIN — POTASSIUM CHLORIDE, DEXTROSE MONOHYDRATE AND SODIUM CHLORIDE 125 ML/HR: 150; 5; 450 INJECTION, SOLUTION INTRAVENOUS at 04:05

## 2020-12-22 RX ADMIN — METRONIDAZOLE 500 MG: 500 INJECTION, SOLUTION INTRAVENOUS at 13:02

## 2020-12-22 RX ADMIN — PROPOFOL 70 MG: 10 INJECTION, EMULSION INTRAVENOUS at 12:01

## 2020-12-22 RX ADMIN — CLONIDINE HYDROCHLORIDE 0.3 MG: 0.2 TABLET ORAL at 13:40

## 2020-12-22 RX ADMIN — FAMOTIDINE 20 MG: 10 INJECTION INTRAVENOUS at 22:25

## 2020-12-22 RX ADMIN — TAZOBACTAM SODIUM AND PIPERACILLIN SODIUM 3.38 G: 375; 3 INJECTION, SOLUTION INTRAVENOUS at 22:25

## 2020-12-22 RX ADMIN — FAMOTIDINE 20 MG: 10 INJECTION INTRAVENOUS at 08:14

## 2020-12-22 RX ADMIN — LIDOCAINE HYDROCHLORIDE 50 MG: 20 INJECTION, SOLUTION INTRAVENOUS at 11:59

## 2020-12-22 RX ADMIN — ONDANSETRON HYDROCHLORIDE 4 MG: 2 SOLUTION INTRAMUSCULAR; INTRAVENOUS at 04:43

## 2020-12-22 RX ADMIN — TAZOBACTAM SODIUM AND PIPERACILLIN SODIUM 3.38 G: 375; 3 INJECTION, SOLUTION INTRAVENOUS at 06:18

## 2020-12-22 RX ADMIN — DILTIAZEM HYDROCHLORIDE 60 MG: 60 TABLET, FILM COATED ORAL at 13:03

## 2020-12-22 RX ADMIN — ALBUTEROL SULFATE 2.5 MG: 2.5 SOLUTION RESPIRATORY (INHALATION) at 14:01

## 2020-12-22 RX ADMIN — CLONIDINE HYDROCHLORIDE 0.3 MG: 0.2 TABLET ORAL at 08:14

## 2020-12-22 RX ADMIN — SODIUM CHLORIDE: 9 INJECTION, SOLUTION INTRAVENOUS at 11:58

## 2020-12-22 RX ADMIN — ONDANSETRON HYDROCHLORIDE 4 MG: 2 SOLUTION INTRAMUSCULAR; INTRAVENOUS at 20:05

## 2020-12-22 NOTE — PROGRESS NOTES
Keke Gray MD Progress Note     LOS: 3 days   Patient Care Team:  Yoko Smith APRN as PCP - General (Nurse Practitioner)        Subjective     No fevers or nausea through the night.    Objective     Vital Signs  Temp:  [98.6 °F (37 °C)-98.9 °F (37.2 °C)] 98.6 °F (37 °C)  Heart Rate:  [56-75] 62  Resp:  [20] 20  BP: (136-156)/(74-88) 148/88    Intake & Output (last 3 days)       12/19 0701 - 12/20 0700 12/20 0701 - 12/21 0700 12/21 0701 - 12/22 0700 12/22 0701 - 12/23 0700    I.V.  3991 2279     IV Piggyback  50      Total Intake  4041 2279     Urine  300 100     Total Output  300 100     Net  +3741 +2179             Urine Unmeasured Occurrence 1 x             Physical Exam:     General Appearance:    Alert, cooperative, in no acute distress   Lungs:     respirations regular, even and unlabored    Heart:    Regular rhythm and normal rate, normal S1 and S2, no            murmur, no gallop, no rub   Chest Wall:    No abnormalities observed   Abdomen:      Still pretty tender left side to light palpation   Extremities: No edema,    Results Review:     I reviewed the patient's new clinical results.    Lab Results (last 72 hours)     Procedure Component Value Units Date/Time    COVID-19,[facility] IN-HOUSE,Nasal Swab No Transport Media 3-4 HR TAT - Swab, Nasal Cavity [[id number]]  (Normal) Collected: 12/22/20 0712    Specimen: Swab from Nasal Cavity Updated: 12/22/20 0810     COVID19 Not Detected    Narrative:      Fact sheet for providers: https://www.fda.gov/media/506069/download     Fact sheet for patients: https://www.fda.gov/media/081312/download    Test performed by PCR.    Blood Culture - Blood, Arm, Left [[id number]] Collected: 12/19/20 1509    Specimen: Blood from Arm, Left Updated: 12/21/20 1530     Blood Culture No growth at 2 days    Urine Culture - Urine, Urine, Clean Catch [[id number]]  (Normal) Collected: 12/20/20 1102    Specimen: Urine, Clean Catch Updated: 12/21/20 0840     Urine Culture No growth     Sedimentation Rate [[id number]]  (Abnormal) Collected: 12/21/20 0547    Specimen: Blood Updated: 12/21/20 0705     Sed Rate 61 mm/hr     C-reactive Protein [[id number]]  (Abnormal) Collected: 12/21/20 0547    Specimen: Blood Updated: 12/21/20 0642     C-Reactive Protein 13.82 mg/dL     Comprehensive Metabolic Panel [[id number]]  (Abnormal) Collected: 12/21/20 0547    Specimen: Blood Updated: 12/21/20 0641     Glucose 118 mg/dL      BUN 6 mg/dL      Creatinine 0.71 mg/dL      Sodium 136 mmol/L      Potassium 3.1 mmol/L      Chloride 103 mmol/L      CO2 25.0 mmol/L      Calcium 8.1 mg/dL      Total Protein 6.3 g/dL      Albumin 3.10 g/dL      ALT (SGPT) 12 U/L      AST (SGOT) 15 U/L      Alkaline Phosphatase 69 U/L      Total Bilirubin 0.5 mg/dL      eGFR Non African Amer 86 mL/min/1.73      Globulin 3.2 gm/dL      A/G Ratio 1.0 g/dL      BUN/Creatinine Ratio 8.5     Anion Gap 8.0 mmol/L     Narrative:      GFR Normal >60  Chronic Kidney Disease <60  Kidney Failure <15      CBC (No Diff) [[id number]]  (Abnormal) Collected: 12/21/20 0547    Specimen: Blood Updated: 12/21/20 0617     WBC 11.29 10*3/mm3      RBC 3.67 10*6/mm3      Hemoglobin 10.6 g/dL      Hematocrit 31.2 %      MCV 85.0 fL      MCH 28.9 pg      MCHC 34.0 g/dL      RDW 13.3 %      RDW-SD 41.7 fl      MPV 11.3 fL      Platelets 180 10*3/mm3     Lactic Acid, Plasma [[id number]]  (Normal) Collected: 12/20/20 1156    Specimen: Blood Updated: 12/20/20 1224     Lactate 1.4 mmol/L     Urinalysis With Culture If Indicated - Urine, Clean Catch [[id number]]  (Abnormal) Collected: 12/20/20 1102    Specimen: Urine, Clean Catch Updated: 12/20/20 1117     Color, UA Yellow     Appearance, UA Clear     pH, UA 6.0     Specific Gravity, UA 1.016     Glucose, UA Negative     Ketones, UA Negative     Bilirubin, UA Negative     Blood, UA Trace     Protein, UA Negative     Leuk Esterase, UA Moderate (2+)     Nitrite, UA Negative     Urobilinogen, UA 0.2 E.U./dL    Urinalysis,  Microscopic Only - Urine, Clean Catch [[id number]]  (Abnormal) Collected: 12/20/20 1102    Specimen: Urine, Clean Catch Updated: 12/20/20 1117     RBC, UA 6-12 /HPF      WBC, UA 31-50 /HPF      Bacteria, UA None Seen /HPF      Squamous Epithelial Cells, UA 3-6 /HPF      Hyaline Casts, UA 0-2 /LPF      Methodology Automated Microscopy    Comprehensive Metabolic Panel [[id number]]  (Abnormal) Collected: 12/20/20 0626    Specimen: Blood Updated: 12/20/20 0657     Glucose 123 mg/dL      BUN 10 mg/dL      Creatinine 0.98 mg/dL      Sodium 135 mmol/L      Potassium 3.2 mmol/L      Chloride 100 mmol/L      CO2 25.0 mmol/L      Calcium 8.2 mg/dL      Total Protein 6.4 g/dL      Albumin 3.40 g/dL      ALT (SGPT) 13 U/L      AST (SGOT) 14 U/L      Alkaline Phosphatase 68 U/L      Total Bilirubin 0.5 mg/dL      eGFR Non African Amer 60 mL/min/1.73      Globulin 3.0 gm/dL      A/G Ratio 1.1 g/dL      BUN/Creatinine Ratio 10.2     Anion Gap 10.0 mmol/L     Narrative:      GFR Normal >60  Chronic Kidney Disease <60  Kidney Failure <15      Lactic Acid, Plasma [[id number]]  (Normal) Collected: 12/20/20 0626    Specimen: Blood Updated: 12/20/20 0653     Lactate 1.6 mmol/L     CBC & Differential [[id number]]  (Abnormal) Collected: 12/20/20 0626    Specimen: Blood Updated: 12/20/20 0639    Narrative:      The following orders were created for panel order CBC & Differential.  Procedure                               Abnormality         Status                     ---------                               -----------         ------                     CBC Auto Differential[[id number]]        Abnormal            Final result                 Please view results for these tests on the individual orders.    CBC Auto Differential [[id number]]  (Abnormal) Collected: 12/20/20 0626    Specimen: Blood Updated: 12/20/20 0639     WBC 14.60 10*3/mm3      RBC 3.89 10*6/mm3      Hemoglobin 11.3 g/dL      Hematocrit 33.5 %      MCV 86.1 fL      MCH 29.0 pg       MCHC 33.7 g/dL      RDW 13.8 %      RDW-SD 43.0 fl      MPV 10.8 fL      Platelets 207 10*3/mm3      Neutrophil % 74.2 %      Lymphocyte % 17.7 %      Monocyte % 5.0 %      Eosinophil % 2.2 %      Basophil % 0.3 %      Immature Grans % 0.6 %      Neutrophils, Absolute 10.83 10*3/mm3      Lymphocytes, Absolute 2.59 10*3/mm3      Monocytes, Absolute 0.73 10*3/mm3      Eosinophils, Absolute 0.32 10*3/mm3      Basophils, Absolute 0.04 10*3/mm3      Immature Grans, Absolute 0.09 10*3/mm3      nRBC 0.0 /100 WBC     Lactic Acid, Plasma [[id number]]  (Normal) Collected: 12/19/20 2317    Specimen: Blood Updated: 12/19/20 2345     Lactate 1.3 mmol/L     Urinalysis With Microscopic If Indicated (No Culture) - Urine, Clean Catch [[id number]]  (Abnormal) Collected: 12/19/20 1711    Specimen: Urine, Clean Catch Updated: 12/19/20 1731     Color, UA Yellow     Appearance, UA Clear     pH, UA 5.5     Specific Gravity, UA 1.016     Glucose, UA Negative     Ketones, UA Trace     Bilirubin, UA Negative     Blood, UA Negative     Protein, UA Trace     Leuk Esterase, UA Small (1+)     Nitrite, UA Negative     Urobilinogen, UA 0.2 E.U./dL    Urinalysis, Microscopic Only - Urine, Clean Catch [[id number]]  (Abnormal) Collected: 12/19/20 1711    Specimen: Urine, Clean Catch Updated: 12/19/20 1731     RBC, UA 0-2 /HPF      WBC, UA 6-12 /HPF      Bacteria, UA None Seen /HPF      Squamous Epithelial Cells, UA 0-2 /HPF      Hyaline Casts, UA 0-2 /LPF      Methodology Automated Microscopy    Procalcitonin [[id number]]  (Abnormal) Collected: 12/19/20 1351    Specimen: Blood Updated: 12/19/20 1543     Procalcitonin 0.28 ng/mL     Narrative:      As a Marker for Sepsis (Non-Neonates):   1. <0.5 ng/mL represents a low risk of severe sepsis and/or septic shock.  1. >2 ng/mL represents a high risk of severe sepsis and/or septic shock.    As a Marker for Lower Respiratory Tract Infections that require antibiotic therapy:  PCT on Admission     Antibiotic  "Therapy             6-12 Hrs later  > 0.5                Strongly Recommended            >0.25 - <0.5         Recommended  0.1 - 0.25           Discouraged                   Remeasure/reassess PCT  <0.1                 Strongly Discouraged          Remeasure/reassess PCT      As 28 day mortality risk marker: \"Change in Procalcitonin Result\" (> 80 % or <=80 %) if Day 0 (or Day 1) and Day 4 values are available. Refer to http://www.[email or web address] [email or web address]-pct-calculator.[email or web address]/   Change in PCT <=80 %   A decrease of PCT levels below or equal to 80 % defines a positive change in PCT test result representing a higher risk for 28-day all-cause mortality of patients diagnosed with severe sepsis or septic shock.  Change in PCT > 80 %   A decrease of PCT levels of more than 80 % defines a negative change in PCT result representing a lower risk for 28-day all-cause mortality of patients diagnosed with severe sepsis or septic shock.                Results may be falsely decreased if patient taking Biotin.     Lactic Acid, Plasma [[id number]]  (Normal) Collected: 12/19/20 1509    Specimen: Blood Updated: 12/19/20 1529     Lactate 1.3 mmol/L     Troponin [[id number]]  (Normal) Collected: 12/19/20 1351    Specimen: Blood Updated: 12/19/20 1431     Troponin T <0.010 ng/mL     Narrative:      Troponin T Reference Range:  <= 0.03 ng/mL-   Negative for AMI  >0.03 ng/mL-     Abnormal for myocardial necrosis.  Clinicians would have to utilize clinical acumen, EKG, Troponin and serial changes to determine if it is an Acute Myocardial Infarction or myocardial injury due to an underlying chronic condition.       Results may be falsely decreased if patient taking Biotin.      Comprehensive Metabolic Panel [[id number]]  (Abnormal) Collected: 12/19/20 1351    Specimen: Blood Updated: 12/19/20 1429     Glucose 127 mg/dL      BUN 11 mg/dL      Creatinine 0.89 mg/dL      Sodium 137 mmol/L      Potassium 3.2 mmol/L      Chloride 101 mmol/L      CO2 21.0 mmol/L      " Calcium 9.0 mg/dL      Total Protein 7.1 g/dL      Albumin 4.10 g/dL      ALT (SGPT) 15 U/L      AST (SGOT) 16 U/L      Alkaline Phosphatase 83 U/L      Total Bilirubin 0.6 mg/dL      eGFR Non African Amer 67 mL/min/1.73      Globulin 3.0 gm/dL      A/G Ratio 1.4 g/dL      BUN/Creatinine Ratio 12.4     Anion Gap 15.0 mmol/L     Narrative:      GFR Normal >60  Chronic Kidney Disease <60  Kidney Failure <15      Amylase [[id number]]  (Abnormal) Collected: 12/19/20 1351    Specimen: Blood Updated: 12/19/20 1426     Amylase 24 U/L     CBC Auto Differential [[id number]]  (Abnormal) Collected: 12/19/20 1351    Specimen: Blood Updated: 12/19/20 1413     WBC 18.38 10*3/mm3      RBC 4.15 10*6/mm3      Hemoglobin 12.4 g/dL      Hematocrit 34.8 %      MCV 83.9 fL      MCH 29.9 pg      MCHC 35.6 g/dL      RDW 13.7 %      RDW-SD 41.7 fl      MPV 11.2 fL      Platelets 225 10*3/mm3      Neutrophil % 84.6 %      Lymphocyte % 7.8 %      Monocyte % 6.4 %      Eosinophil % 0.1 %      Basophil % 0.2 %      Immature Grans % 0.9 %      Neutrophils, Absolute 15.56 10*3/mm3      Lymphocytes, Absolute 1.43 10*3/mm3      Monocytes, Absolute 1.18 10*3/mm3      Eosinophils, Absolute 0.02 10*3/mm3      Basophils, Absolute 0.03 10*3/mm3      Immature Grans, Absolute 0.16 10*3/mm3      nRBC 0.0 /100 WBC         Imaging Results (Last 72 Hours)     Procedure Component Value Units Date/Time    XR Chest 1 View [[id number]] Collected: 12/19/20 1406     Updated: 12/19/20 1410    Narrative:      Frontal upright radiograph of the chest 12/19/2020 1:39 PM CST     COMPARISON: May 29, 2019.     HISTORY: Flank pain.     FINDINGS:   The lungs are clear. The cardiomediastinal silhouette and pulmonary  vascularity are within normal limits.      The osseous structures and surrounding soft tissues demonstrate no acute  abnormality.       Impression:      1. No radiographic evidence of acute cardiopulmonary process.        This report was finalized on 12/19/2020  14:06 by Dr. Tal Cook MD.    CT Abdomen Pelvis Stone Protocol [[id number]] Collected: 12/19/20 1253     Updated: 12/19/20 1259    Narrative:      EXAMINATION:   CT ABDOMEN PELVIS STONE PROTOCOL-  12/19/2020 12:53 PM  CST     HISTORY: Flank pain CT ABDOMEN PELVIS STONE PROTOCOL- 12/19/2020 12:34  PM CST     HISTORY: Flank pain, kidney stone suspected      COMPARISON: None      DOSE LENGTH PRODUCT: 734 mGy cm. Automated exposure control was also  utilized to decrease patient radiation dose.     TECHNIQUE: Noncontrast enhanced images of the abdomen and pelvis  obtained without oral contrast. Multiplanar reformatted images were  provided for review.      FINDINGS:   The lung bases and base of the heart are unremarkable.      LIVER: No focal liver lesion.      BILIARY SYSTEM: The gallbladder is unremarkable. No intrahepatic or  extrahepatic ductal dilatation.      PANCREAS: No focal pancreatic lesion.      SPLEEN: Unremarkable.      KIDNEYS AND ADRENALS: Bilateral kidneys and adrenal glands are  unremarkable.  The ureters are decompressed and normal in appearance.     RETROPERITONEUM: No mass, lymphadenopathy or hemorrhage.      GI TRACT: No evidence of obstruction or bowel wall thickening. The  appendix is visualized and unremarkable. Diverticulosis is noted     OTHER: There is no mesenteric mass, lymphadenopathy or fluid collection.  The osseous structures and soft tissues demonstrate no worrisome  lesions.     PELVIS: The uterus is visualized.. The urinary bladder is normal in  appearance.       Impression:      1. Diverticulosis. No acute intra-abdominal or pelvic abnormality is  identified.         This report was finalized on 12/19/2020 12:56 by Dr. Tal Cook MD.              Assessment/Plan       Pain of upper abdomen    Flank pain      For endoscopy today by Dr. Morales.      Keke Gray MD  12/22/20  08:30 CST

## 2020-12-22 NOTE — ANESTHESIA PREPROCEDURE EVALUATION
Anesthesia Evaluation     Patient summary reviewed   NPO Solid Status: > 8 hours             Airway   Mallampati: II  TM distance: >3 FB  Neck ROM: full  Dental    (+) upper dentures    Pulmonary    (-) COPD, asthma, sleep apnea, not a smoker  Cardiovascular   Exercise tolerance: good (4-7 METS)    (+) hypertension,   (-) pacemaker, past MI, angina, cardiac stents      Neuro/Psych  (-) seizures, TIA, CVA  GI/Hepatic/Renal/Endo    (+) obesity,   thyroid problem hypothyroidism  (-) GERD, liver disease, no renal disease, diabetes    Musculoskeletal     Abdominal    Substance History      OB/GYN          Other                        Anesthesia Plan    ASA 2     MAC       Anesthetic plan, all risks, benefits, and alternatives have been provided, discussed and informed consent has been obtained with: patient.

## 2020-12-22 NOTE — PLAN OF CARE
Goal Outcome Evaluation:  Plan of Care Reviewed With: patient  Progress: no change  Outcome Summary: Patient c/o abd pain IV pain medicine given x 1. Up ad stalin. Voiding. NPO for endo today. Tele: bradycardic at 59 bpm per monitor room. Safety maintained. VSS. No distess noted at this time.

## 2020-12-22 NOTE — ANESTHESIA POSTPROCEDURE EVALUATION
Patient: Gale Stone    Procedure Summary     Date: 12/22/20 Room / Location: [facility] ENDOSCOPY 4 / [facility]    Anesthesia Start: 1155 Anesthesia Stop: 1208    Procedure: ESOPHAGOGASTRODUODENOSCOPY WITH ANESTHESIA (N/A ) Diagnosis:       Pain of upper abdomen      (Pain of upper abdomen [R10.10])    Surgeon: Mariela Morales MD Provider: Grady Toro CRNA    Anesthesia Type: MAC ASA Status: 2          Anesthesia Type: MAC    Vitals  Vitals Value Taken Time   BP     Temp     Pulse 49 12/22/20 1208   Resp     SpO2 97 % 12/22/20 1208   Vitals shown include unvalidated device data.        Post Anesthesia Care and Evaluation    Patient location during evaluation: PHASE II  Patient participation: complete - patient participated  Level of consciousness: awake  Pain score: 0  Pain management: adequate  Airway patency: patent  Anesthetic complications: No anesthetic complications  PONV Status: none  Cardiovascular status: acceptable  Respiratory status: acceptable  Hydration status: acceptable

## 2020-12-22 NOTE — PROGRESS NOTES
"CC:\"im still hurting\"    Review of Systems   Constitutional: Positive for fever.   HENT: Negative.    Eyes: Negative.    Respiratory: Negative.    Cardiovascular: Negative.    Gastrointestinal: Positive for abdominal pain.   Endocrine: Negative.    Genitourinary: Positive for flank pain.   Skin: Negative.    Allergic/Immunologic: Negative.    Neurological: Negative.    Hematological: Negative.    Psychiatric/Behavioral: Negative.      Temp:  [98.6 °F (37 °C)-98.9 °F (37.2 °C)] 98.6 °F (37 °C)  Heart Rate:  [56-75] 56  Resp:  [20] 20  BP: (136-156)/(74-84) 142/78  I/O last 3 completed shifts:  In: 3335 [I.V.:3335]  Out: 100 [Urine:100]  No intake/output data recorded.    Physical Exam  Vitals signs and nursing note reviewed.   Constitutional:       Appearance: Normal appearance.   HENT:      Head: Normocephalic and atraumatic.      Nose: Nose normal.      Mouth/Throat:      Mouth: Mucous membranes are moist.      Pharynx: Oropharynx is clear.   Eyes:      Extraocular Movements: Extraocular movements intact.      Conjunctiva/sclera: Conjunctivae normal.      Pupils: Pupils are equal, round, and reactive to light.   Neck:      Musculoskeletal: Normal range of motion.   Cardiovascular:      Rate and Rhythm: Normal rate and regular rhythm.      Pulses: Normal pulses.      Heart sounds: Normal heart sounds.   Pulmonary:      Effort: Pulmonary effort is normal.      Breath sounds: Normal breath sounds.   Abdominal:      General: Abdomen is flat. Bowel sounds are normal.      Palpations: Abdomen is soft.   Musculoskeletal: Normal range of motion.   Skin:     General: Skin is warm.      Capillary Refill: Capillary refill takes less than 2 seconds.   Neurological:      General: No focal deficit present.      Mental Status: She is alert and oriented to person, place, and time. Mental status is at baseline.   Psychiatric:         Mood and Affect: Mood normal.         Behavior: Behavior normal.         Thought Content: Thought " content normal.         Judgment: Judgment normal.           Pain of upper abdomen    Flank pain    For upper endo today

## 2020-12-22 NOTE — PROGRESS NOTES
"CC:\"im still hurting\"    Review of Systems   Constitutional: Positive for fever.   HENT: Negative.    Eyes: Negative.    Respiratory: Negative.    Cardiovascular: Negative.    Gastrointestinal: Positive for abdominal pain.   Endocrine: Negative.    Genitourinary: Positive for flank pain.   Skin: Negative.    Allergic/Immunologic: Negative.    Neurological: Negative.    Hematological: Negative.    Psychiatric/Behavioral: Negative.      Temp:  [98.6 °F (37 °C)-98.9 °F (37.2 °C)] 98.6 °F (37 °C)  Heart Rate:  [56-75] 56  Resp:  [20] 20  BP: (136-156)/(74-84) 142/78  I/O last 3 completed shifts:  In: 3335 [I.V.:3335]  Out: 100 [Urine:100]  No intake/output data recorded.    Physical Exam  Vitals signs and nursing note reviewed.   Constitutional:       Appearance: Normal appearance.   HENT:      Head: Normocephalic and atraumatic.      Nose: Nose normal.      Mouth/Throat:      Mouth: Mucous membranes are dry.      Pharynx: Oropharynx is clear.   Eyes:      Extraocular Movements: Extraocular movements intact.      Conjunctiva/sclera: Conjunctivae normal.      Pupils: Pupils are equal, round, and reactive to light.   Neck:      Musculoskeletal: Normal range of motion and neck supple.   Cardiovascular:      Rate and Rhythm: Normal rate and regular rhythm.      Pulses: Normal pulses.      Heart sounds: Normal heart sounds.   Pulmonary:      Effort: Pulmonary effort is normal.      Breath sounds: Normal breath sounds.   Abdominal:      General: Abdomen is flat. Bowel sounds are normal.      Palpations: Abdomen is soft.   Musculoskeletal: Normal range of motion.   Skin:     General: Skin is warm and dry.      Capillary Refill: Capillary refill takes less than 2 seconds.   Neurological:      General: No focal deficit present.      Mental Status: She is alert and oriented to person, place, and time. Mental status is at baseline.   Psychiatric:         Mood and Affect: Mood normal.         Behavior: Behavior normal.         " Thought Content: Thought content normal.         Judgment: Judgment normal.           Pain of upper abdomen    Flank pain    Appreciate help fo gi and surgery---for endo tomorrow

## 2020-12-23 LAB
ALBUMIN SERPL-MCNC: 3.2 G/DL (ref 3.5–5.2)
ALBUMIN/GLOB SERPL: 0.9 G/DL
ALP SERPL-CCNC: 67 U/L (ref 39–117)
ALT SERPL W P-5'-P-CCNC: 12 U/L (ref 1–33)
ANION GAP SERPL CALCULATED.3IONS-SCNC: 9 MMOL/L (ref 5–15)
AST SERPL-CCNC: 20 U/L (ref 1–32)
BILIRUB SERPL-MCNC: 0.3 MG/DL (ref 0–1.2)
BUN SERPL-MCNC: 5 MG/DL (ref 6–20)
BUN/CREAT SERPL: 7.2 (ref 7–25)
CALCIUM SPEC-SCNC: 8.3 MG/DL (ref 8.6–10.5)
CHLORIDE SERPL-SCNC: 103 MMOL/L (ref 98–107)
CO2 SERPL-SCNC: 24 MMOL/L (ref 22–29)
CREAT SERPL-MCNC: 0.69 MG/DL (ref 0.57–1)
CRP SERPL-MCNC: 4.7 MG/DL (ref 0–0.5)
DEPRECATED RDW RBC AUTO: 41.3 FL (ref 37–54)
ERYTHROCYTE [DISTWIDTH] IN BLOOD BY AUTOMATED COUNT: 13.3 % (ref 12.3–15.4)
ERYTHROCYTE [SEDIMENTATION RATE] IN BLOOD: 46 MM/HR (ref 0–20)
GFR SERPL CREATININE-BSD FRML MDRD: 89 ML/MIN/1.73
GLOBULIN UR ELPH-MCNC: 3.5 GM/DL
GLUCOSE SERPL-MCNC: 117 MG/DL (ref 65–99)
HCT VFR BLD AUTO: 31.1 % (ref 34–46.6)
HGB BLD-MCNC: 10.9 G/DL (ref 12–15.9)
MCH RBC QN AUTO: 29.7 PG (ref 26.6–33)
MCHC RBC AUTO-ENTMCNC: 35 G/DL (ref 31.5–35.7)
MCV RBC AUTO: 84.7 FL (ref 79–97)
PLATELET # BLD AUTO: 223 10*3/MM3 (ref 140–450)
PMV BLD AUTO: 11 FL (ref 6–12)
POTASSIUM SERPL-SCNC: 3.5 MMOL/L (ref 3.5–5.2)
PROT SERPL-MCNC: 6.7 G/DL (ref 6–8.5)
RBC # BLD AUTO: 3.67 10*6/MM3 (ref 3.77–5.28)
SODIUM SERPL-SCNC: 136 MMOL/L (ref 136–145)
UREASE TISS QL: NEGATIVE
WBC # BLD AUTO: 10.56 10*3/MM3 (ref 3.4–10.8)

## 2020-12-23 PROCEDURE — 94799 UNLISTED PULMONARY SVC/PX: CPT

## 2020-12-23 PROCEDURE — 99231 SBSQ HOSP IP/OBS SF/LOW 25: CPT | Performed by: INTERNAL MEDICINE

## 2020-12-23 PROCEDURE — 25010000002 KETOROLAC TROMETHAMINE PER 15 MG: Performed by: FAMILY MEDICINE

## 2020-12-23 PROCEDURE — 85651 RBC SED RATE NONAUTOMATED: CPT | Performed by: FAMILY MEDICINE

## 2020-12-23 PROCEDURE — 80053 COMPREHEN METABOLIC PANEL: CPT | Performed by: FAMILY MEDICINE

## 2020-12-23 PROCEDURE — 25010000002 HYDROMORPHONE PER 4 MG: Performed by: FAMILY MEDICINE

## 2020-12-23 PROCEDURE — 25010000002 ONDANSETRON PER 1 MG: Performed by: FAMILY MEDICINE

## 2020-12-23 PROCEDURE — 25010000002 PIPERACILLIN SOD-TAZOBACTAM PER 1 G: Performed by: FAMILY MEDICINE

## 2020-12-23 PROCEDURE — 99231 SBSQ HOSP IP/OBS SF/LOW 25: CPT | Performed by: FAMILY MEDICINE

## 2020-12-23 PROCEDURE — 63710000001 ONDANSETRON PER 8 MG: Performed by: FAMILY MEDICINE

## 2020-12-23 PROCEDURE — 86140 C-REACTIVE PROTEIN: CPT | Performed by: FAMILY MEDICINE

## 2020-12-23 PROCEDURE — 25010000002 PROMETHAZINE PER 50 MG: Performed by: FAMILY MEDICINE

## 2020-12-23 PROCEDURE — 85027 COMPLETE CBC AUTOMATED: CPT | Performed by: FAMILY MEDICINE

## 2020-12-23 RX ORDER — PANTOPRAZOLE SODIUM 40 MG/1
40 TABLET, DELAYED RELEASE ORAL
Status: DISCONTINUED | OUTPATIENT
Start: 2020-12-24 | End: 2020-12-24 | Stop reason: HOSPADM

## 2020-12-23 RX ORDER — SUCRALFATE 1 G/1
1 TABLET ORAL
Status: DISCONTINUED | OUTPATIENT
Start: 2020-12-23 | End: 2020-12-23

## 2020-12-23 RX ORDER — SUCRALFATE 1 G/1
1 TABLET ORAL
Status: DISCONTINUED | OUTPATIENT
Start: 2020-12-23 | End: 2020-12-24 | Stop reason: HOSPADM

## 2020-12-23 RX ORDER — KETOROLAC TROMETHAMINE 30 MG/ML
30 INJECTION, SOLUTION INTRAMUSCULAR; INTRAVENOUS ONCE
Status: COMPLETED | OUTPATIENT
Start: 2020-12-23 | End: 2020-12-23

## 2020-12-23 RX ADMIN — POTASSIUM CHLORIDE, DEXTROSE MONOHYDRATE AND SODIUM CHLORIDE 125 ML/HR: 150; 5; 450 INJECTION, SOLUTION INTRAVENOUS at 17:41

## 2020-12-23 RX ADMIN — TAZOBACTAM SODIUM AND PIPERACILLIN SODIUM 3.38 G: 375; 3 INJECTION, SOLUTION INTRAVENOUS at 04:03

## 2020-12-23 RX ADMIN — CLONIDINE HYDROCHLORIDE 0.3 MG: 0.2 TABLET ORAL at 21:00

## 2020-12-23 RX ADMIN — ALBUTEROL SULFATE 2.5 MG: 2.5 SOLUTION RESPIRATORY (INHALATION) at 11:41

## 2020-12-23 RX ADMIN — SUCRALFATE 1 G: 1 TABLET ORAL at 22:02

## 2020-12-23 RX ADMIN — TAZOBACTAM SODIUM AND PIPERACILLIN SODIUM 3.38 G: 375; 3 INJECTION, SOLUTION INTRAVENOUS at 21:00

## 2020-12-23 RX ADMIN — PROMETHAZINE HYDROCHLORIDE 25 MG: 25 INJECTION INTRAMUSCULAR; INTRAVENOUS at 21:00

## 2020-12-23 RX ADMIN — LEVOTHYROXINE SODIUM 125 MCG: 125 TABLET ORAL at 06:27

## 2020-12-23 RX ADMIN — POTASSIUM CHLORIDE, DEXTROSE MONOHYDRATE AND SODIUM CHLORIDE 125 ML/HR: 150; 5; 450 INJECTION, SOLUTION INTRAVENOUS at 06:26

## 2020-12-23 RX ADMIN — KETOROLAC TROMETHAMINE 30 MG: 30 INJECTION, SOLUTION INTRAMUSCULAR; INTRAVENOUS at 06:26

## 2020-12-23 RX ADMIN — METRONIDAZOLE 500 MG: 500 INJECTION, SOLUTION INTRAVENOUS at 04:03

## 2020-12-23 RX ADMIN — PROMETHAZINE HYDROCHLORIDE 25 MG: 25 INJECTION INTRAMUSCULAR; INTRAVENOUS at 13:33

## 2020-12-23 RX ADMIN — HYDROMORPHONE HYDROCHLORIDE 0.5 MG: 1 INJECTION, SOLUTION INTRAMUSCULAR; INTRAVENOUS; SUBCUTANEOUS at 21:03

## 2020-12-23 RX ADMIN — METOPROLOL TARTRATE 100 MG: 100 TABLET ORAL at 21:00

## 2020-12-23 RX ADMIN — DILTIAZEM HYDROCHLORIDE 60 MG: 60 TABLET, FILM COATED ORAL at 21:00

## 2020-12-23 RX ADMIN — LOSARTAN POTASSIUM 50 MG: 50 TABLET, FILM COATED ORAL at 08:26

## 2020-12-23 RX ADMIN — HYDROMORPHONE HYDROCHLORIDE 0.5 MG: 1 INJECTION, SOLUTION INTRAMUSCULAR; INTRAVENOUS; SUBCUTANEOUS at 12:18

## 2020-12-23 RX ADMIN — AMITRIPTYLINE HYDROCHLORIDE 50 MG: 25 TABLET, FILM COATED ORAL at 21:00

## 2020-12-23 RX ADMIN — METRONIDAZOLE 500 MG: 500 INJECTION, SOLUTION INTRAVENOUS at 10:36

## 2020-12-23 RX ADMIN — FAMOTIDINE 20 MG: 10 INJECTION INTRAVENOUS at 08:17

## 2020-12-23 RX ADMIN — SODIUM CHLORIDE, PRESERVATIVE FREE 10 ML: 5 INJECTION INTRAVENOUS at 08:18

## 2020-12-23 RX ADMIN — NITROGLYCERIN 0.4 MG: 0.4 TABLET SUBLINGUAL at 22:06

## 2020-12-23 RX ADMIN — SODIUM CHLORIDE, PRESERVATIVE FREE 10 ML: 5 INJECTION INTRAVENOUS at 21:02

## 2020-12-23 RX ADMIN — METRONIDAZOLE 500 MG: 500 INJECTION, SOLUTION INTRAVENOUS at 18:50

## 2020-12-23 RX ADMIN — ONDANSETRON 4 MG: 4 TABLET, FILM COATED ORAL at 12:17

## 2020-12-23 RX ADMIN — ONDANSETRON HYDROCHLORIDE 4 MG: 2 SOLUTION INTRAMUSCULAR; INTRAVENOUS at 06:26

## 2020-12-23 RX ADMIN — TAZOBACTAM SODIUM AND PIPERACILLIN SODIUM 3.38 G: 375; 3 INJECTION, SOLUTION INTRAVENOUS at 12:17

## 2020-12-23 NOTE — CONSULTS
INFECTIOUS DISEASES CONSULT NOTE    Patient:  Gale Stone 50 y.o. female  ROOM # 385/1  YOB: 1970  MRN: [id number]  [facility]:  [id number]  Admit date: 12/19/2020   Admitting Physician: Devonte Espitia MD  Primary Care Physician: Yoko Smith APRN  REFERRING PROVIDER: Devonte Espitia, *    Reason for Consultation: Fever for 30 days.  Question source.    History of Present Illness/Chief Complaint: Pleasant 50-year-old woman.  She indicates development of fever around November 19.  What she describes she had coronavirus testing on November 23 which was positive.  She reports some issues with intermittent temperature elevation.  She also describes some problems with some epigastric discomfort along with nausea vomiting.  I got the impression the nausea vomiting has been present for the past 10 to 14 days.  She also reports some loose stool and at times watery diarrhea.  He was hard for me to get a definite description of her time course of symptoms.  She has not had cough, shortness of breath or dyspnea.  She is on room air oxygen.  She has undergone some GI evaluation.  She decayed up until the last 2 days she had some problems with fever.  Currently fever seems to be resolved.  Infectious disease is asked to evaluate her history of fever over the past month and offer recommendations.  Reviewed vital sign profile from December 21, 2020 through today.  Her highest temperature elevation during that timeframe has been 99.3.    Current Scheduled Medications:   amitriptyline, 50 mg, Oral, Nightly  aspirin, 325 mg, Oral, Daily  cloNIDine, 0.3 mg, Oral, TID  dilTIAZem, 60 mg, Oral, Q8H  famotidine, 20 mg, Intravenous, Q12H  latanoprost, 1 drop, Both Eyes, Nightly  levothyroxine, 125 mcg, Oral, Q AM  losartan, 50 mg, Oral, Q24H  metoprolol tartrate, 100 mg, Oral, Q12H  metroNIDAZOLE, 500 mg, Intravenous, Q8H  piperacillin-tazobactam, 3.375 g, Intravenous, Q8H  sodium chloride, 10 mL, Intravenous,  Q12H      Current PRN Medications:  •  albuterol  •  calcium carbonate  •  HYDROmorphone **AND** naloxone  •  nitroglycerin  •  ondansetron **OR** ondansetron  •  promethazine  •  sodium chloride    Allergies:    Allergies   Allergen Reactions   • Other Palpitations     Patient reports allergy to IV steriods     Past Medical History: She reports history of glaucoma, asthma, hypertension, hypothyroidism, hypoglycemia, and degenerative arthritis.  She reports no history of diabetes mellitus, valvular heart disease, renal disease.    Past Surgical History: Tubal ligation.  She has had procedures on each eye for glaucoma.    Social History: No tobacco use.  No alcohol use.  No illicit drug use.    Family History: She did not report any significant family history.    Exposure History: No close contacts of been ill    Review of Systems  No chest pain or chest pressure  No weight loss  No productive cough or sputum production  No urinary symptoms    Vital Signs:  /84 (BP Location: Left arm, Patient Position: Lying)   Pulse 70   Temp 98.5 °F (36.9 °C)   Resp 18   SpO2 99%  Temp (24hrs), Av.5 °F (36.9 °C), Min:98.3 °F (36.8 °C), Max:98.8 °F (37.1 °C)    Physical Exam  Vital signs - reviewed.  General alert, pleasant, no distress.  She is not coughing.  She was not dyspneic.  Sclera nonicteric.  No conjunctival hemorrhages.  Lungs clear to auscultation without crackles or wheezes  Heart regular rhythm without murmur  Abdomen revealed normal bowel sounds.  Her abdomen was soft, nontender, nondistended.  There is no guarding or rebound.  Extremities without edema    Lab Results:  CBC:   Results from last 7 days   Lab Units 20  0538 20  0547 20  0626 20  1351   WBC 10*3/mm3 10.56 11.29* 14.60* 18.38*   HEMOGLOBIN g/dL 10.9* 10.6* 11.3* 12.4   HEMATOCRIT % 31.1* 31.2* 33.5* 34.8   PLATELETS 10*3/mm3 223 180 207 225     CMP:   Results from last 7 days   Lab Units 20  0538 20  0547  12/20/20  0626 12/19/20  1351   SODIUM mmol/L 136 136 135* 137   POTASSIUM mmol/L 3.5 3.1* 3.2* 3.2*   CHLORIDE mmol/L 103 103 100 101   CO2 mmol/L 24.0 25.0 25.0 21.0*   BUN mg/dL 5* 6 10 11   CREATININE mg/dL 0.69 0.71 0.98 0.89   CALCIUM mg/dL 8.3* 8.1* 8.2* 9.0   BILIRUBIN mg/dL 0.3 0.5 0.5 0.6   ALK PHOS U/L 67 69 68 83   ALT (SGPT) U/L 12 12 13 15   AST (SGOT) U/L 20 15 14 16   GLUCOSE mg/dL 117* 118* 123* 127*     COVID-19 PCR done December 22, 2020-negative  C-reactive protein 13.8  Sedimentation rate 61  Serum lactate 1.4  Procalcitonin 0.28  Troponin less than 0.01  Amylase 24    Radiology:   CT scan of the abdomen and pelvis done December 19, 2020:  IMPRESSION:  1. Diverticulosis. No acute intra-abdominal or pelvic abnormality is  identified.   This report was finalized on 12/19/2020 12:56 by Dr. Tal Cook MD.    Impression:   1.  Consulted for fever-has not had significant temperature elevation over the past 72 hours.  Possible fever has resolved.  She has had some nausea with vomiting.  She is also had some loose stool.  GI PCR panel may be helpful.  2.  Elevated CRP-etiology uncertain.  Will check stool GI PCR panel.  Would suggest discontinuing antibiotic treatment.  Would suggest following for temperature elevation and culturing off antibiotic treatment.  3.  History hypothyroidism on replacement.  Going to check free T4 and TSH.  Thyroid over replacement could explain element of fever.    Recommendations:    Stool GI PCR panel  Discontinue Zosyn  Follow for temperature elevation off antibiotic treatment  Repeat markers of inflammation  Check thyroid function studies  Continue to follow    Moreno Boland MD  12/23/20  12:06 CST

## 2020-12-23 NOTE — PLAN OF CARE
Goal Outcome Evaluation:  Plan of Care Reviewed With: patient  Progress: no change  Outcome Summary: ivf/ iv abx cont. + nausea with small amount of liquid emesis.  c/o abd pain, medicated x1 with dilaudid iv so far. voiding without difficulty. up ad stalin. cont to monitor.

## 2020-12-23 NOTE — PLAN OF CARE
Goal Outcome Evaluation:  Plan of Care Reviewed With: patient  Progress: improving  Outcome Summary: Patient c/o pain x 1 this shift. Nausea medicine given x 1 this shift. Infectious Disease consulted last night per Dr. Espitia for extended fever and nausea/abd pain. Up ad stalin. IVF and IV abx. Tolerating a regular diet. VSS. Safety maintained. No distress noted at this time.

## 2020-12-23 NOTE — PROGRESS NOTES
[facility]  Inpatient Progress Note  Today's date:  12/23/20    Gale Stone  1970       Reason for Follow Up: Epigastric pain    Subjective: The patient is lying in bed this morning.  She tells me she is feeling much better.  She states that she had a small bowel movement that was normal.  She states she was able to eat her breakfast without difficulty.    The patient denies any nausea, vomiting, epigastric pain, dysphagia, pyrosis or hematemesis.  The patient denies any fever or chills.  Denies any melena or hematochezia.  Denies any unintentional weight loss or loss of appetite.    The patient is status post EGD on 12/22/2020 for epigastric abdominal pain findings of LA grade C reflux esophagitis and nonerosive gastroduodenitis.    Pepcid was stopped and pantoprazole initiated.  The patient has had H. pylori in the past that was treated.  Urease is now neg--she is cured.    On afternoon rounds, she is having some nausea again. Chart shows that she is getting NSAIDS, antibiotics and hadn't been getting PPI.    Allergies   Allergen Reactions   • Other Palpitations     Patient reports allergy to IV steriods       Current Facility-Administered Medications:   •  albuterol (PROVENTIL) nebulizer solution 0.083% 2.5 mg/3mL, 2.5 mg, Nebulization, Q6H PRN, Devonte Espitia MD, 2.5 mg at 12/22/20 2156  •  amitriptyline (ELAVIL) tablet 50 mg, 50 mg, Oral, Nightly, Devonte Espitia MD, 50 mg at 12/22/20 2225  •  aspirin tablet 325 mg, 325 mg, Oral, Daily, Devonte Espitia MD  •  calcium carbonate (TUMS) chewable tablet 500 mg (200 mg elemental), 2 tablet, Oral, TID PRN, Devonte Espitia MD, 2 tablet at 12/21/20 2056  •  cloNIDine (CATAPRES) tablet 0.3 mg, 0.3 mg, Oral, TID, Devonte Espitia MD, 0.3 mg at 12/22/20 2225  •  dextrose 5 % and sodium chloride 0.45 % with KCl 20 mEq/L infusion, 125 mL/hr, Intravenous, Continuous, Devonte Espitia MD, Last Rate: 125  mL/hr at 12/23/20 0626, 125 mL/hr at 12/23/20 0626  •  dilTIAZem (CARDIZEM) tablet 60 mg, 60 mg, Oral, Q8H, Devonte Espitia MD, 60 mg at 12/22/20 2224  •  famotidine (PEPCID) injection 20 mg, 20 mg, Intravenous, Q12H, Devonte Espitia MD, 20 mg at 12/23/20 0817  •  HYDROmorphone (DILAUDID) injection 0.5 mg, 0.5 mg, Intravenous, Q2H PRN, 0.5 mg at 12/22/20 2005 **AND** naloxone (NARCAN) injection 0.4 mg, 0.4 mg, Intravenous, Q5 Min PRN, Devonte Espitia MD  •  latanoprost (XALATAN) 0.005 % ophthalmic solution 1 drop, 1 drop, Both Eyes, Nightly, Devonte Espitia MD  •  levothyroxine (SYNTHROID, LEVOTHROID) tablet 125 mcg, 125 mcg, Oral, Q AM, Devonte Espitia MD, 125 mcg at 12/23/20 0627  •  losartan (COZAAR) tablet 50 mg, 50 mg, Oral, Q24H, Devonte Espitia MD, 50 mg at 12/23/20 0826  •  metoprolol tartrate (LOPRESSOR) tablet 100 mg, 100 mg, Oral, Q12H, Devonte Espitia MD, 100 mg at 12/22/20 2225  •  metroNIDAZOLE (FLAGYL) 500 mg/100mL IVPB, 500 mg, Intravenous, Q8H, Devonte Espitia MD, Last Rate: 0 mL/hr at 12/21/20 1900, 500 mg at 12/23/20 1036  •  nitroglycerin (NITROSTAT) SL tablet 0.4 mg, 0.4 mg, Sublingual, Q5 Min PRN, Devonte Espitia MD  •  ondansetron (ZOFRAN) tablet 4 mg, 4 mg, Oral, Q6H PRN **OR** ondansetron (ZOFRAN) injection 4 mg, 4 mg, Intravenous, Q6H PRN, Devonte Espitia MD, 4 mg at 12/23/20 0626  •  piperacillin-tazobactam (ZOSYN) 3.375 g in iso-osmotic dextrose 50 ml (premix), 3.375 g, Intravenous, Q8H, Devonte Espitia MD, Last Rate: 0 mL/hr at 12/22/20 0156, 3.375 g at 12/23/20 0403  •  promethazine (PHENERGAN) 25 mg in sodium chloride 0.9 % 50 mL, 25 mg, Intravenous, Q6H PRN, Devonte Espitia MD, Stopped at 12/21/20 0646  •  sodium chloride 0.9 % flush 10 mL, 10 mL, Intravenous, Q12H, Devonte Espitia MD, 10 mL at 12/23/20 0818  •  sodium chloride 0.9 % flush 10 mL, 10 mL, Intravenous, PRN, Devonte Espitia,  MD    Review of Systems:   Review of Systems   Constitutional: Negative for fever and unexpected weight change.   HENT: Negative for hearing loss.    Eyes: Negative for visual disturbance.   Respiratory: Negative for cough.    Cardiovascular: Negative for chest pain.   Gastrointestinal:        See HPI   Endocrine: Negative for cold intolerance and heat intolerance.   Genitourinary: Negative for dysuria.   Musculoskeletal: Negative for arthralgias.   Skin: Negative for rash.   Neurological: Negative for seizures.   Psychiatric/Behavioral: Negative for hallucinations.        Vital Signs:  Temp:  [98.3 °F (36.8 °C)-98.8 °F (37.1 °C)] 98.7 °F (37.1 °C)  Heart Rate:  [48-64] 58  Resp:  [15-19] 18  BP: (136-172)/(78-94) 172/91  There is no height or weight on file to calculate BMI.     Intake/Output Summary (Last 24 hours) at 12/23/2020 1053  Last data filed at 12/23/2020 1036  Gross per 24 hour   Intake 2226 ml   Output 2300 ml   Net -74 ml     I/O this shift:  In: 100 [IV Piggyback:100]  Out: 400 [Urine:400]  Physical Exam:  Physical Exam  Constitutional:       Appearance: She is well-developed.   Cardiovascular:      Rate and Rhythm: Normal rate and regular rhythm.   Pulmonary:      Effort: Pulmonary effort is normal.   Abdominal:      General: Bowel sounds are normal.      Palpations: Abdomen is soft.   Neurological:      Mental Status: She is alert.          Results Review:   I have reviewed all of the patient's current test results    Results from last 7 days   Lab Units 12/23/20  0538 12/21/20  0547 12/20/20  0626   WBC 10*3/mm3 10.56 11.29* 14.60*   HEMOGLOBIN g/dL 10.9* 10.6* 11.3*   HEMATOCRIT % 31.1* 31.2* 33.5*   PLATELETS 10*3/mm3 223 180 207       Results from last 7 days   Lab Units 12/23/20  0538 12/21/20  0547 12/20/20  0626   SODIUM mmol/L 136 136 135*   POTASSIUM mmol/L 3.5 3.1* 3.2*   CHLORIDE mmol/L 103 103 100   CO2 mmol/L 24.0 25.0 25.0   BUN mg/dL 5* 6 10   CREATININE mg/dL 0.69 0.71 0.98   CALCIUM  mg/dL 8.3* 8.1* 8.2*   BILIRUBIN mg/dL 0.3 0.5 0.5   ALK PHOS U/L 67 69 68   ALT (SGPT) U/L 12 12 13   AST (SGOT) U/L 20 15 14   GLUCOSE mg/dL 117* 118* 123*       Impression/Plan:  Epigastric pain/nausea  EGD on 12/22/2020 for epigastric abdominal pain showed LA grade C reflux esophagitis and nonerosive gastroduodenitis.  Urease for H. pylori negative.  Pepcid was stopped and pantoprazole initiated.  Add carafate.  Limit antibiotics if possible due to nausea.  Limit NSAIDs due to inflammation seen on endoscopy.  The patient has had H. pylori in the past that was treated and now proven to be cured.  Patient will need outpatient follow-up with Dr. Maurice Moss as needed.  OK to discharge when OK with others.  GI is signing off.    IRINA Dickens  12/23/20  10:53 CST     Mariela Morales MD  [facility]  12/23/20  12:41 CST

## 2020-12-23 NOTE — PROGRESS NOTES
Upper endoscopy results noted. Patient feeling better. Less tenderness and pain. Will follow peripherally. No need for surgical intervention at this time.

## 2020-12-24 VITALS
RESPIRATION RATE: 16 BRPM | HEART RATE: 65 BPM | SYSTOLIC BLOOD PRESSURE: 148 MMHG | HEIGHT: 63 IN | BODY MASS INDEX: 34.37 KG/M2 | TEMPERATURE: 98.5 F | OXYGEN SATURATION: 98 % | DIASTOLIC BLOOD PRESSURE: 85 MMHG

## 2020-12-24 LAB
ANION GAP SERPL CALCULATED.3IONS-SCNC: 11 MMOL/L (ref 5–15)
BACTERIA SPEC AEROBE CULT: NORMAL
BASOPHILS # BLD AUTO: 0.05 10*3/MM3 (ref 0–0.2)
BASOPHILS NFR BLD AUTO: 0.5 % (ref 0–1.5)
BUN SERPL-MCNC: 4 MG/DL (ref 6–20)
BUN/CREAT SERPL: 6 (ref 7–25)
CALCIUM SPEC-SCNC: 8.8 MG/DL (ref 8.6–10.5)
CHLORIDE SERPL-SCNC: 105 MMOL/L (ref 98–107)
CO2 SERPL-SCNC: 23 MMOL/L (ref 22–29)
CREAT SERPL-MCNC: 0.67 MG/DL (ref 0.57–1)
CRP SERPL-MCNC: 2.71 MG/DL (ref 0–0.5)
DEPRECATED RDW RBC AUTO: 41.4 FL (ref 37–54)
EOSINOPHIL # BLD AUTO: 0.6 10*3/MM3 (ref 0–0.4)
EOSINOPHIL NFR BLD AUTO: 5.5 % (ref 0.3–6.2)
ERYTHROCYTE [DISTWIDTH] IN BLOOD BY AUTOMATED COUNT: 13.3 % (ref 12.3–15.4)
FERRITIN SERPL-MCNC: 372.9 NG/ML (ref 13–150)
GFR SERPL CREATININE-BSD FRML MDRD: 92 ML/MIN/1.73
GLUCOSE SERPL-MCNC: 90 MG/DL (ref 65–99)
HCT VFR BLD AUTO: 35.2 % (ref 34–46.6)
HGB BLD-MCNC: 11.8 G/DL (ref 12–15.9)
IMM GRANULOCYTES # BLD AUTO: 0.18 10*3/MM3 (ref 0–0.05)
IMM GRANULOCYTES NFR BLD AUTO: 1.7 % (ref 0–0.5)
LYMPHOCYTES # BLD AUTO: 2.72 10*3/MM3 (ref 0.7–3.1)
LYMPHOCYTES NFR BLD AUTO: 25.1 % (ref 19.6–45.3)
MCH RBC QN AUTO: 28.8 PG (ref 26.6–33)
MCHC RBC AUTO-ENTMCNC: 33.5 G/DL (ref 31.5–35.7)
MCV RBC AUTO: 85.9 FL (ref 79–97)
MONOCYTES # BLD AUTO: 0.68 10*3/MM3 (ref 0.1–0.9)
MONOCYTES NFR BLD AUTO: 6.3 % (ref 5–12)
NEUTROPHILS NFR BLD AUTO: 6.6 10*3/MM3 (ref 1.7–7)
NEUTROPHILS NFR BLD AUTO: 60.9 % (ref 42.7–76)
NRBC BLD AUTO-RTO: 0 /100 WBC (ref 0–0.2)
PLATELET # BLD AUTO: 270 10*3/MM3 (ref 140–450)
PMV BLD AUTO: 11 FL (ref 6–12)
POTASSIUM SERPL-SCNC: 3.8 MMOL/L (ref 3.5–5.2)
RBC # BLD AUTO: 4.1 10*6/MM3 (ref 3.77–5.28)
SODIUM SERPL-SCNC: 139 MMOL/L (ref 136–145)
T4 FREE SERPL-MCNC: 1.48 NG/DL (ref 0.93–1.7)
TSH SERPL DL<=0.05 MIU/L-ACNC: 2.83 UIU/ML (ref 0.27–4.2)
WBC # BLD AUTO: 10.83 10*3/MM3 (ref 3.4–10.8)

## 2020-12-24 PROCEDURE — 85025 COMPLETE CBC W/AUTO DIFF WBC: CPT | Performed by: INTERNAL MEDICINE

## 2020-12-24 PROCEDURE — 25010000002 ONDANSETRON PER 1 MG: Performed by: FAMILY MEDICINE

## 2020-12-24 PROCEDURE — 86140 C-REACTIVE PROTEIN: CPT | Performed by: INTERNAL MEDICINE

## 2020-12-24 PROCEDURE — 80048 BASIC METABOLIC PNL TOTAL CA: CPT | Performed by: FAMILY MEDICINE

## 2020-12-24 PROCEDURE — 94799 UNLISTED PULMONARY SVC/PX: CPT

## 2020-12-24 PROCEDURE — 82728 ASSAY OF FERRITIN: CPT | Performed by: INTERNAL MEDICINE

## 2020-12-24 PROCEDURE — 25010000002 HYDROMORPHONE PER 4 MG: Performed by: FAMILY MEDICINE

## 2020-12-24 PROCEDURE — 99238 HOSP IP/OBS DSCHRG MGMT 30/<: CPT | Performed by: FAMILY MEDICINE

## 2020-12-24 PROCEDURE — 84443 ASSAY THYROID STIM HORMONE: CPT | Performed by: FAMILY MEDICINE

## 2020-12-24 PROCEDURE — 84439 ASSAY OF FREE THYROXINE: CPT | Performed by: FAMILY MEDICINE

## 2020-12-24 PROCEDURE — 25010000002 PIPERACILLIN SOD-TAZOBACTAM PER 1 G: Performed by: FAMILY MEDICINE

## 2020-12-24 RX ORDER — PANTOPRAZOLE SODIUM 40 MG/1
40 TABLET, DELAYED RELEASE ORAL DAILY
Qty: 30 TABLET | Refills: 1 | Status: SHIPPED | OUTPATIENT
Start: 2020-12-24

## 2020-12-24 RX ORDER — SUCRALFATE 1 G/1
1 TABLET ORAL 4 TIMES DAILY
Qty: 120 TABLET | Refills: 1 | Status: SHIPPED | OUTPATIENT
Start: 2020-12-24 | End: 2021-04-27

## 2020-12-24 RX ORDER — SODIUM CHLORIDE 9 MG/ML
50 INJECTION, SOLUTION INTRAVENOUS CONTINUOUS
Status: DISCONTINUED | OUTPATIENT
Start: 2020-12-24 | End: 2020-12-24 | Stop reason: HOSPADM

## 2020-12-24 RX ADMIN — TAZOBACTAM SODIUM AND PIPERACILLIN SODIUM 3.38 G: 375; 3 INJECTION, SOLUTION INTRAVENOUS at 05:46

## 2020-12-24 RX ADMIN — ONDANSETRON HYDROCHLORIDE 4 MG: 2 SOLUTION INTRAMUSCULAR; INTRAVENOUS at 05:46

## 2020-12-24 RX ADMIN — DILTIAZEM HYDROCHLORIDE 60 MG: 60 TABLET, FILM COATED ORAL at 06:35

## 2020-12-24 RX ADMIN — HYDROMORPHONE HYDROCHLORIDE 0.5 MG: 1 INJECTION, SOLUTION INTRAMUSCULAR; INTRAVENOUS; SUBCUTANEOUS at 12:46

## 2020-12-24 RX ADMIN — METRONIDAZOLE 500 MG: 500 INJECTION, SOLUTION INTRAVENOUS at 03:57

## 2020-12-24 RX ADMIN — HYDROMORPHONE HYDROCHLORIDE 0.5 MG: 1 INJECTION, SOLUTION INTRAMUSCULAR; INTRAVENOUS; SUBCUTANEOUS at 05:52

## 2020-12-24 RX ADMIN — SODIUM CHLORIDE 50 ML/HR: 9 INJECTION, SOLUTION INTRAVENOUS at 10:01

## 2020-12-24 RX ADMIN — LEVOTHYROXINE SODIUM 125 MCG: 125 TABLET ORAL at 06:35

## 2020-12-24 RX ADMIN — PANTOPRAZOLE SODIUM 40 MG: 40 TABLET, DELAYED RELEASE ORAL at 06:35

## 2020-12-24 RX ADMIN — NITROGLYCERIN 0.4 MG: 0.4 TABLET SUBLINGUAL at 11:55

## 2020-12-24 RX ADMIN — ALBUTEROL SULFATE 2.5 MG: 2.5 SOLUTION RESPIRATORY (INHALATION) at 09:12

## 2020-12-24 RX ADMIN — CLONIDINE HYDROCHLORIDE 0.3 MG: 0.2 TABLET ORAL at 11:57

## 2020-12-24 RX ADMIN — SUCRALFATE 1 G: 1 TABLET ORAL at 11:27

## 2020-12-24 RX ADMIN — ONDANSETRON HYDROCHLORIDE 4 MG: 2 SOLUTION INTRAMUSCULAR; INTRAVENOUS at 12:46

## 2020-12-24 RX ADMIN — SUCRALFATE 1 G: 1 TABLET ORAL at 06:35

## 2020-12-24 NOTE — PLAN OF CARE
Goal Outcome Evaluation:  Plan of Care Reviewed With: patient  Progress: no change  Outcome Summary: c/o abd pain, vomiting, up ad stalin, medicated per mar for vomiting and pain, nitro x1 for chest pain per pt for elevated BP related to not able to keep meds down, continue to monitor for changes.

## 2020-12-24 NOTE — PROGRESS NOTES
"Infectious Diseases Progress Note    Patient:  Gale Stone  YOB: 1970  MRN: [id number]   Admit date: 12/19/2020   Admitting Physician: Devonte Espitia MD  Primary Care Physician: Yoko Smith APRN    Chief Complaint/Interval History: She remains without fever off antibiotic treatment.  She is on room air oxygen.  No cardiopulmonary complaints.  Excellent oxygen saturation.  No diarrhea today.  Lab would not do GI PCR panel as per protocol she had been in the hospital greater than 48 hours when it was ordered.  She is not having any vomiting today.  She ate some jane this morning.  She looks stronger.  She feels ready for discharge.    Intake/Output Summary (Last 24 hours) at 12/24/2020 1126  Last data filed at 12/24/2020 1117  Gross per 24 hour   Intake --   Output 3200 ml   Net -3200 ml     Allergies:   Allergies   Allergen Reactions   • Eggs Or Egg-Derived Products Other (See Comments)     unspecified   • Onion Other (See Comments)     unspecified   • Tomato Other (See Comments)     unspecified   • Other Palpitations     Patient reports allergy to IV steriods     Current Scheduled Medications:   amitriptyline, 50 mg, Oral, Nightly  aspirin, 325 mg, Oral, Daily  cloNIDine, 0.3 mg, Oral, TID  dilTIAZem, 60 mg, Oral, Q8H  latanoprost, 1 drop, Both Eyes, Nightly  levothyroxine, 125 mcg, Oral, Q AM  losartan, 50 mg, Oral, Q24H  metoprolol tartrate, 100 mg, Oral, Q12H  pantoprazole, 40 mg, Oral, Q AM  sodium chloride, 10 mL, Intravenous, Q12H  sucralfate, 1 g, Oral, 4x Daily AC & at Bedtime      Current PRN Medications:  •  albuterol  •  calcium carbonate  •  HYDROmorphone **AND** naloxone  •  nitroglycerin  •  ondansetron **OR** ondansetron  •  promethazine  •  sodium chloride    Review of Systems see HPI    Vital Signs:  /81 (BP Location: Left arm, Patient Position: Lying)   Pulse 54   Temp 98.5 °F (36.9 °C)   Resp 16   Ht 160 cm (63\")   SpO2 98%   BMI 34.37 kg/m²     Physical " Exam  Vital signs - reviewed.  Line/IV site - No erythema, warmth, induration, or tenderness.    Lungs clear to auscultation no crackles  Abdomen is soft and nontender.  There was no guarding or rebound.  Bowel sounds were normal.    Lab Results:  CBC:   Results from last 7 days   Lab Units 12/24/20  0517 12/23/20  0538 12/21/20  0547 12/20/20  0626 12/19/20  1351   WBC 10*3/mm3 10.83* 10.56 11.29* 14.60* 18.38*   HEMOGLOBIN g/dL 11.8* 10.9* 10.6* 11.3* 12.4   HEMATOCRIT % 35.2 31.1* 31.2* 33.5* 34.8   PLATELETS 10*3/mm3 270 223 180 207 225     BMP:  Results from last 7 days   Lab Units 12/24/20  0517 12/23/20  0538 12/21/20  0547 12/20/20  0626 12/19/20  1351   SODIUM mmol/L 139 136 136 135* 137   POTASSIUM mmol/L 3.8 3.5 3.1* 3.2* 3.2*   CHLORIDE mmol/L 105 103 103 100 101   CO2 mmol/L 23.0 24.0 25.0 25.0 21.0*   BUN mg/dL 4* 5* 6 10 11   CREATININE mg/dL 0.67 0.69 0.71 0.98 0.89   GLUCOSE mg/dL 90 117* 118* 123* 127*   CALCIUM mg/dL 8.8 8.3* 8.1* 8.2* 9.0   ALT (SGPT) U/L  --  12 12 13 15     TSH 2.8  Free T4 1.5  C-reactive protein today 2.7 (improved from 13.83 days ago)  Ferritin 372      Culture Results:   Blood Culture   Date Value Ref Range Status   12/19/2020 No growth at 4 days  Preliminary     Urine Culture   Date Value Ref Range Status   12/20/2020 No growth  Final     Radiology: None  Additional Studies Reviewed: None    Impression:   She has not had fever.  She has been 72 hours without temperature elevation off antibiotic treatment.  White blood cell count remains essentially normal.  Blood cultures were no growth.  The nausea she had seems to be improved.  Her CRP which was elevated has shown significant improvement.  Do not have a definite diagnosis at this point, but she seems to be improving.  Feel it be reasonable to treat gastritis that was identified on her EGD and have her follow-up fever and markers of inflammation with her primary physician as an outpatient.    Recommendations:   Okay with  me for discharge home on no antibiotic treatment  Would suggest she follow-up with her primary care provider within the next week for repeat CBC, CRP, and symptom review.  At this point does not appear to have ongoing fever or ongoing infection  Would be happy to reassess if she does not continue to return to baseline or if symptoms recur/new symptoms develop  Otherwise follow-up with infectious diseases as needed    Moreno Boland MD

## 2020-12-24 NOTE — PROGRESS NOTES
"CC:\"im feeling better\"--she says her abd pain is bettter    Review of Systems   Constitutional: Positive for activity change.   HENT: Negative.    Eyes: Negative.    Respiratory: Negative.    Cardiovascular: Negative.    Gastrointestinal: Positive for abdominal pain.   Endocrine: Negative.    Genitourinary: Negative.    Musculoskeletal: Negative.    Skin: Negative.    Allergic/Immunologic: Negative.    Neurological: Negative.    Hematological: Negative.    Psychiatric/Behavioral: Negative.      Temp:  [98.2 °F (36.8 °C)-99.3 °F (37.4 °C)] 98.5 °F (36.9 °C)  Heart Rate:  [49-70] 54  Resp:  [16-18] 16  BP: (147-160)/(74-90) 150/81  I/O last 3 completed shifts:  In: 1986 [I.V.:1886; IV Piggyback:100]  Out: 4800 [Urine:4800]  I/O this shift:  In: -   Out: 700 [Urine:700]    Physical Exam  Vitals signs and nursing note reviewed.   Constitutional:       Appearance: Normal appearance.   HENT:      Head: Normocephalic and atraumatic.      Nose: Nose normal.      Mouth/Throat:      Mouth: Mucous membranes are moist.      Pharynx: Oropharynx is clear.   Eyes:      Extraocular Movements: Extraocular movements intact.      Conjunctiva/sclera: Conjunctivae normal.      Pupils: Pupils are equal, round, and reactive to light.   Neck:      Musculoskeletal: Normal range of motion and neck supple.   Cardiovascular:      Rate and Rhythm: Normal rate and regular rhythm.      Pulses: Normal pulses.      Heart sounds: Normal heart sounds.   Pulmonary:      Effort: Pulmonary effort is normal.      Breath sounds: Normal breath sounds.   Abdominal:      General: Abdomen is flat. Bowel sounds are normal.      Palpations: Abdomen is soft.   Musculoskeletal: Normal range of motion.   Skin:     General: Skin is warm and dry.      Capillary Refill: Capillary refill takes less than 2 seconds.   Neurological:      General: No focal deficit present.      Mental Status: She is alert and oriented to person, place, and time. Mental status is at " baseline.   Psychiatric:         Behavior: Behavior normal.         Thought Content: Thought content normal.           Pain of upper abdomen    Flank pain    Appreciate help of all--she says her abd pain is better--her temp is down--home when ok with others

## 2020-12-24 NOTE — PROGRESS NOTES
"CC:\"im so sick\"--notes vomiting 5 times today    Review of Systems   Constitutional: Positive for activity change. Negative for fever.   HENT: Negative.    Eyes: Negative.    Respiratory: Negative.    Cardiovascular: Negative.    Gastrointestinal: Positive for abdominal pain, nausea and vomiting.   Endocrine: Negative.    Genitourinary: Negative.    Musculoskeletal: Negative.    Skin: Negative.    Allergic/Immunologic: Negative.    Neurological: Negative.    Hematological: Negative.    Psychiatric/Behavioral: Negative.      Temp:  [98.2 °F (36.8 °C)-99.3 °F (37.4 °C)] 98.5 °F (36.9 °C)  Heart Rate:  [49-70] 52  Resp:  [16-18] 16  BP: (147-160)/(74-90) 150/81  I/O last 3 completed shifts:  In: 1986 [I.V.:1886; IV Piggyback:100]  Out: 4800 [Urine:4800]  No intake/output data recorded.    Physical Exam  Vitals signs and nursing note reviewed.   Constitutional:       Appearance: Normal appearance.   HENT:      Head: Normocephalic and atraumatic.      Nose: Nose normal.      Mouth/Throat:      Mouth: Mucous membranes are moist.      Pharynx: Oropharynx is clear.   Eyes:      Extraocular Movements: Extraocular movements intact.      Conjunctiva/sclera: Conjunctivae normal.      Pupils: Pupils are equal, round, and reactive to light.   Neck:      Musculoskeletal: Normal range of motion and neck supple.   Cardiovascular:      Rate and Rhythm: Normal rate and regular rhythm.      Pulses: Normal pulses.      Heart sounds: Normal heart sounds.   Pulmonary:      Effort: Pulmonary effort is normal.      Breath sounds: Normal breath sounds.   Abdominal:      General: Abdomen is flat. Bowel sounds are normal.      Palpations: Abdomen is soft.   Musculoskeletal: Normal range of motion.   Skin:     General: Skin is warm and dry.      Capillary Refill: Capillary refill takes less than 2 seconds.   Neurological:      General: No focal deficit present.      Mental Status: She is alert and oriented to person, place, and time. Mental " status is at baseline.   Psychiatric:         Mood and Affect: Mood normal.         Behavior: Behavior normal.         Thought Content: Thought content normal.           Pain of upper abdomen    Flank pain      Still ill with vomiting and abd pain---appreciate dr romo input and recs

## 2020-12-24 NOTE — PLAN OF CARE
Goal Outcome Evaluation:  Plan of Care Reviewed With: patient  Progress: no change  Outcome Summary: Initial RDN eval. Pt on regular diet with PO intake 50%/1 meal. Per documentation continues to have c/o abd pain, nausea, and vomitting. Added Boost plus daily to promote adequate kcal/protein intake. Will continue to follow.

## 2020-12-24 NOTE — NURSING NOTE
Pt wretching loudly. Verbalizes vomiting a lot. Observed 1 emesis bag that had <30cc in it and it was saliva appearance. Skin dry and intact, color good. Able to carry on conversation about food. Requested nausea med. Instructed it is too early for her nausea med and that I have ordered the next dose from pharmacy.

## 2020-12-24 NOTE — PROGRESS NOTES
She has been afebrile.  Still having some mild left-sided abdominal pain.  Seems about the same versus slightly improved even.  Again this does not appear to be surgical in nature.  I am off this weekend.  Call.  If problems arise.

## 2020-12-24 NOTE — PROGRESS NOTES
Discharge Planning Assessment  [facility]     Patient Name: Leandro Clay  MRN: [id number]  Today's Date: 12/24/2020    Admit Date: 12/19/2020    Discharge Needs Assessment     Row Name 12/24/20 1034       Living Environment    Lives With  spouse    Current Living Arrangements  home/apartment/condo    Primary Care Provided by  self    Provides Primary Care For  spouse    Quality of Family Relationships  helpful;supportive    Able to Return to Prior Arrangements  yes       Resource/Environmental Concerns    Resource/Environmental Concerns  none    Transportation Concerns  car, none       Transition Planning    Patient/Family Anticipates Transition to  home    Patient/Family Anticipated Services at Transition  none    Transportation Anticipated  car, drives self       Discharge Needs Assessment    Equipment Currently Used at Home  none    Anticipated Changes Related to Illness  none    Equipment Needed After Discharge  none    Provided Post Acute Provider List?  N/A    Provided Post Acute Provider Quality & Resource List?  N/A    Discharge Coordination/Progress  TALKED WITH LEANDRO CLAY AND SHE HAS NO CONCERNS OR NEEDS AT THIS TIME. WILL CONTINUE TO FOLLOW IF ANYTHING IS NEEDED. ALEK REDNON RN  12/24 @ 1040        Discharge Plan    No documentation.       Continued Care and Services - Admitted Since 12/19/2020    Coordination has not been started for this encounter.         Demographic Summary    No documentation.       Functional Status    No documentation.       Psychosocial    No documentation.       Abuse/Neglect    No documentation.       Legal    No documentation.       Substance Abuse    No documentation.       Patient Forms    No documentation.           Alek Rendon RN

## 2020-12-25 ENCOUNTER — READMISSION MANAGEMENT (OUTPATIENT)
Dept: CALL CENTER | Facility: HOSPITAL | Age: 50
End: 2020-12-25

## 2020-12-25 NOTE — OUTREACH NOTE
Prep Survey      Responses   [facility] patient discharged from?  [place]   Is LACE score < 7 ?  No   Emergency Room discharge w/ pulse ox?  No   Eligibility  Readm Mgmt   Discharge diagnosis  Pain of upper abdomen [s/p ]   Does the patient have one of the following disease processes/diagnoses(primary or secondary)?  Other   Does the patient have Home health ordered?  No   Is there a DME ordered?  No   Comments regarding appointments  Per AVS   Prep survey completed?  Yes          Yeni Nelson RN

## 2020-12-26 NOTE — DISCHARGE SUMMARY
"[facility]   DISCHARGE SUMMARY       Date of Admission: 12/19/2020  Date of Discharge:  12/24/2020  Primary Care Physician: Yoko Smith APRN    Presenting Problem/History of Present Illness:  Flank pain [R10.9]  Pain of upper abdomen [R10.10]     Final Discharge Diagnoses:  Active Hospital Problems    Diagnosis   • **Pain of upper abdomen     Added automatically from request for surgery [id number]     • Flank pain       Consults: inf dz, gen surgery, urology, gastro    Procedures Performed: upper gi endo    Pertinent Test Results: see chart    Chief Complaint on Day of Discharge: recent nausea, vomiting and flank pain improved and temp resolved     History of Present Illness on Day of Discharge: as above     Hospital Course:  The patient is a 50 y.o. female who presented to [facility] with left flank, abd pain, fever for several weeks. Ct at [facility] was suspicious for left renal issue and she was transferred here for urology input as she does have hx of renal stones. Please see urology consult. She continued with flank and abd pain and nausea/vomiting and was seen by surgery and gastro. She underwent an upper gi endo which was unrevealing. We added carafate which seemed to imprve her symptoms. Her temp resolved and dr rodriguez was consulted regarding this..      Condition on Discharge:  Stable     Physical Exam on Discharge:  /85   Pulse 65   Temp 98.5 °F (36.9 °C)   Resp 16   Ht 160 cm (63\")   SpO2 98%   BMI 34.37 kg/m²   Physical Exam  Vitals signs and nursing note reviewed.   Constitutional:       Appearance: Normal appearance.   HENT:      Head: Normocephalic and atraumatic.   Neck:      Musculoskeletal: Normal range of motion and neck supple.   Cardiovascular:      Rate and Rhythm: Normal rate.      Pulses: Normal pulses.      Heart sounds: Normal heart sounds.   Pulmonary:      Effort: Pulmonary effort is normal.      Breath sounds: Normal breath sounds.   Abdominal:      " General: Abdomen is flat. Bowel sounds are normal.      Palpations: Abdomen is soft.   Neurological:      Mental Status: She is alert.           Discharge Disposition:  Home or Self Care    Discharge Medications:     Discharge Medications      New Medications      Instructions Start Date   pantoprazole 40 MG EC tablet  Commonly known as: PROTONIX   40 mg, Oral, Daily      sucralfate 1 g tablet  Commonly known as: CARAFATE   1 g, Oral, 4 Times Daily         Continue These Medications      Instructions Start Date   albuterol sulfate  (90 Base) MCG/ACT inhaler  Commonly known as: PROVENTIL HFA;VENTOLIN HFA;PROAIR HFA   2 puffs, Inhalation, Every 4 Hours PRN      amitriptyline 50 MG tablet  Commonly known as: ELAVIL   50 mg, Oral, Nightly      aspirin 325 MG tablet   325 mg, Oral, Daily      cloNIDine 0.3 MG tablet  Commonly known as: CATAPRES   0.3 mg, Oral, 3 Times Daily      COZAAR PO   Oral      cyclobenzaprine 10 MG tablet  Commonly known as: FLEXERIL   10 mg, Oral, 3 Times Daily PRN      dilTIAZem 60 MG tablet  Commonly known as: CARDIZEM   60 mg, Oral, 2 Times Daily      ketorolac 10 MG tablet  Commonly known as: TORADOL   10 mg, Oral, Every 6 Hours PRN      latanoprost 0.005 % ophthalmic solution  Commonly known as: XALATAN   1 drop, Nightly      levothyroxine 125 MCG tablet  Commonly known as: SYNTHROID, LEVOTHROID   125 mcg, Oral, Daily      metoprolol tartrate 100 MG tablet  Commonly known as: LOPRESSOR   100 mg, Oral, 2 Times Daily      Nitrostat 0.4 MG SL tablet  Generic drug: nitroglycerin   0.4 mg, Sublingual, Every 5 Minutes PRN, Take no more than 3 doses in 15 minutes.       polyethylene glycol-electrolytes 420 g solution  Commonly known as: Nulytely with Flavor Packs   4,000 mL, Oral, See Admin Instructions      Zofran 8 MG tablet  Generic drug: ondansetron   8 mg, Oral, Every 8 Hours PRN             Discharge Diet:     Activity at Discharge:   As tolerated  Discharge Care Plan/Instructions:  [facility] meds and followup with her pcp for repeat cbc cmp and urinalysis and culture next week    Follow-up Appointments:   No future appointments.    Test Results Pending at Discharge: none    Devonte Espitia MD  12/26/20  06:29 CST    Time: 6:35am

## 2020-12-28 ENCOUNTER — APPOINTMENT (OUTPATIENT)
Dept: ULTRASOUND IMAGING | Facility: HOSPITAL | Age: 50
End: 2020-12-28

## 2020-12-28 ENCOUNTER — HOSPITAL ENCOUNTER (OUTPATIENT)
Facility: HOSPITAL | Age: 50
Setting detail: OBSERVATION
Discharge: HOME | End: 2020-12-30
Attending: EMERGENCY MEDICINE | Admitting: FAMILY MEDICINE

## 2020-12-28 DIAGNOSIS — I82.621 ARM DVT (DEEP VENOUS THROMBOEMBOLISM), ACUTE, RIGHT (HCC): Primary | ICD-10-CM

## 2020-12-28 LAB
APTT PPP: 27.3 SECONDS (ref 24.1–35)
BASOPHILS # BLD AUTO: 0.09 10*3/MM3 (ref 0–0.2)
BASOPHILS NFR BLD AUTO: 0.5 % (ref 0–1.5)
DEPRECATED RDW RBC AUTO: 42.6 FL (ref 37–54)
EOSINOPHIL # BLD AUTO: 1.29 10*3/MM3 (ref 0–0.4)
EOSINOPHIL NFR BLD AUTO: 6.8 % (ref 0.3–6.2)
ERYTHROCYTE [DISTWIDTH] IN BLOOD BY AUTOMATED COUNT: 13.8 % (ref 12.3–15.4)
HCT VFR BLD AUTO: 37.7 % (ref 34–46.6)
HGB BLD-MCNC: 12.7 G/DL (ref 12–15.9)
IMM GRANULOCYTES # BLD AUTO: 0.42 10*3/MM3 (ref 0–0.05)
IMM GRANULOCYTES NFR BLD AUTO: 2.2 % (ref 0–0.5)
INR PPP: 1.01 (ref 0.91–1.09)
LYMPHOCYTES # BLD AUTO: 2.89 10*3/MM3 (ref 0.7–3.1)
LYMPHOCYTES NFR BLD AUTO: 15.3 % (ref 19.6–45.3)
MCH RBC QN AUTO: 28.9 PG (ref 26.6–33)
MCHC RBC AUTO-ENTMCNC: 33.7 G/DL (ref 31.5–35.7)
MCV RBC AUTO: 85.9 FL (ref 79–97)
MONOCYTES # BLD AUTO: 1.07 10*3/MM3 (ref 0.1–0.9)
MONOCYTES NFR BLD AUTO: 5.7 % (ref 5–12)
NEUTROPHILS NFR BLD AUTO: 13.14 10*3/MM3 (ref 1.7–7)
NEUTROPHILS NFR BLD AUTO: 69.5 % (ref 42.7–76)
NRBC BLD AUTO-RTO: 0 /100 WBC (ref 0–0.2)
PLATELET # BLD AUTO: 434 10*3/MM3 (ref 140–450)
PMV BLD AUTO: 10.4 FL (ref 6–12)
PROTHROMBIN TIME: 12.9 SECONDS (ref 11.9–14.6)
RBC # BLD AUTO: 4.39 10*6/MM3 (ref 3.77–5.28)
WBC # BLD AUTO: 18.9 10*3/MM3 (ref 3.4–10.8)

## 2020-12-28 PROCEDURE — 99284 EMERGENCY DEPT VISIT MOD MDM: CPT

## 2020-12-28 PROCEDURE — 85025 COMPLETE CBC W/AUTO DIFF WBC: CPT | Performed by: EMERGENCY MEDICINE

## 2020-12-28 PROCEDURE — 25010000002 ONDANSETRON PER 1 MG: Performed by: EMERGENCY MEDICINE

## 2020-12-28 PROCEDURE — 80053 COMPREHEN METABOLIC PANEL: CPT | Performed by: EMERGENCY MEDICINE

## 2020-12-28 PROCEDURE — 96372 THER/PROPH/DIAG INJ SC/IM: CPT

## 2020-12-28 PROCEDURE — 25010000002 ENOXAPARIN PER 10 MG: Performed by: EMERGENCY MEDICINE

## 2020-12-28 PROCEDURE — 85610 PROTHROMBIN TIME: CPT | Performed by: EMERGENCY MEDICINE

## 2020-12-28 PROCEDURE — G0378 HOSPITAL OBSERVATION PER HR: HCPCS

## 2020-12-28 PROCEDURE — 85730 THROMBOPLASTIN TIME PARTIAL: CPT | Performed by: EMERGENCY MEDICINE

## 2020-12-28 PROCEDURE — 36415 COLL VENOUS BLD VENIPUNCTURE: CPT

## 2020-12-28 PROCEDURE — 25010000003 HYDROMORPHONE 1 MG/ML SOLUTION: Performed by: EMERGENCY MEDICINE

## 2020-12-28 PROCEDURE — 93971 EXTREMITY STUDY: CPT

## 2020-12-28 PROCEDURE — 96375 TX/PRO/DX INJ NEW DRUG ADDON: CPT

## 2020-12-28 PROCEDURE — 87635 SARS-COV-2 COVID-19 AMP PRB: CPT | Performed by: EMERGENCY MEDICINE

## 2020-12-28 PROCEDURE — 93971 EXTREMITY STUDY: CPT | Performed by: SURGERY

## 2020-12-28 PROCEDURE — C9803 HOPD COVID-19 SPEC COLLECT: HCPCS

## 2020-12-28 RX ORDER — ONDANSETRON 2 MG/ML
4 INJECTION INTRAMUSCULAR; INTRAVENOUS ONCE
Status: COMPLETED | OUTPATIENT
Start: 2020-12-28 | End: 2020-12-28

## 2020-12-28 RX ORDER — SODIUM CHLORIDE 0.9 % (FLUSH) 0.9 %
10 SYRINGE (ML) INJECTION AS NEEDED
Status: DISCONTINUED | OUTPATIENT
Start: 2020-12-28 | End: 2020-12-30 | Stop reason: HOSPADM

## 2020-12-28 RX ADMIN — ONDANSETRON HYDROCHLORIDE 4 MG: 2 SOLUTION INTRAMUSCULAR; INTRAVENOUS at 23:19

## 2020-12-28 RX ADMIN — HYDROMORPHONE HYDROCHLORIDE 1 MG: 1 INJECTION, SOLUTION INTRAMUSCULAR; INTRAVENOUS; SUBCUTANEOUS at 23:19

## 2020-12-28 RX ADMIN — ENOXAPARIN SODIUM 90 MG: 100 INJECTION SUBCUTANEOUS at 23:19

## 2020-12-29 ENCOUNTER — READMISSION MANAGEMENT (OUTPATIENT)
Dept: CALL CENTER | Facility: HOSPITAL | Age: 50
End: 2020-12-29

## 2020-12-29 LAB
ALBUMIN SERPL-MCNC: 4.2 G/DL (ref 3.5–5.2)
ALBUMIN/GLOB SERPL: 1.3 G/DL
ALP SERPL-CCNC: 72 U/L (ref 39–117)
ALT SERPL W P-5'-P-CCNC: 13 U/L (ref 1–33)
ANION GAP SERPL CALCULATED.3IONS-SCNC: 14 MMOL/L (ref 5–15)
AST SERPL-CCNC: 17 U/L (ref 1–32)
BILIRUB SERPL-MCNC: 0.3 MG/DL (ref 0–1.2)
BUN SERPL-MCNC: 9 MG/DL (ref 6–20)
BUN/CREAT SERPL: 15.5 (ref 7–25)
CALCIUM SPEC-SCNC: 9 MG/DL (ref 8.6–10.5)
CHLORIDE SERPL-SCNC: 100 MMOL/L (ref 98–107)
CO2 SERPL-SCNC: 24 MMOL/L (ref 22–29)
CREAT SERPL-MCNC: 0.58 MG/DL (ref 0.57–1)
GFR SERPL CREATININE-BSD FRML MDRD: 109 ML/MIN/1.73
GLOBULIN UR ELPH-MCNC: 3.2 GM/DL
GLUCOSE SERPL-MCNC: 188 MG/DL (ref 65–99)
POTASSIUM SERPL-SCNC: 3.8 MMOL/L (ref 3.5–5.2)
PROT SERPL-MCNC: 7.4 G/DL (ref 6–8.5)
SARS-COV-2 RNA PNL SPEC NAA+PROBE: DETECTED
SODIUM SERPL-SCNC: 138 MMOL/L (ref 136–145)

## 2020-12-29 PROCEDURE — 96365 THER/PROPH/DIAG IV INF INIT: CPT

## 2020-12-29 PROCEDURE — 96376 TX/PRO/DX INJ SAME DRUG ADON: CPT

## 2020-12-29 PROCEDURE — G0378 HOSPITAL OBSERVATION PER HR: HCPCS

## 2020-12-29 PROCEDURE — 99218 PR INITIAL OBSERVATION CARE/DAY 30 MINUTES: CPT | Performed by: FAMILY MEDICINE

## 2020-12-29 PROCEDURE — 25010000002 ONDANSETRON PER 1 MG: Performed by: FAMILY MEDICINE

## 2020-12-29 RX ORDER — AMITRIPTYLINE HYDROCHLORIDE 25 MG/1
50 TABLET, FILM COATED ORAL NIGHTLY
Status: DISCONTINUED | OUTPATIENT
Start: 2020-12-29 | End: 2020-12-29

## 2020-12-29 RX ORDER — NITROGLYCERIN 0.4 MG/1
0.4 TABLET SUBLINGUAL
Status: DISCONTINUED | OUTPATIENT
Start: 2020-12-29 | End: 2020-12-30 | Stop reason: HOSPADM

## 2020-12-29 RX ORDER — METOPROLOL TARTRATE 100 MG/1
100 TABLET ORAL EVERY 12 HOURS SCHEDULED
Status: DISCONTINUED | OUTPATIENT
Start: 2020-12-29 | End: 2020-12-30 | Stop reason: HOSPADM

## 2020-12-29 RX ORDER — SODIUM CHLORIDE 0.9 % (FLUSH) 0.9 %
10 SYRINGE (ML) INJECTION AS NEEDED
Status: DISCONTINUED | OUTPATIENT
Start: 2020-12-29 | End: 2020-12-30 | Stop reason: HOSPADM

## 2020-12-29 RX ORDER — HYDROCODONE BITARTRATE AND ACETAMINOPHEN 7.5; 325 MG/1; MG/1
1 TABLET ORAL EVERY 4 HOURS PRN
Status: DISCONTINUED | OUTPATIENT
Start: 2020-12-29 | End: 2020-12-30 | Stop reason: HOSPADM

## 2020-12-29 RX ORDER — DILTIAZEM HYDROCHLORIDE 120 MG/1
120 CAPSULE, COATED, EXTENDED RELEASE ORAL
Status: DISCONTINUED | OUTPATIENT
Start: 2020-12-29 | End: 2020-12-29

## 2020-12-29 RX ORDER — ONDANSETRON 2 MG/ML
4 INJECTION INTRAMUSCULAR; INTRAVENOUS EVERY 6 HOURS PRN
Status: DISCONTINUED | OUTPATIENT
Start: 2020-12-29 | End: 2020-12-30 | Stop reason: HOSPADM

## 2020-12-29 RX ORDER — LEVOTHYROXINE SODIUM 0.12 MG/1
125 TABLET ORAL
Status: DISCONTINUED | OUTPATIENT
Start: 2020-12-29 | End: 2020-12-30 | Stop reason: HOSPADM

## 2020-12-29 RX ORDER — CLONIDINE HYDROCHLORIDE 0.1 MG/1
0.1 TABLET ORAL 3 TIMES DAILY
Status: DISCONTINUED | OUTPATIENT
Start: 2020-12-29 | End: 2020-12-30 | Stop reason: HOSPADM

## 2020-12-29 RX ORDER — PANTOPRAZOLE SODIUM 40 MG/1
40 TABLET, DELAYED RELEASE ORAL
Status: DISCONTINUED | OUTPATIENT
Start: 2020-12-29 | End: 2020-12-30 | Stop reason: HOSPADM

## 2020-12-29 RX ORDER — CYCLOBENZAPRINE HCL 10 MG
10 TABLET ORAL 3 TIMES DAILY PRN
Status: DISCONTINUED | OUTPATIENT
Start: 2020-12-29 | End: 2020-12-30 | Stop reason: HOSPADM

## 2020-12-29 RX ORDER — ALBUTEROL SULFATE 2.5 MG/3ML
2.5 SOLUTION RESPIRATORY (INHALATION) EVERY 6 HOURS PRN
Status: DISCONTINUED | OUTPATIENT
Start: 2020-12-29 | End: 2020-12-30 | Stop reason: HOSPADM

## 2020-12-29 RX ORDER — METOPROLOL TARTRATE 100 MG/1
100 TABLET ORAL EVERY 12 HOURS SCHEDULED
Status: DISCONTINUED | OUTPATIENT
Start: 2020-12-29 | End: 2020-12-29

## 2020-12-29 RX ORDER — DILTIAZEM HYDROCHLORIDE 60 MG/1
60 TABLET, FILM COATED ORAL EVERY 8 HOURS SCHEDULED
Status: DISCONTINUED | OUTPATIENT
Start: 2020-12-29 | End: 2020-12-30 | Stop reason: HOSPADM

## 2020-12-29 RX ORDER — SUCRALFATE 1 G/1
1 TABLET ORAL 4 TIMES DAILY
Status: DISCONTINUED | OUTPATIENT
Start: 2020-12-29 | End: 2020-12-30 | Stop reason: HOSPADM

## 2020-12-29 RX ORDER — CLINDAMYCIN PHOSPHATE 600 MG/50ML
600 INJECTION INTRAVENOUS EVERY 6 HOURS
Status: DISCONTINUED | OUTPATIENT
Start: 2020-12-29 | End: 2020-12-30 | Stop reason: HOSPADM

## 2020-12-29 RX ORDER — AMITRIPTYLINE HYDROCHLORIDE 25 MG/1
50 TABLET, FILM COATED ORAL NIGHTLY
Status: DISCONTINUED | OUTPATIENT
Start: 2020-12-29 | End: 2020-12-30 | Stop reason: HOSPADM

## 2020-12-29 RX ORDER — SODIUM CHLORIDE 0.9 % (FLUSH) 0.9 %
10 SYRINGE (ML) INJECTION EVERY 12 HOURS SCHEDULED
Status: DISCONTINUED | OUTPATIENT
Start: 2020-12-29 | End: 2020-12-30 | Stop reason: HOSPADM

## 2020-12-29 RX ORDER — LOSARTAN POTASSIUM 50 MG/1
100 TABLET ORAL
Status: DISCONTINUED | OUTPATIENT
Start: 2020-12-29 | End: 2020-12-30 | Stop reason: HOSPADM

## 2020-12-29 RX ORDER — HYDROCHLOROTHIAZIDE 12.5 MG/1
12.5 TABLET ORAL DAILY
COMMUNITY

## 2020-12-29 RX ADMIN — METOPROLOL TARTRATE 100 MG: 100 TABLET ORAL at 20:41

## 2020-12-29 RX ADMIN — AMITRIPTYLINE HYDROCHLORIDE 50 MG: 25 TABLET, FILM COATED ORAL at 20:43

## 2020-12-29 RX ADMIN — HYDROCODONE BITARTRATE AND ACETAMINOPHEN 1 TABLET: 7.5; 325 TABLET ORAL at 02:41

## 2020-12-29 RX ADMIN — HYDROCODONE BITARTRATE AND ACETAMINOPHEN 1 TABLET: 7.5; 325 TABLET ORAL at 08:12

## 2020-12-29 RX ADMIN — CLINDAMYCIN IN 5 PERCENT DEXTROSE 600 MG: 12 INJECTION, SOLUTION INTRAVENOUS at 03:24

## 2020-12-29 RX ADMIN — APIXABAN 10 MG: 5 TABLET, FILM COATED ORAL at 08:12

## 2020-12-29 RX ADMIN — DILTIAZEM HYDROCHLORIDE 60 MG: 60 TABLET, FILM COATED ORAL at 14:29

## 2020-12-29 RX ADMIN — HYDROCODONE BITARTRATE AND ACETAMINOPHEN 1 TABLET: 7.5; 325 TABLET ORAL at 17:18

## 2020-12-29 RX ADMIN — SUCRALFATE 1 G: 1 TABLET ORAL at 17:18

## 2020-12-29 RX ADMIN — LOSARTAN POTASSIUM 100 MG: 50 TABLET, FILM COATED ORAL at 08:12

## 2020-12-29 RX ADMIN — SUCRALFATE 1 G: 1 TABLET ORAL at 12:49

## 2020-12-29 RX ADMIN — CLONIDINE HYDROCHLORIDE 0.3 MG: 0.1 TABLET ORAL at 08:13

## 2020-12-29 RX ADMIN — SUCRALFATE 1 G: 1 TABLET ORAL at 20:42

## 2020-12-29 RX ADMIN — SODIUM CHLORIDE, PRESERVATIVE FREE 10 ML: 5 INJECTION INTRAVENOUS at 20:42

## 2020-12-29 RX ADMIN — DILTIAZEM HYDROCHLORIDE 60 MG: 60 TABLET, FILM COATED ORAL at 08:12

## 2020-12-29 RX ADMIN — CLONIDINE HYDROCHLORIDE 0.3 MG: 0.2 TABLET ORAL at 03:25

## 2020-12-29 RX ADMIN — AMITRIPTYLINE HYDROCHLORIDE 50 MG: 25 TABLET, FILM COATED ORAL at 03:25

## 2020-12-29 RX ADMIN — LEVOTHYROXINE SODIUM 125 MCG: 125 TABLET ORAL at 08:12

## 2020-12-29 RX ADMIN — CLINDAMYCIN IN 5 PERCENT DEXTROSE 600 MG: 12 INJECTION, SOLUTION INTRAVENOUS at 14:29

## 2020-12-29 RX ADMIN — ONDANSETRON HYDROCHLORIDE 4 MG: 2 SOLUTION INTRAMUSCULAR; INTRAVENOUS at 20:42

## 2020-12-29 RX ADMIN — HYDROCODONE BITARTRATE AND ACETAMINOPHEN 1 TABLET: 7.5; 325 TABLET ORAL at 12:51

## 2020-12-29 RX ADMIN — METOPROLOL TARTRATE 100 MG: 100 TABLET ORAL at 08:13

## 2020-12-29 RX ADMIN — APIXABAN 10 MG: 5 TABLET, FILM COATED ORAL at 20:41

## 2020-12-29 RX ADMIN — CLONIDINE HYDROCHLORIDE 0.1 MG: 0.1 TABLET ORAL at 20:41

## 2020-12-29 RX ADMIN — CLINDAMYCIN IN 5 PERCENT DEXTROSE 600 MG: 12 INJECTION, SOLUTION INTRAVENOUS at 08:13

## 2020-12-29 RX ADMIN — CLINDAMYCIN IN 5 PERCENT DEXTROSE 600 MG: 12 INJECTION, SOLUTION INTRAVENOUS at 20:42

## 2020-12-29 RX ADMIN — DILTIAZEM HYDROCHLORIDE 120 MG: 120 CAPSULE, COATED, EXTENDED RELEASE ORAL at 03:25

## 2020-12-29 RX ADMIN — PANTOPRAZOLE SODIUM 40 MG: 40 TABLET, DELAYED RELEASE ORAL at 08:12

## 2020-12-29 RX ADMIN — METOPROLOL TARTRATE 100 MG: 100 TABLET ORAL at 03:25

## 2020-12-29 NOTE — OUTREACH NOTE
Medical Week 1 Survey      Responses   [facility] patient discharged from?  [place]   Does the patient have one of the following disease processes/diagnoses(primary or secondary)?  Other   Week 1 attempt successful?  No   Unsuccessful attempts  Attempt 1   Revoke  Readmitted          Tierra Mccabe RN

## 2020-12-30 ENCOUNTER — READMISSION MANAGEMENT (OUTPATIENT)
Dept: CALL CENTER | Facility: HOSPITAL | Age: 50
End: 2020-12-30

## 2020-12-30 VITALS
TEMPERATURE: 98.8 F | WEIGHT: 203.8 LBS | DIASTOLIC BLOOD PRESSURE: 86 MMHG | HEIGHT: 63 IN | BODY MASS INDEX: 36.11 KG/M2 | HEART RATE: 59 BPM | RESPIRATION RATE: 18 BRPM | OXYGEN SATURATION: 96 % | SYSTOLIC BLOOD PRESSURE: 153 MMHG

## 2020-12-30 LAB
ALBUMIN SERPL-MCNC: 3.7 G/DL (ref 3.5–5.2)
ALBUMIN/GLOB SERPL: 1.2 G/DL
ALP SERPL-CCNC: 67 U/L (ref 39–117)
ALT SERPL W P-5'-P-CCNC: 11 U/L (ref 1–33)
ANION GAP SERPL CALCULATED.3IONS-SCNC: 9 MMOL/L (ref 5–15)
AST SERPL-CCNC: 16 U/L (ref 1–32)
BASOPHILS # BLD AUTO: 0.04 10*3/MM3 (ref 0–0.2)
BASOPHILS NFR BLD AUTO: 0.4 % (ref 0–1.5)
BILIRUB SERPL-MCNC: 0.4 MG/DL (ref 0–1.2)
BUN SERPL-MCNC: 14 MG/DL (ref 6–20)
BUN/CREAT SERPL: 18.2 (ref 7–25)
CALCIUM SPEC-SCNC: 9.1 MG/DL (ref 8.6–10.5)
CHLORIDE SERPL-SCNC: 100 MMOL/L (ref 98–107)
CO2 SERPL-SCNC: 26 MMOL/L (ref 22–29)
CREAT SERPL-MCNC: 0.77 MG/DL (ref 0.57–1)
DEPRECATED RDW RBC AUTO: 44.9 FL (ref 37–54)
EOSINOPHIL # BLD AUTO: 1.04 10*3/MM3 (ref 0–0.4)
EOSINOPHIL NFR BLD AUTO: 9.9 % (ref 0.3–6.2)
ERYTHROCYTE [DISTWIDTH] IN BLOOD BY AUTOMATED COUNT: 14.1 % (ref 12.3–15.4)
GFR SERPL CREATININE-BSD FRML MDRD: 79 ML/MIN/1.73
GLOBULIN UR ELPH-MCNC: 3.1 GM/DL
GLUCOSE SERPL-MCNC: 99 MG/DL (ref 65–99)
HCT VFR BLD AUTO: 34.2 % (ref 34–46.6)
HGB BLD-MCNC: 11.2 G/DL (ref 12–15.9)
IMM GRANULOCYTES # BLD AUTO: 0.14 10*3/MM3 (ref 0–0.05)
IMM GRANULOCYTES NFR BLD AUTO: 1.3 % (ref 0–0.5)
LYMPHOCYTES # BLD AUTO: 3.58 10*3/MM3 (ref 0.7–3.1)
LYMPHOCYTES NFR BLD AUTO: 34.1 % (ref 19.6–45.3)
MCH RBC QN AUTO: 28.7 PG (ref 26.6–33)
MCHC RBC AUTO-ENTMCNC: 32.7 G/DL (ref 31.5–35.7)
MCV RBC AUTO: 87.7 FL (ref 79–97)
MONOCYTES # BLD AUTO: 0.74 10*3/MM3 (ref 0.1–0.9)
MONOCYTES NFR BLD AUTO: 7 % (ref 5–12)
NEUTROPHILS NFR BLD AUTO: 4.96 10*3/MM3 (ref 1.7–7)
NEUTROPHILS NFR BLD AUTO: 47.3 % (ref 42.7–76)
NRBC BLD AUTO-RTO: 0 /100 WBC (ref 0–0.2)
PLATELET # BLD AUTO: 332 10*3/MM3 (ref 140–450)
PMV BLD AUTO: 10.6 FL (ref 6–12)
POTASSIUM SERPL-SCNC: 4.1 MMOL/L (ref 3.5–5.2)
PROT SERPL-MCNC: 6.8 G/DL (ref 6–8.5)
RBC # BLD AUTO: 3.9 10*6/MM3 (ref 3.77–5.28)
SODIUM SERPL-SCNC: 135 MMOL/L (ref 136–145)
WBC # BLD AUTO: 10.5 10*3/MM3 (ref 3.4–10.8)

## 2020-12-30 PROCEDURE — 96372 THER/PROPH/DIAG INJ SC/IM: CPT

## 2020-12-30 PROCEDURE — 85025 COMPLETE CBC W/AUTO DIFF WBC: CPT | Performed by: FAMILY MEDICINE

## 2020-12-30 PROCEDURE — 25010000002 ONDANSETRON PER 1 MG: Performed by: FAMILY MEDICINE

## 2020-12-30 PROCEDURE — 25010000002 KETOROLAC TROMETHAMINE PER 15 MG: Performed by: FAMILY MEDICINE

## 2020-12-30 PROCEDURE — 96376 TX/PRO/DX INJ SAME DRUG ADON: CPT

## 2020-12-30 PROCEDURE — 99217 PR OBSERVATION CARE DISCHARGE MANAGEMENT: CPT | Performed by: FAMILY MEDICINE

## 2020-12-30 PROCEDURE — 96366 THER/PROPH/DIAG IV INF ADDON: CPT

## 2020-12-30 PROCEDURE — 80053 COMPREHEN METABOLIC PANEL: CPT | Performed by: FAMILY MEDICINE

## 2020-12-30 PROCEDURE — G0378 HOSPITAL OBSERVATION PER HR: HCPCS

## 2020-12-30 RX ORDER — CLINDAMYCIN HYDROCHLORIDE 300 MG/1
300 CAPSULE ORAL 4 TIMES DAILY
Qty: 28 CAPSULE | Refills: 0 | Status: SHIPPED | OUTPATIENT
Start: 2020-12-30

## 2020-12-30 RX ORDER — KETOROLAC TROMETHAMINE 10 MG/1
10 TABLET, FILM COATED ORAL EVERY 6 HOURS PRN
Qty: 16 TABLET | Refills: 0 | Status: SHIPPED | OUTPATIENT
Start: 2020-12-30

## 2020-12-30 RX ORDER — KETOROLAC TROMETHAMINE 30 MG/ML
60 INJECTION, SOLUTION INTRAMUSCULAR; INTRAVENOUS ONCE
Status: COMPLETED | OUTPATIENT
Start: 2020-12-30 | End: 2020-12-30

## 2020-12-30 RX ADMIN — CLINDAMYCIN IN 5 PERCENT DEXTROSE 600 MG: 12 INJECTION, SOLUTION INTRAVENOUS at 08:40

## 2020-12-30 RX ADMIN — CLINDAMYCIN IN 5 PERCENT DEXTROSE 600 MG: 12 INJECTION, SOLUTION INTRAVENOUS at 15:50

## 2020-12-30 RX ADMIN — METOPROLOL TARTRATE 100 MG: 100 TABLET ORAL at 08:37

## 2020-12-30 RX ADMIN — APIXABAN 10 MG: 5 TABLET, FILM COATED ORAL at 08:37

## 2020-12-30 RX ADMIN — SUCRALFATE 1 G: 1 TABLET ORAL at 08:37

## 2020-12-30 RX ADMIN — CLINDAMYCIN IN 5 PERCENT DEXTROSE 600 MG: 12 INJECTION, SOLUTION INTRAVENOUS at 03:36

## 2020-12-30 RX ADMIN — HYDROCODONE BITARTRATE AND ACETAMINOPHEN 1 TABLET: 7.5; 325 TABLET ORAL at 08:40

## 2020-12-30 RX ADMIN — PANTOPRAZOLE SODIUM 40 MG: 40 TABLET, DELAYED RELEASE ORAL at 05:29

## 2020-12-30 RX ADMIN — CLONIDINE HYDROCHLORIDE 0.1 MG: 0.1 TABLET ORAL at 16:37

## 2020-12-30 RX ADMIN — ONDANSETRON HYDROCHLORIDE 4 MG: 2 SOLUTION INTRAMUSCULAR; INTRAVENOUS at 05:04

## 2020-12-30 RX ADMIN — KETOROLAC TROMETHAMINE 60 MG: 30 INJECTION, SOLUTION INTRAMUSCULAR; INTRAVENOUS at 05:02

## 2020-12-30 RX ADMIN — DILTIAZEM HYDROCHLORIDE 60 MG: 60 TABLET, FILM COATED ORAL at 14:29

## 2020-12-30 RX ADMIN — LEVOTHYROXINE SODIUM 125 MCG: 125 TABLET ORAL at 05:04

## 2020-12-30 RX ADMIN — SUCRALFATE 1 G: 1 TABLET ORAL at 11:38

## 2020-12-30 RX ADMIN — LOSARTAN POTASSIUM 100 MG: 50 TABLET, FILM COATED ORAL at 08:37

## 2020-12-30 RX ADMIN — SODIUM CHLORIDE, PRESERVATIVE FREE 10 ML: 5 INJECTION INTRAVENOUS at 08:37

## 2020-12-30 RX ADMIN — CLONIDINE HYDROCHLORIDE 0.1 MG: 0.1 TABLET ORAL at 08:37

## 2020-12-30 RX ADMIN — HYDROCODONE BITARTRATE AND ACETAMINOPHEN 1 TABLET: 7.5; 325 TABLET ORAL at 14:29

## 2020-12-31 ENCOUNTER — READMISSION MANAGEMENT (OUTPATIENT)
Dept: CALL CENTER | Facility: HOSPITAL | Age: 50
End: 2020-12-31

## 2020-12-31 NOTE — OUTREACH NOTE
COVID-19 Week 1 Survey      Responses   [facility] patient discharged from?  [place]   Does the patient have one of the following disease processes/diagnoses(primary or secondary)?  COVID-19   COVID-19 underlying condition?  None   Call Number  Call 1   Week 1 Call successful?  No   Discharge diagnosis  Arm DVT,   Covid 19          Tasha Lynn MA

## 2020-12-31 NOTE — OUTREACH NOTE
Prep Survey      Responses   [facility] facility patient discharged from?  [place]   Is LACE score < 7 ?  Yes   Emergency Room discharge w/ pulse ox?  No   Eligibility  Readm Mgmt   Discharge diagnosis  Arm DVT,   Covid 19   Does the patient have one of the following disease processes/diagnoses(primary or secondary)?  COVID-19   Does the patient have Home health ordered?  No   Is there a DME ordered?  No   Prep survey completed?  Yes          Chioma Rosas RN

## 2021-01-01 NOTE — DISCHARGE SUMMARY
"[facility]   DISCHARGE SUMMARY       Date of Admission: 12/28/2020  Date of Discharge:  12/30/2020 Primary Care Physician: Yoko Smith APRN    Presenting Problem/History of Present Illness:  Arm DVT (deep venous thromboembolism), acute, right (CMS/HCC) [I82.621]     Final Discharge Diagnoses:  Active Hospital Problems    Diagnosis   • Arm DVT (deep venous thromboembolism), acute, right (CMS/HCC)       Consults: none    Procedures Performed: doppler RUE    Pertinent Test Results: as above    Chief Complaint on Day of Discharge: im ready to go home    History of Present Illness on Day of Discharge: recent arm pain and swelling resolved     Hospital Course:  The patient is a 50 y.o. female who presented to [facility] with right arm pain and swelling following iv at recent hospitalization. She was admittted with anticoagulation and and elevation with antbx. Her arm pain and swelling resolved and she was discharged..      Condition on Discharge:  good    Physical Exam on Discharge:  /86 (BP Location: Left arm, Patient Position: Sitting)   Pulse 59   Temp 98.8 °F (37.1 °C) (Oral)   Resp 18   Ht 160 cm (63\")   Wt 92.4 kg (203 lb 12.8 oz)   SpO2 96%   Breastfeeding No   BMI 36.10 kg/m²   Physical Exam  Vitals signs and nursing note reviewed. Exam conducted with a chaperone present.   Constitutional:       Appearance: Normal appearance.   Neck:      Musculoskeletal: Normal range of motion and neck supple.   Cardiovascular:      Rate and Rhythm: Normal rate and regular rhythm.      Pulses: Normal pulses.      Heart sounds: Normal heart sounds.   Pulmonary:      Effort: Pulmonary effort is normal.      Breath sounds: Normal breath sounds.   Abdominal:      General: Abdomen is flat. Bowel sounds are normal.      Palpations: Abdomen is soft.   Neurological:      Mental Status: She is alert.         Discharge Disposition:  Home or Self Care    Discharge Medications:     Discharge " Medications      New Medications      Instructions Start Date   apixaban 5 MG tablet tablet  Commonly known as: ELIQUIS   10 mg, Oral, Every 12 Hours Scheduled      clindamycin 300 MG capsule  Commonly known as: Cleocin   300 mg, Oral, 4 Times Daily      ketorolac 10 MG tablet  Commonly known as: TORADOL   10 mg, Oral, Every 6 Hours PRN         Continue These Medications      Instructions Start Date   albuterol sulfate  (90 Base) MCG/ACT inhaler  Commonly known as: PROVENTIL HFA;VENTOLIN HFA;PROAIR HFA   2 puffs, Inhalation, Every 4 Hours PRN      amitriptyline 50 MG tablet  Commonly known as: ELAVIL   50 mg, Oral, Nightly      cloNIDine 0.1 MG tablet  Commonly known as: CATAPRES   0.1 mg, Oral, 3 Times Daily      Cozaar 100 MG tablet  Generic drug: losartan   100 mg, Oral, Daily      cyclobenzaprine 10 MG tablet  Commonly known as: FLEXERIL   10 mg, Oral, 2 Times Daily PRN      dilTIAZem 60 MG tablet  Commonly known as: CARDIZEM   60 mg, Oral, 2 Times Daily      hydroCHLOROthiazide 12.5 MG tablet  Commonly known as: HYDRODIURIL   12.5 mg, Oral, Daily      levothyroxine 125 MCG tablet  Commonly known as: SYNTHROID, LEVOTHROID   125 mcg, Oral, Daily      metoprolol tartrate 100 MG tablet  Commonly known as: LOPRESSOR   100 mg, Oral, 2 Times Daily      Nitrostat 0.4 MG SL tablet  Generic drug: nitroglycerin   0.4 mg, Sublingual, Every 5 Minutes PRN, Take no more than 3 doses in 15 minutes.      pantoprazole 40 MG EC tablet  Commonly known as: PROTONIX   40 mg, Oral, Daily      sucralfate 1 g tablet  Commonly known as: CARAFATE   1 g, Oral, 4 Times Daily         Stop These Medications    aspirin 325 MG tablet            Discharge Diet:   As tolerated  Activity at Discharge:   Keep right arm elevated  Discharge Care Plan/Instructions: see your pcp next week for recs on continuing anticoagulation and further venous doppler testing    Follow-up Appointments:   No future appointments.    Test Results Pending at  Discharge: none    Devonte Espitia MD  01/01/21  07:35 CST    Time: 7:40am

## 2021-01-02 ENCOUNTER — READMISSION MANAGEMENT (OUTPATIENT)
Dept: CALL CENTER | Facility: HOSPITAL | Age: 51
End: 2021-01-02

## 2021-01-02 NOTE — OUTREACH NOTE
COVID-19 Week 1 Survey      Responses   [facility] patient discharged from?  [place]   Does the patient have one of the following disease processes/diagnoses(primary or secondary)?  COVID-19   COVID-19 underlying condition?  None   Call Number  Call 2   Week 1 Call successful?  No   Discharge diagnosis  Arm DVT,   Covid 19          Tasha Darby RN

## 2021-01-03 ENCOUNTER — READMISSION MANAGEMENT (OUTPATIENT)
Dept: CALL CENTER | Facility: HOSPITAL | Age: 51
End: 2021-01-03

## 2021-01-03 NOTE — OUTREACH NOTE
COVID-19 Week 1 Survey      Responses   [facility] patient discharged from?  [place]   Does the patient have one of the following disease processes/diagnoses(primary or secondary)?  COVID-19   COVID-19 underlying condition?  None [DVT]   Call Number  Call 3   Week 1 Call successful?  Yes   Call start time  0924   Call end time  0927   Discharge diagnosis  Covid Arm DVT (deep venous thromboembolism), acute, right (CMS/[facility]) I82.621   Medication alerts for this patient  no medication problems   Meds reviewed with patient/caregiver?  Yes   Is the patient having any side effects they believe may be caused by any medication additions or changes?  No   Does the patient have all medications ordered at discharge?  Yes   Is the patient taking all medications as directed (includes completed medication regime)?  Yes   Comments regarding appointments  will seeing her provider on 01/07   Does the patient have a primary care provider?   Yes   Does the patient have an appointment with their PCP or specialist within 7 days of discharge?  Yes   Did the patient receive a copy of their discharge instructions?  Yes   Did the patient receive a copy of COVID-19 specific instructions?  Yes   What is the patient's perception of their health status since discharge?  Improving   Does the patient have any of the following symptoms?  None   Pulse Ox monitoring  Intermittent   Pulse Ox device source  Patient [patient had own]   O2 Sat comments  takes when she is soa   O2 Sat education comments  as needed   If the patient is a current smoker, are they able to teach back resources for cessation?  Not a smoker   Is the patient/caregiver able to teach back the hierarchy of who to call/visit for symptoms/problems? PCP, Specialist, Home health nurse, Urgent Care, ED, 911  No   COVID-19 call completed?  Yes   Wrap up additional comments  doing well          Carito Whitney RN

## 2021-01-06 ENCOUNTER — READMISSION MANAGEMENT (OUTPATIENT)
Dept: CALL CENTER | Facility: HOSPITAL | Age: 51
End: 2021-01-06

## 2021-01-06 NOTE — OUTREACH NOTE
COVID-19 Week 2 Survey      Responses   [facility] patient discharged from?  [place]   Does the patient have one of the following disease processes/diagnoses(primary or secondary)?  COVID-19   COVID-19 underlying condition?  None   Call Number  Call 1   COVID-19 Week 2: Call 1 attempt successful?  No   Discharge diagnosis  Covid Arm DVT (deep venous thromboembolism), acute, right (CMS/[facility]) I82.621          Tasha Magaña RN

## 2021-01-09 ENCOUNTER — READMISSION MANAGEMENT (OUTPATIENT)
Dept: CALL CENTER | Facility: HOSPITAL | Age: 51
End: 2021-01-09

## 2021-01-09 NOTE — OUTREACH NOTE
COVID-19 Week 2 Survey      Responses   [facility] patient discharged from?  [place]   Does the patient have one of the following disease processes/diagnoses(primary or secondary)?  COVID-19   COVID-19 underlying condition?  None   Call Number  Call 2   COVID-19 Week 2: Call 1 attempt successful?  No   Discharge diagnosis  Covid Arm DVT (deep venous thromboembolism), acute, right (CMS/[facility]) I82.621          Sahara Islas RN

## 2021-04-27 RX ORDER — SUCRALFATE 1 G/1
TABLET ORAL
Qty: 120 TABLET | Refills: 0 | Status: SHIPPED | OUTPATIENT
Start: 2021-04-27 | End: 2021-09-07

## 2021-06-25 RX ORDER — PANTOPRAZOLE SODIUM 40 MG/1
TABLET, DELAYED RELEASE ORAL
Qty: 30 TABLET | Refills: 0 | OUTPATIENT
Start: 2021-06-25

## 2021-06-25 RX ORDER — SUCRALFATE 1 G/1
TABLET ORAL
Qty: 120 TABLET | Refills: 0 | OUTPATIENT
Start: 2021-06-25

## 2021-08-04 RX ORDER — SUCRALFATE 1 G/1
TABLET ORAL
Qty: 120 TABLET | Refills: 0 | OUTPATIENT
Start: 2021-08-04

## 2021-09-07 RX ORDER — SUCRALFATE 1 G/1
TABLET ORAL
Qty: 120 TABLET | Refills: 0 | Status: SHIPPED | OUTPATIENT
Start: 2021-09-07

## 2022-04-24 ENCOUNTER — APPOINTMENT (OUTPATIENT)
Dept: GENERAL RADIOLOGY | Age: 52
DRG: 194 | End: 2022-04-24
Attending: HOSPITALIST
Payer: MEDICAID

## 2022-04-24 ENCOUNTER — HOSPITAL ENCOUNTER (INPATIENT)
Age: 52
LOS: 2 days | Discharge: HOME | DRG: 194 | End: 2022-04-26
Attending: HOSPITALIST | Admitting: INTERNAL MEDICINE
Payer: MEDICAID

## 2022-04-24 PROBLEM — J18.9 PNEUMONIA: Status: ACTIVE | Noted: 2022-04-24

## 2022-04-24 PROBLEM — R11.2 NON-INTRACTABLE VOMITING WITH NAUSEA: Status: ACTIVE | Noted: 2022-04-24

## 2022-04-24 PROBLEM — J18.9 COMMUNITY ACQUIRED PNEUMONIA OF LEFT LOWER LOBE OF LUNG: Status: ACTIVE | Noted: 2022-04-24

## 2022-04-24 PROBLEM — R05.9 COUGH: Status: ACTIVE | Noted: 2022-04-24

## 2022-04-24 LAB
ALBUMIN SERPL-MCNC: 4.7 G/DL (ref 3.5–5.2)
ALP BLD-CCNC: 78 U/L (ref 35–104)
ALT SERPL-CCNC: 17 U/L (ref 5–33)
ANION GAP SERPL CALCULATED.3IONS-SCNC: 16 MMOL/L (ref 7–19)
AST SERPL-CCNC: 20 U/L (ref 5–32)
BASOPHILS ABSOLUTE: 0 K/UL (ref 0–0.2)
BASOPHILS RELATIVE PERCENT: 0.4 % (ref 0–1)
BILIRUB SERPL-MCNC: 0.4 MG/DL (ref 0.2–1.2)
BUN BLDV-MCNC: 58 MG/DL (ref 6–20)
CALCIUM SERPL-MCNC: 9.9 MG/DL (ref 8.6–10)
CHLORIDE BLD-SCNC: 97 MMOL/L (ref 98–111)
CO2: 20 MMOL/L (ref 22–29)
CREAT SERPL-MCNC: 2 MG/DL (ref 0.5–0.9)
EOSINOPHILS ABSOLUTE: 0.1 K/UL (ref 0–0.6)
EOSINOPHILS RELATIVE PERCENT: 1.1 % (ref 0–5)
GFR AFRICAN AMERICAN: 31
GFR NON-AFRICAN AMERICAN: 26
GLUCOSE BLD-MCNC: 90 MG/DL (ref 74–109)
HCT VFR BLD CALC: 41.3 % (ref 37–47)
HEMOGLOBIN: 13.8 G/DL (ref 12–16)
IMMATURE GRANULOCYTES #: 0.2 K/UL
LYMPHOCYTES ABSOLUTE: 3.1 K/UL (ref 1.1–4.5)
LYMPHOCYTES RELATIVE PERCENT: 28.2 % (ref 20–40)
MCH RBC QN AUTO: 29.2 PG (ref 27–31)
MCHC RBC AUTO-ENTMCNC: 33.4 G/DL (ref 33–37)
MCV RBC AUTO: 87.5 FL (ref 81–99)
MONOCYTES ABSOLUTE: 0.6 K/UL (ref 0–0.9)
MONOCYTES RELATIVE PERCENT: 5.8 % (ref 0–10)
NEUTROPHILS ABSOLUTE: 6.9 K/UL (ref 1.5–7.5)
NEUTROPHILS RELATIVE PERCENT: 63 % (ref 50–65)
PDW BLD-RTO: 13.6 % (ref 11.5–14.5)
PLATELET # BLD: 232 K/UL (ref 130–400)
PMV BLD AUTO: 11.8 FL (ref 9.4–12.3)
POTASSIUM REFLEX MAGNESIUM: 4.2 MMOL/L (ref 3.5–5)
RBC # BLD: 4.72 M/UL (ref 4.2–5.4)
SODIUM BLD-SCNC: 133 MMOL/L (ref 136–145)
TOTAL PROTEIN: 7.3 G/DL (ref 6.6–8.7)
WBC # BLD: 10.9 K/UL (ref 4.8–10.8)

## 2022-04-24 PROCEDURE — 2580000003 HC RX 258: Performed by: INTERNAL MEDICINE

## 2022-04-24 PROCEDURE — 87040 BLOOD CULTURE FOR BACTERIA: CPT

## 2022-04-24 PROCEDURE — 36415 COLL VENOUS BLD VENIPUNCTURE: CPT

## 2022-04-24 PROCEDURE — 85025 COMPLETE CBC W/AUTO DIFF WBC: CPT

## 2022-04-24 PROCEDURE — 71045 X-RAY EXAM CHEST 1 VIEW: CPT

## 2022-04-24 PROCEDURE — 80053 COMPREHEN METABOLIC PANEL: CPT

## 2022-04-24 PROCEDURE — 1210000000 HC MED SURG R&B

## 2022-04-24 PROCEDURE — 6360000002 HC RX W HCPCS: Performed by: INTERNAL MEDICINE

## 2022-04-24 RX ORDER — ERGOCALCIFEROL (VITAMIN D2) 1250 MCG
50000 CAPSULE ORAL WEEKLY
COMMUNITY
Start: 2022-05-01

## 2022-04-24 RX ORDER — POLYETHYLENE GLYCOL 3350 17 G/17G
17 POWDER, FOR SOLUTION ORAL DAILY PRN
Status: DISCONTINUED | OUTPATIENT
Start: 2022-04-24 | End: 2022-04-26 | Stop reason: HOSPADM

## 2022-04-24 RX ORDER — ACETAMINOPHEN 325 MG/1
650 TABLET ORAL EVERY 6 HOURS PRN
Status: DISCONTINUED | OUTPATIENT
Start: 2022-04-24 | End: 2022-04-26 | Stop reason: HOSPADM

## 2022-04-24 RX ORDER — BUDESONIDE 0.5 MG/2ML
0.5 INHALANT ORAL 2 TIMES DAILY
Status: DISCONTINUED | OUTPATIENT
Start: 2022-04-25 | End: 2022-04-26 | Stop reason: HOSPADM

## 2022-04-24 RX ORDER — SUCRALFATE 1 G/1
1 TABLET ORAL 2 TIMES DAILY
Status: DISCONTINUED | OUTPATIENT
Start: 2022-04-24 | End: 2022-04-26 | Stop reason: HOSPADM

## 2022-04-24 RX ORDER — ENOXAPARIN SODIUM 100 MG/ML
40 INJECTION SUBCUTANEOUS DAILY
COMMUNITY

## 2022-04-24 RX ORDER — AMITRIPTYLINE HYDROCHLORIDE 75 MG/1
75 TABLET, FILM COATED ORAL NIGHTLY
COMMUNITY

## 2022-04-24 RX ORDER — SODIUM CHLORIDE 9 MG/ML
INJECTION, SOLUTION INTRAVENOUS CONTINUOUS
Status: DISCONTINUED | OUTPATIENT
Start: 2022-04-24 | End: 2022-04-26 | Stop reason: HOSPADM

## 2022-04-24 RX ORDER — FLUTICASONE PROPIONATE 110 UG/1
1 AEROSOL, METERED RESPIRATORY (INHALATION) 2 TIMES DAILY
Status: DISCONTINUED | OUTPATIENT
Start: 2022-04-24 | End: 2022-04-24 | Stop reason: CLARIF

## 2022-04-24 RX ORDER — ENOXAPARIN SODIUM 100 MG/ML
40 INJECTION SUBCUTANEOUS DAILY
Status: DISCONTINUED | OUTPATIENT
Start: 2022-04-25 | End: 2022-04-26 | Stop reason: HOSPADM

## 2022-04-24 RX ORDER — ERGOCALCIFEROL 1.25 MG/1
50000 CAPSULE ORAL WEEKLY
Status: DISCONTINUED | OUTPATIENT
Start: 2022-05-01 | End: 2022-04-26 | Stop reason: HOSPADM

## 2022-04-24 RX ORDER — ASPIRIN 81 MG/1
81 TABLET ORAL DAILY
COMMUNITY

## 2022-04-24 RX ORDER — GABAPENTIN 100 MG/1
100 CAPSULE ORAL 2 TIMES DAILY
COMMUNITY

## 2022-04-24 RX ORDER — GABAPENTIN 100 MG/1
100 CAPSULE ORAL 2 TIMES DAILY
Status: DISCONTINUED | OUTPATIENT
Start: 2022-04-24 | End: 2022-04-26 | Stop reason: HOSPADM

## 2022-04-24 RX ORDER — ONDANSETRON 4 MG/1
4 TABLET, ORALLY DISINTEGRATING ORAL EVERY 8 HOURS PRN
Status: DISCONTINUED | OUTPATIENT
Start: 2022-04-24 | End: 2022-04-26 | Stop reason: HOSPADM

## 2022-04-24 RX ORDER — CARVEDILOL 25 MG/1
25 TABLET ORAL 2 TIMES DAILY
COMMUNITY

## 2022-04-24 RX ORDER — FAMOTIDINE 20 MG/1
20 TABLET, FILM COATED ORAL 2 TIMES DAILY
Status: DISCONTINUED | OUTPATIENT
Start: 2022-04-24 | End: 2022-04-26 | Stop reason: DRUGHIGH

## 2022-04-24 RX ORDER — CEFTRIAXONE 1 G/1
1000 INJECTION, POWDER, FOR SOLUTION INTRAMUSCULAR; INTRAVENOUS EVERY 24 HOURS
Status: ON HOLD | COMMUNITY
End: 2022-04-26 | Stop reason: HOSPADM

## 2022-04-24 RX ORDER — NITROGLYCERIN 0.4 MG/1
0.4 TABLET SUBLINGUAL EVERY 5 MIN PRN
COMMUNITY

## 2022-04-24 RX ORDER — LEVOTHYROXINE SODIUM 0.1 MG/1
100 TABLET ORAL DAILY
Status: DISCONTINUED | OUTPATIENT
Start: 2022-04-25 | End: 2022-04-26 | Stop reason: HOSPADM

## 2022-04-24 RX ORDER — CYCLOBENZAPRINE HCL 10 MG
10 TABLET ORAL 2 TIMES DAILY
COMMUNITY

## 2022-04-24 RX ORDER — LEVOTHYROXINE SODIUM 0.1 MG/1
100 TABLET ORAL DAILY
COMMUNITY

## 2022-04-24 RX ORDER — NITROGLYCERIN 0.4 MG/1
0.4 TABLET SUBLINGUAL EVERY 5 MIN PRN
Status: DISCONTINUED | OUTPATIENT
Start: 2022-04-24 | End: 2022-04-26 | Stop reason: HOSPADM

## 2022-04-24 RX ORDER — SODIUM CHLORIDE 0.9 % (FLUSH) 0.9 %
5-40 SYRINGE (ML) INJECTION PRN
Status: DISCONTINUED | OUTPATIENT
Start: 2022-04-24 | End: 2022-04-26 | Stop reason: HOSPADM

## 2022-04-24 RX ORDER — CLONIDINE HYDROCHLORIDE 0.1 MG/1
0.1 TABLET ORAL DAILY
Status: DISCONTINUED | OUTPATIENT
Start: 2022-04-25 | End: 2022-04-26 | Stop reason: HOSPADM

## 2022-04-24 RX ORDER — SUCRALFATE 1 G/1
1 TABLET ORAL 2 TIMES DAILY
COMMUNITY

## 2022-04-24 RX ORDER — IPRATROPIUM BROMIDE AND ALBUTEROL SULFATE 2.5; .5 MG/3ML; MG/3ML
1 SOLUTION RESPIRATORY (INHALATION)
Status: DISCONTINUED | OUTPATIENT
Start: 2022-04-25 | End: 2022-04-26 | Stop reason: HOSPADM

## 2022-04-24 RX ORDER — ASPIRIN 81 MG/1
81 TABLET ORAL DAILY
Status: DISCONTINUED | OUTPATIENT
Start: 2022-04-25 | End: 2022-04-26 | Stop reason: HOSPADM

## 2022-04-24 RX ORDER — ACETAMINOPHEN 650 MG/1
650 SUPPOSITORY RECTAL EVERY 6 HOURS PRN
Status: DISCONTINUED | OUTPATIENT
Start: 2022-04-24 | End: 2022-04-26 | Stop reason: HOSPADM

## 2022-04-24 RX ORDER — ONDANSETRON 2 MG/ML
4 INJECTION INTRAMUSCULAR; INTRAVENOUS EVERY 6 HOURS PRN
Status: DISCONTINUED | OUTPATIENT
Start: 2022-04-24 | End: 2022-04-26 | Stop reason: HOSPADM

## 2022-04-24 RX ORDER — CARVEDILOL 25 MG/1
25 TABLET ORAL 2 TIMES DAILY
Status: DISCONTINUED | OUTPATIENT
Start: 2022-04-24 | End: 2022-04-26 | Stop reason: HOSPADM

## 2022-04-24 RX ORDER — ENOXAPARIN SODIUM 100 MG/ML
40 INJECTION SUBCUTANEOUS DAILY
Status: DISCONTINUED | OUTPATIENT
Start: 2022-04-25 | End: 2022-04-24 | Stop reason: SDUPTHER

## 2022-04-24 RX ORDER — CLONIDINE HYDROCHLORIDE 0.1 MG/1
0.1 TABLET ORAL DAILY
COMMUNITY

## 2022-04-24 RX ORDER — AZITHROMYCIN 250 MG/1
500 TABLET, FILM COATED ORAL DAILY
Status: ON HOLD | COMMUNITY
End: 2022-04-26 | Stop reason: HOSPADM

## 2022-04-24 RX ORDER — FAMOTIDINE 20 MG/1
20 TABLET, FILM COATED ORAL 2 TIMES DAILY
COMMUNITY

## 2022-04-24 RX ORDER — SODIUM CHLORIDE 0.9 % (FLUSH) 0.9 %
5-40 SYRINGE (ML) INJECTION EVERY 12 HOURS SCHEDULED
Status: DISCONTINUED | OUTPATIENT
Start: 2022-04-24 | End: 2022-04-26 | Stop reason: HOSPADM

## 2022-04-24 RX ORDER — FLUTICASONE PROPIONATE 110 UG/1
1 AEROSOL, METERED RESPIRATORY (INHALATION) 2 TIMES DAILY
COMMUNITY

## 2022-04-24 RX ORDER — CYCLOBENZAPRINE HCL 10 MG
10 TABLET ORAL 2 TIMES DAILY
Status: DISCONTINUED | OUTPATIENT
Start: 2022-04-24 | End: 2022-04-26 | Stop reason: HOSPADM

## 2022-04-24 RX ADMIN — ONDANSETRON 4 MG: 2 INJECTION INTRAMUSCULAR; INTRAVENOUS at 22:22

## 2022-04-24 RX ADMIN — SODIUM CHLORIDE: 9 INJECTION, SOLUTION INTRAVENOUS at 22:19

## 2022-04-24 RX ADMIN — WATER 1000 MG: 1 INJECTION INTRAMUSCULAR; INTRAVENOUS; SUBCUTANEOUS at 22:21

## 2022-04-24 RX ADMIN — AZITHROMYCIN MONOHYDRATE 500 MG: 500 INJECTION, POWDER, LYOPHILIZED, FOR SOLUTION INTRAVENOUS at 22:22

## 2022-04-24 RX ADMIN — SODIUM CHLORIDE, PRESERVATIVE FREE 10 ML: 5 INJECTION INTRAVENOUS at 22:28

## 2022-04-24 ASSESSMENT — PAIN SCALES - GENERAL: PAINLEVEL_OUTOF10: 0

## 2022-04-25 ENCOUNTER — APPOINTMENT (OUTPATIENT)
Dept: ULTRASOUND IMAGING | Age: 52
DRG: 194 | End: 2022-04-25
Attending: HOSPITALIST
Payer: MEDICAID

## 2022-04-25 LAB
ALBUMIN SERPL-MCNC: 4.1 G/DL (ref 3.5–5.2)
ALP BLD-CCNC: 69 U/L (ref 35–104)
ALT SERPL-CCNC: 17 U/L (ref 5–33)
ANION GAP SERPL CALCULATED.3IONS-SCNC: 16 MMOL/L (ref 7–19)
APTT: 26.2 SEC (ref 26–36.2)
AST SERPL-CCNC: 22 U/L (ref 5–32)
BACTERIA: NEGATIVE /HPF
BASOPHILS ABSOLUTE: 0 K/UL (ref 0–0.2)
BASOPHILS RELATIVE PERCENT: 0.4 % (ref 0–1)
BILIRUB SERPL-MCNC: 0.4 MG/DL (ref 0.2–1.2)
BILIRUBIN URINE: NEGATIVE
BLOOD, URINE: NEGATIVE
BUN BLDV-MCNC: 52 MG/DL (ref 6–20)
CALCIUM SERPL-MCNC: 9.5 MG/DL (ref 8.6–10)
CHLORIDE BLD-SCNC: 98 MMOL/L (ref 98–111)
CLARITY: CLEAR
CO2: 19 MMOL/L (ref 22–29)
COLOR: YELLOW
CREAT SERPL-MCNC: 1.8 MG/DL (ref 0.5–0.9)
CREATININE URINE: 48.6 MG/DL (ref 4.2–622)
CRYSTALS, UA: ABNORMAL /HPF
EOSINOPHIL,URINE: NORMAL
EOSINOPHILS ABSOLUTE: 0.2 K/UL (ref 0–0.6)
EOSINOPHILS RELATIVE PERCENT: 1.7 % (ref 0–5)
EPITHELIAL CELLS, UA: 3 /HPF (ref 0–5)
GFR AFRICAN AMERICAN: 35
GFR NON-AFRICAN AMERICAN: 29
GLUCOSE BLD-MCNC: 99 MG/DL (ref 74–109)
GLUCOSE URINE: NEGATIVE MG/DL
HCT VFR BLD CALC: 39.2 % (ref 37–47)
HEMOGLOBIN: 13.1 G/DL (ref 12–16)
HYALINE CASTS: 0 /HPF (ref 0–8)
IMMATURE GRANULOCYTES #: 0.2 K/UL
KETONES, URINE: 15 MG/DL
LACTIC ACID: 0.8 MMOL/L (ref 0.5–1.9)
LEUKOCYTE ESTERASE, URINE: ABNORMAL
LYMPHOCYTES ABSOLUTE: 3.9 K/UL (ref 1.1–4.5)
LYMPHOCYTES RELATIVE PERCENT: 34 % (ref 20–40)
MCH RBC QN AUTO: 29.3 PG (ref 27–31)
MCHC RBC AUTO-ENTMCNC: 33.4 G/DL (ref 33–37)
MCV RBC AUTO: 87.7 FL (ref 81–99)
MONOCYTES ABSOLUTE: 0.7 K/UL (ref 0–0.9)
MONOCYTES RELATIVE PERCENT: 6.3 % (ref 0–10)
NEUTROPHILS ABSOLUTE: 6.4 K/UL (ref 1.5–7.5)
NEUTROPHILS RELATIVE PERCENT: 55.8 % (ref 50–65)
NITRITE, URINE: POSITIVE
PARATHYROID HORMONE INTACT: 48.1 PG/ML (ref 15–65)
PDW BLD-RTO: 13.9 % (ref 11.5–14.5)
PH UA: 5 (ref 5–8)
PLATELET # BLD: 224 K/UL (ref 130–400)
PMV BLD AUTO: 11.6 FL (ref 9.4–12.3)
POTASSIUM REFLEX MAGNESIUM: 4 MMOL/L (ref 3.5–5)
PROCALCITONIN: 0.05 NG/ML (ref 0–0.09)
PROTEIN UA: NEGATIVE MG/DL
RBC # BLD: 4.47 M/UL (ref 4.2–5.4)
RBC UA: 1 /HPF (ref 0–4)
SODIUM BLD-SCNC: 133 MMOL/L (ref 136–145)
SODIUM URINE: 43 MMOL/L
SPECIFIC GRAVITY UA: 1.01 (ref 1–1.03)
TOTAL PROTEIN: 6.6 G/DL (ref 6.6–8.7)
UREA NITROGEN, UR: 457 MG/DL
URIC ACID, SERUM: 16.3 MG/DL (ref 2.4–5.7)
UROBILINOGEN, URINE: 0.2 E.U./DL
VITAMIN D 25-HYDROXY: 33.6 NG/ML
WBC # BLD: 11.4 K/UL (ref 4.8–10.8)
WBC UA: 18 /HPF (ref 0–5)

## 2022-04-25 PROCEDURE — 87205 SMEAR GRAM STAIN: CPT

## 2022-04-25 PROCEDURE — 6360000002 HC RX W HCPCS: Performed by: INTERNAL MEDICINE

## 2022-04-25 PROCEDURE — 87040 BLOOD CULTURE FOR BACTERIA: CPT

## 2022-04-25 PROCEDURE — 82306 VITAMIN D 25 HYDROXY: CPT

## 2022-04-25 PROCEDURE — 83605 ASSAY OF LACTIC ACID: CPT

## 2022-04-25 PROCEDURE — 85730 THROMBOPLASTIN TIME PARTIAL: CPT

## 2022-04-25 PROCEDURE — 6370000000 HC RX 637 (ALT 250 FOR IP): Performed by: INTERNAL MEDICINE

## 2022-04-25 PROCEDURE — 84300 ASSAY OF URINE SODIUM: CPT

## 2022-04-25 PROCEDURE — 94640 AIRWAY INHALATION TREATMENT: CPT

## 2022-04-25 PROCEDURE — 81001 URINALYSIS AUTO W/SCOPE: CPT

## 2022-04-25 PROCEDURE — 2580000003 HC RX 258: Performed by: INTERNAL MEDICINE

## 2022-04-25 PROCEDURE — 80053 COMPREHEN METABOLIC PANEL: CPT

## 2022-04-25 PROCEDURE — 84145 PROCALCITONIN (PCT): CPT

## 2022-04-25 PROCEDURE — 85025 COMPLETE CBC W/AUTO DIFF WBC: CPT

## 2022-04-25 PROCEDURE — 36415 COLL VENOUS BLD VENIPUNCTURE: CPT

## 2022-04-25 PROCEDURE — 87086 URINE CULTURE/COLONY COUNT: CPT

## 2022-04-25 PROCEDURE — 76770 US EXAM ABDO BACK WALL COMP: CPT

## 2022-04-25 PROCEDURE — 87186 SC STD MICRODIL/AGAR DIL: CPT

## 2022-04-25 PROCEDURE — 83970 ASSAY OF PARATHORMONE: CPT

## 2022-04-25 PROCEDURE — 84550 ASSAY OF BLOOD/URIC ACID: CPT

## 2022-04-25 PROCEDURE — 1210000000 HC MED SURG R&B

## 2022-04-25 PROCEDURE — 82570 ASSAY OF URINE CREATININE: CPT

## 2022-04-25 PROCEDURE — 84540 ASSAY OF URINE/UREA-N: CPT

## 2022-04-25 RX ORDER — SODIUM CHLORIDE 9 MG/ML
INJECTION, SOLUTION INTRAVENOUS CONTINUOUS
Status: DISCONTINUED | OUTPATIENT
Start: 2022-04-25 | End: 2022-04-26 | Stop reason: HOSPADM

## 2022-04-25 RX ORDER — ALLOPURINOL 100 MG/1
100 TABLET ORAL DAILY
Status: DISCONTINUED | OUTPATIENT
Start: 2022-04-25 | End: 2022-04-26 | Stop reason: HOSPADM

## 2022-04-25 RX ADMIN — FAMOTIDINE 20 MG: 20 TABLET ORAL at 08:44

## 2022-04-25 RX ADMIN — LEVOTHYROXINE SODIUM 100 MCG: 100 TABLET ORAL at 08:42

## 2022-04-25 RX ADMIN — SODIUM CHLORIDE: 9 INJECTION, SOLUTION INTRAVENOUS at 08:44

## 2022-04-25 RX ADMIN — CYCLOBENZAPRINE 10 MG: 10 TABLET, FILM COATED ORAL at 21:39

## 2022-04-25 RX ADMIN — GABAPENTIN 100 MG: 100 CAPSULE ORAL at 21:39

## 2022-04-25 RX ADMIN — IPRATROPIUM BROMIDE AND ALBUTEROL SULFATE 1 AMPULE: 2.5; .5 SOLUTION RESPIRATORY (INHALATION) at 06:20

## 2022-04-25 RX ADMIN — GABAPENTIN 100 MG: 100 CAPSULE ORAL at 08:43

## 2022-04-25 RX ADMIN — FAMOTIDINE 20 MG: 20 TABLET ORAL at 21:41

## 2022-04-25 RX ADMIN — AZITHROMYCIN MONOHYDRATE 500 MG: 500 INJECTION, POWDER, LYOPHILIZED, FOR SOLUTION INTRAVENOUS at 23:49

## 2022-04-25 RX ADMIN — AMITRIPTYLINE HYDROCHLORIDE 75 MG: 50 TABLET, FILM COATED ORAL at 21:39

## 2022-04-25 RX ADMIN — WATER 1000 MG: 1 INJECTION INTRAMUSCULAR; INTRAVENOUS; SUBCUTANEOUS at 23:50

## 2022-04-25 RX ADMIN — SUCRALFATE 1 G: 1 TABLET ORAL at 21:39

## 2022-04-25 RX ADMIN — SUCRALFATE 1 G: 1 TABLET ORAL at 08:42

## 2022-04-25 RX ADMIN — CYCLOBENZAPRINE 10 MG: 10 TABLET, FILM COATED ORAL at 08:43

## 2022-04-25 RX ADMIN — ONDANSETRON 4 MG: 2 INJECTION INTRAMUSCULAR; INTRAVENOUS at 03:09

## 2022-04-25 RX ADMIN — BUDESONIDE 500 MCG: 0.5 SUSPENSION RESPIRATORY (INHALATION) at 19:39

## 2022-04-25 RX ADMIN — BUDESONIDE 500 MCG: 0.5 SUSPENSION RESPIRATORY (INHALATION) at 06:20

## 2022-04-25 RX ADMIN — ALLOPURINOL 100 MG: 100 TABLET ORAL at 15:48

## 2022-04-25 RX ADMIN — ENOXAPARIN SODIUM 40 MG: 100 INJECTION SUBCUTANEOUS at 08:44

## 2022-04-25 RX ADMIN — IPRATROPIUM BROMIDE AND ALBUTEROL SULFATE 1 AMPULE: 2.5; .5 SOLUTION RESPIRATORY (INHALATION) at 19:39

## 2022-04-25 RX ADMIN — ASPIRIN 81 MG: 81 TABLET, COATED ORAL at 08:44

## 2022-04-25 RX ADMIN — IPRATROPIUM BROMIDE AND ALBUTEROL SULFATE 1 AMPULE: 2.5; .5 SOLUTION RESPIRATORY (INHALATION) at 10:12

## 2022-04-25 RX ADMIN — POLYETHYLENE GLYCOL 3350 17 G: 17 POWDER, FOR SOLUTION ORAL at 20:07

## 2022-04-25 ASSESSMENT — PAIN DESCRIPTION - LOCATION: LOCATION: ABDOMEN

## 2022-04-25 ASSESSMENT — PAIN SCALES - GENERAL: PAINLEVEL_OUTOF10: 6

## 2022-04-25 NOTE — PROGRESS NOTES
[facility] Progress Note    Patient:  Natalie Starr  YOB: 1970  Date of Service: 4/25/2022  MRN: [id number]   Acct: [de-identified]   Primary Care Physician: ANGELIQUE Jones CNP  Advance Directive: Full Code  Admit Date: 4/24/2022       Hospital Day: 1      CHIEF COMPLAINT: Nausea, vomiting and cough    4/25/2022 1:28 PM  Subjective / Interval History:   04/25/2022  No acute changes or acute overnight event reported. Nausea and vomiting improved. Cough controlled. Laying comfortably in bed no apparent acute distress. Patient denies any acute complaints or distress at this time. Review of Systems:   Review of Systems  ROS: 14 point review of systems is negative except as specifically addressed above. ADULT DIET;  Regular    Intake/Output Summary (Last 24 hours) at 4/25/2022 1328  Last data filed at 4/25/2022 0941  Gross per 24 hour   Intake 592 ml   Output 700 ml   Net -108 ml       Medications:   sodium chloride 125 mL/hr at 04/25/22 0844    sodium chloride 100 mL/hr at 04/24/22 2219     Current Facility-Administered Medications   Medication Dose Route Frequency Provider Last Rate Last Admin    0.9 % sodium chloride infusion   IntraVENous Continuous Hermelinda Paz  mL/hr at 04/25/22 0844 New Bag at 04/25/22 0844    allopurinol (ZYLOPRIM) tablet 100 mg  100 mg Oral Daily Hermelinda Paz MD        sodium chloride flush 0.9 % injection 5-40 mL  5-40 mL IntraVENous 2 times per day Patience Rajiv, DO   10 mL at 04/24/22 2228    sodium chloride flush 0.9 % injection 5-40 mL  5-40 mL IntraVENous PRN Patience Rajiv, DO        0.9 % sodium chloride infusion   IntraVENous Continuous Patience Rajiv,  mL/hr at 04/24/22 2219 New Bag at 04/24/22 2219    ondansetron (ZOFRAN-ODT) disintegrating tablet 4 mg  4 mg Oral Q8H PRN Patience Rajiv, DO        Or    ondansetron (ZOFRAN) injection 4 mg  4 mg IntraVENous Q6H PRN Patience Rajiv, DO   4 mg at 04/25/22 0309    polyethylene glycol (GLYCOLAX) packet 17 g  17 g Oral Daily PRN Patience Rajiv, DO        acetaminophen (TYLENOL) tablet 650 mg  650 mg Oral Q6H PRN Patience Rajiv, DO        Or    acetaminophen (TYLENOL) suppository 650 mg  650 mg Rectal Q6H PRN Patience Rajiv, DO        azithromycin (ZITHROMAX) 500 mg in D5W 250ml Vial Mate  500 mg IntraVENous Q24H Patience Rajiv, DO   Stopped at 04/25/22 0029    cefTRIAXone (ROCEPHIN) 1,000 mg in sterile water 10 mL IV syringe  1,000 mg IntraVENous Q24H Patience Rajiv, DO   1,000 mg at 04/24/22 2221    ipratropium-albuterol (DUONEB) nebulizer solution 1 ampule  1 ampule Inhalation Q4H WA Patience Rajiv, DO   1 ampule at 04/25/22 1012    amitriptyline (ELAVIL) tablet 75 mg  75 mg Oral Nightly Patience Rajiv, DO        aspirin EC tablet 81 mg  81 mg Oral Daily Patience Rajiv, DO   81 mg at 04/25/22 0844    carvedilol (COREG) tablet 25 mg  25 mg Oral BID Patience Rajiv, DO        cloNIDine (CATAPRES) tablet 0.1 mg  0.1 mg Oral Daily Patience Rajiv, DO        cyclobenzaprine (FLEXERIL) tablet 10 mg  10 mg Oral BID Patience Rajiv, DO   10 mg at 04/25/22 0843    enoxaparin (LOVENOX) injection 40 mg  40 mg SubCUTAneous Daily Patience Rajiv, DO   40 mg at 04/25/22 0844    [START ON 5/1/2022] vitamin D (ERGOCALCIFEROL) capsule 50,000 Units  50,000 Units Oral Weekly Patience Rajiv, DO        famotidine (PEPCID) tablet 20 mg  20 mg Oral BID Patience Rajiv, DO   20 mg at 04/25/22 0844    gabapentin (NEURONTIN) capsule 100 mg  100 mg Oral BID Patience Rajiv, DO   100 mg at 04/25/22 0843    levothyroxine (SYNTHROID) tablet 100 mcg  100 mcg Oral Daily Patience Rajiv, DO   100 mcg at 04/25/22 0842    nitroGLYCERIN (NITROSTAT) SL tablet 0.4 mg  0.4 mg SubLINGual Q5 Min PRN Patience Rajiv, DO        sucralfate (CARAFATE) tablet 1 g  1 g Oral BID Patience Rajiv, DO   1 g at 04/25/22 0842    budesonide (PULMICORT) nebulizer suspension 500 mcg  0.5 mg Nebulization BID Patience Rajiv, DO   500 mcg at 04/25/22 1227         sodium chloride 125 mL/hr at 04/25/22 0844    sodium chloride 100 mL/hr at 04/24/22 2219      allopurinol  100 mg Oral Daily    sodium chloride flush  5-40 mL IntraVENous 2 times per day    azithromycin  500 mg IntraVENous Q24H    cefTRIAXone (ROCEPHIN) IV  1,000 mg IntraVENous Q24H    ipratropium-albuterol  1 ampule Inhalation Q4H WA    amitriptyline  75 mg Oral Nightly    aspirin  81 mg Oral Daily    carvedilol  25 mg Oral BID    cloNIDine  0.1 mg Oral Daily    cyclobenzaprine  10 mg Oral BID    enoxaparin  40 mg SubCUTAneous Daily    [START ON 5/1/2022] vitamin D  50,000 Units Oral Weekly    famotidine  20 mg Oral BID    gabapentin  100 mg Oral BID    levothyroxine  100 mcg Oral Daily    sucralfate  1 g Oral BID    budesonide  0.5 mg Nebulization BID     sodium chloride flush, ondansetron **OR** ondansetron, polyethylene glycol, acetaminophen **OR** acetaminophen, nitroGLYCERIN  ADULT DIET; Regular       Labs:   CBC with DIFF:  Recent Labs     04/24/22 2217 04/25/22 0327   WBC 10.9* 11.4*   RBC 4.72 4.47   HGB 13.8 13.1   HCT 41.3 39.2   MCV 87.5 87.7   MCH 29.2 29.3   MCHC 33.4 33.4   RDW 13.6 13.9    224   MPV 11.8 11.6   NEUTOPHILPCT 63.0 55.8   LYMPHOPCT 28.2 34.0   MONOPCT 5.8 6.3   EOSRELPCT 1.1 1.7   BASOPCT 0.4 0.4   NEUTROABS 6.9 6.4   LYMPHSABS 3.1 3.9   MONOSABS 0.60 0.70   EOSABS 0.10 0.20   BASOSABS 0.00 0.00       CMP/BMP:  Recent Labs     04/24/22 2217 04/25/22  0327   * 133*   K 4.2 4.0   CL 97* 98   CO2 20* 19*   ANIONGAP 16 16   GLUCOSE 90 99   BUN 58* 52*   CREATININE 2.0* 1.8*   LABGLOM 26* 29*   CALCIUM 9.9 9.5   PROT 7.3 6.6   LABALBU 4.7 4.1   BILITOT 0.4 0.4   ALKPHOS 78 69   ALT 17 17   AST 20 22         CRP:  No results for input(s): CRP in the last 72 hours. Sed Rate:  No results for input(s): SEDRATE in the last 72 hours.       HgBA1c:  No components found for: HGBA1C  FLP:  No results found for: TRIG, HDL, LDLCALC, LDLDIRECT, LABVLDL  TSH:  No results found for: TSH  Troponin T: No results for input(s): TROPONINI in the last 72 hours. Pro-BNP: No results for input(s): BNP in the last 72 hours. INR: No results for input(s): INR in the last 72 hours. ABGs: No results found for: PHART, PO2ART, UAZ1CDQ  UA:  Recent Labs     04/25/22  0330   COLORU YELLOW   PHUR 5.0   WBCUA 18*   RBCUA 1   BACTERIA NEGATIVE*   CLARITYU Clear   SPECGRAV 1.015   LEUKOCYTESUR SMALL*   UROBILINOGEN 0.2   BILIRUBINUR Negative   BLOODU Negative   GLUCOSEU Negative         Culture Results:    No results for input(s): CXSURG in the last 720 hours. Blood Culture Recent: No results for input(s): BC in the last 720 hours. No results for input(s): BC, BLOODCULT2, ORG in the last 72 hours. Cultures:   No results for input(s): CULTURE in the last 72 hours. No results for input(s): BC, Arely Nicanor in the last 72 hours. No results for input(s): CXSURG in the last 72 hours. No results for input(s): MG, PHOS in the last 72 hours. Recent Labs     04/24/22 2217 04/25/22  0327   AST 20 22   ALT 17 17   BILITOT 0.4 0.4   ALKPHOS 78 69         RAD:   XR CHEST PORTABLE    Result Date: 4/24/2022  EXAMINATION: XR CHEST PORTABLE 4/24/2022 11:44 PM HISTORY: Pneumonia. Report: A portable upright frontal view of the chest was obtained. COMPARISON: There are no correlative imaging studies for comparison. The lungs are hypoaerated, no infiltrate is seen. Heart size is normal. No pneumothorax or effusion is identified. The bony thorax and upper abdomen are unremarkable. Impression: Shallow inspiration, no acute findings. Signed by Dr Maggi Dee.  Tucker        Objective:   Vitals:   BP 92/69   Pulse 78   Temp 97 °F (36.1 °C) (Temporal)   Resp 20   Ht 5' 3\" (1.6 m)   Wt 197 lb (89.4 kg)   SpO2 96%   BMI 34.90 kg/m²       Patient Vitals for the past 24 hrs:   BP Temp Temp src Pulse Resp SpO2 Height Weight 04/25/22 1203 92/69 97 °F (36.1 °C) Temporal 78 20 96 % -- --   04/25/22 0626 86/64 96.5 °F (35.8 °C) Temporal 80 16 95 % -- --   04/25/22 0625 -- -- -- -- 18 95 % -- --   04/24/22 2018 (!) 119/90 97.4 °F (36.3 °C) Temporal 80 18 97 % 5' 3\" (1.6 m) 197 lb (89.4 kg)       24HR INTAKE/OUTPUT:      Intake/Output Summary (Last 24 hours) at 4/25/2022 1328  Last data filed at 4/25/2022 0941  Gross per 24 hour   Intake 592 ml   Output 700 ml   Net -108 ml       Physical Exam  Vitals and nursing note reviewed. Constitutional:       General: She is not in acute distress. Appearance: Normal appearance. She is obese. She is not ill-appearing, toxic-appearing or diaphoretic. HENT:      Head: Normocephalic and atraumatic. Right Ear: External ear normal.      Left Ear: External ear normal.      Nose: Nose normal. No congestion or rhinorrhea. Mouth/Throat:      Mouth: Mucous membranes are moist.      Pharynx: Oropharynx is clear. No oropharyngeal exudate or posterior oropharyngeal erythema. Eyes:      General: No scleral icterus. Right eye: No discharge. Left eye: No discharge. Extraocular Movements: Extraocular movements intact. Conjunctiva/sclera: Conjunctivae normal.      Pupils: Pupils are equal, round, and reactive to light. Cardiovascular:      Rate and Rhythm: Normal rate and regular rhythm. Pulses: Normal pulses. Heart sounds: Normal heart sounds. No murmur heard. No friction rub. No gallop. Pulmonary:      Effort: Pulmonary effort is normal. No respiratory distress. Breath sounds: Normal breath sounds. No stridor. No wheezing, rhonchi or rales. Chest:      Chest wall: No tenderness. Abdominal:      General: Bowel sounds are normal. There is no distension. Palpations: Abdomen is soft. Tenderness: There is no abdominal tenderness. There is no guarding or rebound.    Musculoskeletal:         General: No swelling, tenderness, deformity or signs of injury. Normal range of motion. Cervical back: Normal range of motion and neck supple. No rigidity. No muscular tenderness. Right lower leg: No edema. Left lower leg: No edema. Skin:     General: Skin is warm and dry. Capillary Refill: Capillary refill takes less than 2 seconds. Coloration: Skin is not jaundiced or pale. Findings: No bruising, erythema, lesion or rash. Neurological:      General: No focal deficit present. Mental Status: She is alert and oriented to person, place, and time. Cranial Nerves: No cranial nerve deficit. Sensory: No sensory deficit. Motor: No weakness. Coordination: Coordination normal.   Psychiatric:         Mood and Affect: Mood normal.         Behavior: Behavior normal.         Thought Content: Thought content normal.         Judgment: Judgment normal.           Assessment/plan:         Hospital Problems           Last Modified POA    * (Principal) Pneumonia 4/24/2022 Yes    Community acquired pneumonia of left lower lobe of lung 4/24/2022 Yes    Cough 4/24/2022 Yes    Non-intractable vomiting with nausea 4/24/2022 Yes          Principal Problem:    Pneumonia  Active Problems:    Community acquired pneumonia of left lower lobe of lung    Cough    Non-intractable vomiting with nausea  Resolved Problems:    * No resolved hospital problems. *        Brief Summary  Ms. Alejandro Rizo, a 56-year-old female, history of HTN, transferred from [facility]), to [facility] (04/24/2022), for the management of PNA and ROBERT. Patient reportedly presented to OSH with complaints of 2-week history of nausea, vomiting as well as cough. CT abdomen pelvis, reportedly unremarkable as per documentation from OSH     CXR with significant left lower lobe PNA. Pneumonia, due to unspecified organism.    Procalcitonin level   Blood culture    Ceftriaxone and Azithromycin   Oxygen supplementation to keep SPO2 > 92%   Bronchodilators as needed   Continue to monitor    ROBERT   Unclear-no known baseline creatinine   No documented history of CKD   Creatinine level on presentation: 2.0 (04/24/2020)   IV hydration    Urine studies  o Eosinophils, Urine  o Urea nitrogen, Urine  o Creatinine, urine  o Sodium, urine     Renal Ultrasound Complete (04/25/2022)   Check serum intact PTH level    Check vitamin D 25-hydroxy   Avoid hypotension & Nephrotoxins    Consider Nephrology Consult if no improvement      Hyperuricemia  · Uric acid level of 16.3 (0/25/2022)  · Allopurinol 100 mg p.o. daily        Continue management of other chronic medical conditions - see above and orders. Advance Directive: Full Code    ADULT DIET; Regular         Consults Made:   None    DVT prophylaxis: Enoxaparin    Discharge planning: tbd    Time Spent is 35 mins in the examination, evaluation, counseling and review of medications, assessment and plan.      Electronically signed by   Darryl Sorto MD, MPH, MD,   Internal Medicine Hospitalist   4/25/2022 1:28 PM

## 2022-04-25 NOTE — PLAN OF CARE
Problem: Safety - Adult  Goal: Free from fall injury  Outcome: Progressing     Problem: ABCDS Injury Assessment  Goal: Absence of physical injury  Outcome: Progressing     Problem: Pain  Goal: Verbalizes/displays adequate comfort level or baseline comfort level  Outcome: Progressing

## 2022-04-25 NOTE — H&P
[place], [place], [address]    DEPARTMENT OF HOSPITALIST MEDICINE      HISTORY & PHYSICAL:          REASON FOR ADMISSION:  No chief complaint on file. HISTORY OF PRESENT ILLNESS:  Lokesh Elise is an 45 y.o. female with history of high blood pressure who had presented on a direct admission from outside hospital from [facility] for care delivery. Patient states that she has been coughing for over 2 weeks with associated nausea and vomiting. When patient presented to the emergency room of  the transferring hospital patient was then transferred her p.m for treatment of pneumonia and acute renal failure. CT of the abdomen was unremarkable. Chest x-ray was significant for a left lower lobe pneumonia. Patient had been received before midnight and doing okay with no much complaints. I initiated patient on azithromycin and ceftriaxone for community-acquired pneumonia. .  Supportive medication initiated with antiemetic for patient nausea and vomiting. Patient did not have any further nausea or vomiting he has since been admitted. Not have much of a cough. But does have some wheezes. .  Patient did remarkably well with steroid therapy, nebulizing treatment and doses of azithromycin and ceftriaxone. This morning upon checking on up on the patient patient relates that she did not sleep not because of anything that because she cannot and does not sleep in the hospital.  I encouraged her to ask for sleeping pill if she chooses to do that will help. PAST MEDICAL HISTORY:  Hypertension    PAST SURGICAL HISTORY:  No past surgical history on file.      SOCIAL HISTORY:  Social History     Socioeconomic History    Marital status:      Spouse name: None    Number of children: None    Years of education: None    Highest education level: None   Occupational History    None   Tobacco Use    Smoking status: Never Smoker    Smokeless tobacco: Never Used   Substance and Sexual Activity  Alcohol use: None    Drug use: None    Sexual activity: None   Other Topics Concern    None   Social History Narrative    None     Social Determinants of Health     Financial Resource Strain:     Difficulty of Paying Living Expenses: Not on file   Food Insecurity:     Worried About Running Out of Food in the Last Year: Not on file    Tracy of Food in the Last Year: Not on file   Transportation Needs:     Lack of Transportation (Medical): Not on file    Lack of Transportation (Non-Medical): Not on file   Physical Activity:     Days of Exercise per Week: Not on file    Minutes of Exercise per Session: Not on file   Stress:     Feeling of Stress : Not on file   Social Connections:     Frequency of Communication with Friends and Family: Not on file    Frequency of Social Gatherings with Friends and Family: Not on file    Attends Voodoo Services: Not on file    Active Member of [address] [place] [address] [employer] or Organizations: Not on file    Attends Club or Organization Meetings: Not on file    Marital Status: Not on file   Intimate Partner Violence:     Fear of Current or Ex-Partner: Not on file    Emotionally Abused: Not on file    Physically Abused: Not on file    Sexually Abused: Not on file   Housing Stability:     Unable to Pay for Housing in the Last Year: Not on file    Number of [place] in the Last Year: Not on file    Unstable Housing in the Last Year: Not on file        FAMILY HISTORY:  No family history on file.       ALLERGIES:  Allergies   Allergen Reactions    Prednisone Palpitations and Other (See Comments)    Adhesive Tape     Codeine     Eggs Or Egg-Derived Products Other (See Comments)     unspecified  unspecified      Hydrocodone     Lanolin     Nuts [Peanut-Containing Drug Products]     Onion Other (See Comments)     unspecified  unspecified      Tomato Other (See Comments)     unspecified  unspecified      Prednisolone Palpitations        PRIOR TO ADMISSION MEDS:  Medications Prior to Admission: Ipratropium-Albuterol (ALBUTEROL-IPRATROPIUM IN), Inhale 3 mLs into the lungs every 6 hours  amitriptyline (ELAVIL) 75 MG tablet, Take 75 mg by mouth nightly  azithromycin (ZITHROMAX) 250 MG tablet, Take 500 mg by mouth daily  carvedilol (COREG) 25 MG tablet, Take 25 mg by mouth 2 times daily  cloNIDine (CATAPRES) 0.1 MG tablet, Take 0.1 mg by mouth daily  cyclobenzaprine (FLEXERIL) 10 MG tablet, Take 10 mg by mouth in the morning and at bedtime  enoxaparin (LOVENOX) 40 MG/0.4ML, Inject 40 mg into the skin daily  [START ON 5/1/2022] ergocalciferol (ERGOCALCIFEROL) 1.25 MG (56240 UT) capsule, Take 50,000 Units by mouth once a week  famotidine (PEPCID) 20 MG tablet, Take 20 mg by mouth 2 times daily  fluticasone (FLOVENT HFA) 110 MCG/ACT inhaler, Inhale 1 puff into the lungs 2 times daily  gabapentin (NEURONTIN) 100 MG capsule, Take 100 mg by mouth in the morning and at bedtime. cefTRIAXone (ROCEPHIN) 1 g injection, Infuse 1,000 mg intravenously every 24 hours  levothyroxine (SYNTHROID) 100 MCG tablet, Take 100 mcg by mouth Daily  nitroGLYCERIN (NITROSTAT) 0.4 MG SL tablet, Place 0.4 mg under the tongue every 5 minutes as needed for Chest pain up to max of 3 total doses.  If no relief after 1 dose, call 911.  aspirin 81 MG EC tablet, Take 81 mg by mouth daily  sucralfate (CARAFATE) 1 GM tablet, Take 1 g by mouth in the morning and at bedtime     REVIEW OF SYSTEMS:  Constitutional:  No fevers, chills, nausea, vomiting, + tiredness & fatigue   Head:  No head injury, facial trauma   Eyes:  No acute visual changes, exudate, trauma   Ears:  No acute hearing loss, earaches   Nose: No nasal discharge, epistaxis   Neck: No new hoarseness, voice change, or new masses   Lungs:   No hemoptysis, pleurisy   Heart:  No chest pressure with exertion, palpitations,    Abdomen:   No new masses, no bright red blood per rectum   Extremities: No acute pain while ambulating, no new lesions   Skin: No new changes in skin color, no rashes or lesions   Neurologic: No new motor or sensory changes     14 point review of systems addressed with patient which is essentially negative except as specifically addressed above:    PHYSICAL EXAM:  BP (!) 119/90   Pulse 80   Temp 97.4 °F (36.3 °C) (Temporal)   Resp 18   Ht 5' 3\" (1.6 m)   Wt 197 lb (89.4 kg)   SpO2 97%   BMI 34.90 kg/m²   No intake/output data recorded. PHYSICAL EXAMINATION:    Vital Signs: Please see the chart   ANIA:  Awake, alert, oriented x 3, patient appears tired and fatigued   Head/Eyes:  Normocephalic, atraumatic, EOMI and PERRLA bilaterally   ENT: Moist mucous membranes, nasal passages clear   Neck: Supple, full range of motion, no carotid bruit, trachea midline   Respiratory:   Bilateral fair air entry in both lung fields, mild B/L crackles, symmetric expansion of chest   Cardiovascular:  Regular rate and rhythm, S1+S2+0, no murmurs/rubs   Urology: No bilateral CVA tenderness, no suprapubic tenderness   Abdomen:   Soft, non-tender, bowel sounds +ve, no organomegaly   Muscle/Joints: Moves all, full range of motion, no muscle spasms   Extremities: No clubbing, no cyanosis, no calf tenderness, no edema   Pulses: 2+ bilaterally, symmetrical   Skin: Warm, dry, no pallor/cyanosis/jaundice, no rashes/lesions   Neurologic: Awake, alert, oriented x 3, cranial nerves II-XII intact, no focal neurological deficits, sensory system intact   Psychiatric: Normal mood, non-suicidal         LABORATORY DATA:    CBC:No results for input(s): WBC, HGB, HCT, PLT in the last 72 hours. BMP:No results for input(s): NA, K, CL, CO2, BUN, CREATININE, CALCIUM, PHOS in the last 72 hours. Invalid input(s): [place] results for input(s): AST, ALT, BILIDIR, BILITOT, ALKPHOS in the last 72 hours. Coag Panel: No results for input(s): INR, PROTIME, APTT in the last 72 hours. Cardiac Enzymes: No results for input(s): [place] in the last 72 hours.   ABGs:No results found for: PHART, PO2ART, [id number]  Urinalysis:No results found for: NITRU, WBCUA, BACTERIA, RBCUA, BLOODU, SPECGRAV, GLUCOSEU  A1C: No results for input(s): LABA1C in the last 72 hours. ABG:No results for input(s): PHART, BGS5CMN, PO2ART, RIF3PLI, BEART, HGBAE, H1AEBQWA, CARBOXHGBART in the last 72 hours. EKG:   Please see chart      IMAGING:  Left lower lobe pneumonia        Assessment and Plan:  Principal Problem:  Left lower lobe pneumonia  -Received on an direct admission on transfer from another hospital  -Admit to general medical floor  -Panculture from blood and sputum  -X-ray ordered for interval change  -Patient  has left lower lobe pneumonia and transfer  -Initiated ceftriaxone and azithromycin empirically for community-acquired pneumonia  -Nebulizing treatment on board  Cough-continue with Antitussin          Nausea and vomiting  -Continue supportive antiemetic  -She had not received this and had not had any further nausea and vomiting. Weakness  -Patient has weakness may have supportive PT OT for care      Acute renal failure  -From prerenal azotemia secondary to intractable nausea and vomiting for 1 to 2 weeks  -Gentle  Hydration  -Continue to follow trend monitor      GI and DVT prophylaxis in place      CODE STATUS full       Disposition  -Patient will return home when medically stable in 1 to 2 days      Attestation:  Inpatient status is used for patients with an expected LOS extending past two midnights due to medical therapy and/or critical care needs  . .. all other patients are placed under OBServation status. Rashad Isaacs DO  9:56 PM 4/24/2022      DISCLAIMER: This note was created with electronic voice recognition which does have occasional errors. If you have any questions regarding the content within the note please do not hesitate to contact me. .. Thanks. Spontaneous, unlabored and symmetrical

## 2022-04-26 VITALS
BODY MASS INDEX: 34.91 KG/M2 | RESPIRATION RATE: 16 BRPM | HEART RATE: 76 BPM | SYSTOLIC BLOOD PRESSURE: 134 MMHG | DIASTOLIC BLOOD PRESSURE: 87 MMHG | OXYGEN SATURATION: 100 % | WEIGHT: 197 LBS | TEMPERATURE: 96.8 F | HEIGHT: 63 IN

## 2022-04-26 LAB
ANION GAP SERPL CALCULATED.3IONS-SCNC: 11 MMOL/L (ref 7–19)
BASOPHILS ABSOLUTE: 0 K/UL (ref 0–0.2)
BASOPHILS RELATIVE PERCENT: 0.5 % (ref 0–1)
BUN BLDV-MCNC: 23 MG/DL (ref 6–20)
CALCIUM SERPL-MCNC: 9.3 MG/DL (ref 8.6–10)
CHLORIDE BLD-SCNC: 104 MMOL/L (ref 98–111)
CO2: 22 MMOL/L (ref 22–29)
CREAT SERPL-MCNC: 1.3 MG/DL (ref 0.5–0.9)
EOSINOPHILS ABSOLUTE: 0.4 K/UL (ref 0–0.6)
EOSINOPHILS RELATIVE PERCENT: 5.7 % (ref 0–5)
GFR AFRICAN AMERICAN: 52
GFR NON-AFRICAN AMERICAN: 43
GLUCOSE BLD-MCNC: 125 MG/DL (ref 74–109)
HCT VFR BLD CALC: 37.1 % (ref 37–47)
HEMOGLOBIN: 11.9 G/DL (ref 12–16)
IMMATURE GRANULOCYTES #: 0.3 K/UL
LYMPHOCYTES ABSOLUTE: 2 K/UL (ref 1.1–4.5)
LYMPHOCYTES RELATIVE PERCENT: 29.8 % (ref 20–40)
MCH RBC QN AUTO: 28.8 PG (ref 27–31)
MCHC RBC AUTO-ENTMCNC: 32.1 G/DL (ref 33–37)
MCV RBC AUTO: 89.8 FL (ref 81–99)
MONOCYTES ABSOLUTE: 0.6 K/UL (ref 0–0.9)
MONOCYTES RELATIVE PERCENT: 8.6 % (ref 0–10)
NEUTROPHILS ABSOLUTE: 3.4 K/UL (ref 1.5–7.5)
NEUTROPHILS RELATIVE PERCENT: 51.3 % (ref 50–65)
PDW BLD-RTO: 14.2 % (ref 11.5–14.5)
PLATELET # BLD: 224 K/UL (ref 130–400)
PMV BLD AUTO: 11.5 FL (ref 9.4–12.3)
POTASSIUM REFLEX MAGNESIUM: 4.4 MMOL/L (ref 3.5–5)
RBC # BLD: 4.13 M/UL (ref 4.2–5.4)
SODIUM BLD-SCNC: 137 MMOL/L (ref 136–145)
WBC # BLD: 6.7 K/UL (ref 4.8–10.8)

## 2022-04-26 PROCEDURE — 80048 BASIC METABOLIC PNL TOTAL CA: CPT

## 2022-04-26 PROCEDURE — 6360000002 HC RX W HCPCS: Performed by: INTERNAL MEDICINE

## 2022-04-26 PROCEDURE — 94640 AIRWAY INHALATION TREATMENT: CPT

## 2022-04-26 PROCEDURE — 6370000000 HC RX 637 (ALT 250 FOR IP): Performed by: INTERNAL MEDICINE

## 2022-04-26 PROCEDURE — 36415 COLL VENOUS BLD VENIPUNCTURE: CPT

## 2022-04-26 PROCEDURE — 85025 COMPLETE CBC W/AUTO DIFF WBC: CPT

## 2022-04-26 RX ORDER — ALLOPURINOL 100 MG/1
100 TABLET ORAL DAILY
Qty: 30 TABLET | Refills: 0 | Status: SHIPPED | OUTPATIENT
Start: 2022-04-27

## 2022-04-26 RX ORDER — CEFDINIR 300 MG/1
300 CAPSULE ORAL 2 TIMES DAILY
Qty: 14 CAPSULE | Refills: 0 | Status: SHIPPED | OUTPATIENT
Start: 2022-04-26 | End: 2022-05-03

## 2022-04-26 RX ORDER — FAMOTIDINE 20 MG/1
20 TABLET, FILM COATED ORAL DAILY
Status: DISCONTINUED | OUTPATIENT
Start: 2022-04-27 | End: 2022-04-26 | Stop reason: HOSPADM

## 2022-04-26 RX ADMIN — GABAPENTIN 100 MG: 100 CAPSULE ORAL at 09:40

## 2022-04-26 RX ADMIN — ALLOPURINOL 100 MG: 100 TABLET ORAL at 09:40

## 2022-04-26 RX ADMIN — IPRATROPIUM BROMIDE AND ALBUTEROL SULFATE 1 AMPULE: 2.5; .5 SOLUTION RESPIRATORY (INHALATION) at 01:01

## 2022-04-26 RX ADMIN — IPRATROPIUM BROMIDE AND ALBUTEROL SULFATE 1 AMPULE: 2.5; .5 SOLUTION RESPIRATORY (INHALATION) at 15:39

## 2022-04-26 RX ADMIN — IPRATROPIUM BROMIDE AND ALBUTEROL SULFATE 1 AMPULE: 2.5; .5 SOLUTION RESPIRATORY (INHALATION) at 07:55

## 2022-04-26 RX ADMIN — FAMOTIDINE 20 MG: 20 TABLET ORAL at 09:39

## 2022-04-26 RX ADMIN — ASPIRIN 81 MG: 81 TABLET, COATED ORAL at 09:39

## 2022-04-26 RX ADMIN — LEVOTHYROXINE SODIUM 100 MCG: 100 TABLET ORAL at 09:45

## 2022-04-26 RX ADMIN — CYCLOBENZAPRINE 10 MG: 10 TABLET, FILM COATED ORAL at 09:39

## 2022-04-26 RX ADMIN — SUCRALFATE 1 G: 1 TABLET ORAL at 09:40

## 2022-04-26 RX ADMIN — IPRATROPIUM BROMIDE AND ALBUTEROL SULFATE 1 AMPULE: 2.5; .5 SOLUTION RESPIRATORY (INHALATION) at 12:11

## 2022-04-26 RX ADMIN — BUDESONIDE 500 MCG: 0.5 SUSPENSION RESPIRATORY (INHALATION) at 07:55

## 2022-04-26 RX ADMIN — ENOXAPARIN SODIUM 40 MG: 100 INJECTION SUBCUTANEOUS at 09:39

## 2022-04-26 ASSESSMENT — PAIN SCALES - GENERAL: PAINLEVEL_OUTOF10: 0

## 2022-04-26 NOTE — DISCHARGE SUMMARY
[facility]  DEPARTMENT OF HOSPITALIST MEDICINE    DISCHARGE SUMMARY:        Kev Kellogg  :  1970  MRN:  [id number]    Admit date:  2022  Discharge date: 2022      Admitting Physician:  No admitting provider for patient encounter. Advance Directive: Full Code    Consults Made:   None      Primary Care Physician:  Sandy Wilkerson, APRN - CNP    Discharge Diagnoses:  Principal Problem:    Pneumonia  Active Problems:    Community acquired pneumonia of left lower lobe of lung    Cough    Non-intractable vomiting with nausea  Resolved Problems:    * No resolved hospital problems. *      Pertinent Labs:  CBC with DIFF:  Recent Labs     22  1001   WBC 10.9* 11.4* 6.7   RBC 4.72 4.47 4.13*   HGB 13.8 13.1 11.9*   HCT 41.3 39.2 37.1   MCV 87.5 87.7 89.8   MCH 29.2 29.3 28.8   MCHC 33.4 33.4 32.1*   RDW 13.6 13.9 14.2    224 224   MPV 11.8 11.6 11.5   NEUTOPHILPCT 63.0 55.8 51.3   LYMPHOPCT 28.2 34.0 29.8   MONOPCT 5.8 6.3 8.6   EOSRELPCT 1.1 1.7 5.7*   BASOPCT 0.4 0.4 0.5   NEUTROABS 6.9 6.4 3.4   LYMPHSABS 3.1 3.9 2.0   MONOSABS 0.60 0.70 0.60   EOSABS 0.10 0.20 0.40   BASOSABS 0.00 0.00 0.00       CMP/BMP:  Recent Labs     227 22  1001   * 133* 137   K 4.2 4.0 4.4   CL 97* 98 104   CO2 20* 19* 22   ANIONGAP 16 16 11   GLUCOSE 90 99 125*   BUN 58* 52* 23*   CREATININE 2.0* 1.8* 1.3*   LABGLOM 26* 29* 43*   CALCIUM 9.9 9.5 9.3   PROT 7.3 6.6  --    LABALBU 4.7 4.1  --    BILITOT 0.4 0.4  --    ALKPHOS 78 69  --    ALT 17 17  --    AST 20 22  --          CRP:  No results for input(s): CRP in the last 72 hours. Sed Rate:  No results for input(s): SEDRATE in the last 72 hours.       HgBA1c:  No components found for: HGBA1C  FLP:  No results found for: TRIG, HDL, LDLCALC, LDLDIRECT, LABVLDL  TSH:  No results found for: TSH  Troponin T: No results for input(s): TROPONINI in the last 72 hours.  Pro-BNP: No results for input(s): BNP in the last 72 hours. INR: No results for input(s): INR in the last 72 hours. ABGs: No results found for: PHART, PO2ART, LPZ8ITX  UA:  Recent Labs     04/25/22  0330   COLORU YELLOW   PHUR 5.0   WBCUA 18*   RBCUA 1   BACTERIA NEGATIVE*   CLARITYU Clear   SPECGRAV 1.015   LEUKOCYTESUR SMALL*   UROBILINOGEN 0.2   BILIRUBINUR Negative   BLOODU Negative   GLUCOSEU Negative         Culture Results:    No results for input(s): CXSURG in the last 720 hours. Blood Culture Recent:   Recent Labs     04/24/22 2217   BC No Growth to date. Any change in status will be called. Cultures:   No results for input(s): CULTURE in the last 72 hours. Recent Labs     04/24/22 2217 04/25/22  0327   BC No Growth to date. Any change in status will be called. --    BLOODCULT2  --  No Growth to date. Any change in status will be called. No results for input(s): CXSURG in the last 72 hours. No results for input(s): MG, PHOS in the last 72 hours. Recent Labs     04/24/22 2217 04/25/22  0327   AST 20 22   ALT 17 17   BILITOT 0.4 0.4   ALKPHOS 78 69           Significant Diagnostic Studies:   US RENAL COMPLETE    Result Date: 4/25/2022  US RENAL COMPLETE 4/25/2022 3:22 PM History: Acute renal insufficiency. Grayscale and color-flow renal ultrasound. Normal size and position of both kidneys. Normal cortical thickness and normal cortical echogenicity. No hydronephrosis or shadowing stone. The right kidney measures 100 x 40 x 48 mm. Cortical thickness = 11-12 mm. The left kidney measures 112 x 45 x 52 mm. Cortical thickness = 14-16 mm.    1. No abnormality is seen. Signed by Dr Alexx Mills    XR CHEST PORTABLE    Result Date: 4/24/2022  EXAMINATION: XR CHEST PORTABLE 4/24/2022 11:44 PM HISTORY: Pneumonia. Report: A portable upright frontal view of the chest was obtained. COMPARISON: There are no correlative imaging studies for comparison.  The lungs are hypoaerated, no infiltrate is seen. Heart size is normal. No pneumothorax or effusion is identified. The bony thorax and upper abdomen are unremarkable. Impression: Shallow inspiration, no acute findings. Signed by Dr Lesley Galloway. Tucker      Most recent 2D Echo (03/11/2022) - from OSH  Conclusions / Summary:   Left ventricle: The cavity size is normal. Wall thickness is   normal. Systolic function is normal. The estimated ejection fraction is   60-65%. Wall motion is normal; there are no regional wall motion   abnormalities. Doppler parameters are consistent with abnormal left   ventricular relaxation (grade 1 diastolic dysfunction). Hospital Course:   Ms. Camelia Harada, a 69-year-old female, history of HTN, transferred from OSH [facility]), to [facility] (04/24/2022), for the management of PNA and ROBERT.     Patient reportedly presented to OSH with complaints of 2-week history of nausea, vomiting as well as cough.     CT abdomen pelvis, reportedly unremarkable as per documentation from OSH      CXR reportedly with a ;eft lower lobe PNA, as per OSH. Repeat CXR (04/24/2022) upon arrival to [facility]: Impression: Shallow inspiration, no acute findings     Pneumonia, due to unspecified organism. · Procalcitonin level within lab reference range  · Blood culture no growth to date  · Ceftriaxone and Azithromycin initiated on admission  · Cefdinir p.o. to complete antibiotic course  · Oxygen supplementation to keep SPO2 > 92%, however patient currently on room air with no oxygen requirement. · Bronchodilators as needed  · Continued to monitor     ROBERT  · [place] known baseline creatinine  · No documented history of CKD  · Creatinine level on presentation: 2.0 --> 1.8 --> 1.3 (04/26/2020)  · IV hydration   · Renal Ultrasound Complete (04/25/2022):  No abnormality is seen  · Avoid hypotension & Nephrotoxins   · Follow-up with PCP outpatient for monitoring of renal function.        Hyperuricemia  · Uric acid level of 16.3 (0/25/2022)  · Allopurinol 100 mg p.o. daily           Continued management of other chronic medical conditions         Physical Exam:  Vital Signs: /87   Pulse 76   Temp 96.8 °F (36 °C) (Temporal)   Resp 16   Ht 5' 3\" (1.6 m)   Wt 197 lb (89.4 kg)   SpO2 100%   BMI 34.90 kg/m²   Physical Exam  Vitals and nursing note reviewed. Constitutional:       General: She is not in acute distress. Appearance: Normal appearance. She is not ill-appearing, toxic-appearing or diaphoretic. HENT:      Head: Normocephalic and atraumatic. Right Ear: External ear normal.      Left Ear: External ear normal.      Nose: Nose normal. No congestion or rhinorrhea. Mouth/Throat:      Mouth: Mucous membranes are moist.      Pharynx: Oropharynx is clear. No oropharyngeal exudate or posterior oropharyngeal erythema. Eyes:      General: No scleral icterus. Right eye: No discharge. Left eye: No discharge. Extraocular Movements: Extraocular movements intact. Conjunctiva/sclera: Conjunctivae normal.      Pupils: Pupils are equal, round, and reactive to light. Cardiovascular:      Rate and Rhythm: Normal rate and regular rhythm. Pulses: Normal pulses. Heart sounds: Normal heart sounds. No murmur heard. No friction rub. No gallop. Pulmonary:      Effort: Pulmonary effort is normal. No respiratory distress. Breath sounds: Normal breath sounds. No stridor. No wheezing, rhonchi or rales. Chest:      Chest wall: No tenderness. Abdominal:      General: Bowel sounds are normal. There is no distension. Palpations: Abdomen is soft. Tenderness: There is no abdominal tenderness. There is no guarding or rebound. Musculoskeletal:         General: No swelling, tenderness, deformity or signs of injury. Normal range of motion. Cervical back: Normal range of motion and neck supple. No rigidity. No muscular tenderness. Right lower leg: No edema.       Left lower leg: No edema. Skin:     General: Skin is warm and dry. Capillary Refill: Capillary refill takes less than 2 seconds. Coloration: Skin is not jaundiced or pale. Findings: No bruising, erythema, lesion or rash. Neurological:      General: No focal deficit present. Mental Status: She is alert and oriented to person, place, and time. Cranial Nerves: No cranial nerve deficit. Sensory: No sensory deficit. Motor: No weakness. Coordination: Coordination normal.   Psychiatric:         Mood and Affect: Mood normal.         Behavior: Behavior normal.         Thought Content: Thought content normal.         Judgment: Judgment normal.         Discharge Medications:        Medication List      START taking these medications    allopurinol 100 MG tablet  Commonly known as: ZYLOPRIM  Take 1 tablet by mouth daily  Start taking on: April 27, 2022     cefdinir 300 MG capsule  Commonly known as: OMNICEF  Take 1 capsule by mouth 2 times daily for 7 days        CONTINUE taking these medications    ALBUTEROL-IPRATROPIUM IN     amitriptyline 75 MG tablet  Commonly known as: ELAVIL     aspirin 81 MG EC tablet     carvedilol 25 MG tablet  Commonly known as: COREG     cloNIDine 0.1 MG tablet  Commonly known as: CATAPRES     cyclobenzaprine 10 MG tablet  Commonly known as: FLEXERIL     ergocalciferol 1.25 MG (93431 UT) capsule  Commonly known as: ERGOCALCIFEROL  Start taking on:  May 1, 2022     famotidine 20 MG tablet  Commonly known as: PEPCID     fluticasone 110 MCG/ACT inhaler  Commonly known as: FLOVENT HFA     gabapentin 100 MG capsule  Commonly known as: NEURONTIN     levothyroxine 100 MCG tablet  Commonly known as: SYNTHROID     Lovenox 40 MG/0.4ML  Generic drug: enoxaparin     nitroGLYCERIN 0.4 MG SL tablet  Commonly known as: NITROSTAT     sucralfate 1 GM tablet  Commonly known as: CARAFATE        STOP taking these medications    azithromycin 250 MG tablet  Commonly known as: Maurizio Avendaño

## 2022-04-26 NOTE — PROGRESS NOTES
Pharmacy Renal Adjustment    Daniel Saels is a 45 y.o. female. Pharmacy has renally adjusted medications per protocol. Recent Labs     04/25/22  0327 04/26/22  1001   BUN 52* 23*       Recent Labs     04/25/22  0327 04/26/22  1001   CREATININE 1.8* 1.3*       Estimated Creatinine Clearance: 52 mL/min (A) (based on SCr of 1.3 mg/dL (H)). Height:   Ht Readings from Last 1 Encounters:   04/24/22 5' 3\" (1.6 m)     Weight:  Wt Readings from Last 1 Encounters:   04/24/22 197 lb (89.4 kg)       CKD stage: ROBERT         Baseline SCr: Unspecified    Plan: Adjust the following medications based on renal function:           Famotidine 20 mg orally twice daily adjusted to Famotidine 20 mg orally daily.     Electronically signed by Champ Escobedo [facility] on 4/26/2022 at 12:36 PM

## 2022-04-26 NOTE — CARE COORDINATION
Initial CM Assessment    Initial Assessment Completed at bedside with:    [x]   Patient  []   Family/Caregiver/Guardian   []   Other:      Patient Contact Information:  [address] [phone] (home)   Above information verified? [x]   Yes  []   No    ADLS:    Uses a cane PRN   Support System:    family   Plan to return to current housing:   [x]   Yes  []   No    Transportation plan for Discharge:  Family     Do you have any unmet social needs that would keep you from returning home safely:  []   Yes  [x]   No              Unmet Social Needs Notes:       Had HOME OXYGEN prior to admission:    []   Yes  [x]   No    Currently ACTIVE with Home Health CARE:    []   Yes  [x]   No  []   Interested at discharge  [facility]:      Current PCP:  ANGELIQUE Jenkins CNP  PCP verified? [x]   Yes  []   No    Pharmacy:    [facility] #[id number] - [facility], [address] [place] [address]  [address] [place] [address] [phone]  [address] [place] [address]  Phone: [phone] Fax: [phone]    Prefer to use Meds to Bed? []   Yes  [x]   No  Potential assistance purchasing medications? []   Yes  [x]   No    Active with HD/PD prior to admission:           []   Yes  [x]   No  HD Center:       Financial:  Payor: Andreia Herring / Plan: Chad Quach / Product Type: *No Product type* /     Pre-Cert required for SNF:   [x]   Yes  []   No    Patient Deficits:  []   Yes   [x]   No    If yes:  []   Confusion/Memory  []   Visual  []   Motor/Sensory         []   Right arm         []   Right leg         []   Left arm         []   Left leg  []   Language/Speech         []   Aphasia         []   Dysarthria         []   Swallow         Criss Coma Scale  Eye Opening: Spontaneous  Best Verbal Response: Oriented  Best Motor Response: Obeys commands  Criss Coma Scale Score: 15    Patient Deficit Notes:        Additional CM/SW Notes:   Pt's spouse is hospitalized at [facility] for same diagnosis. Pt not currently requiring oxygen. She plans to return back home with a family member to transport. She denies any further needs at this time.      Sandra Forrest and/or her family were provided with choice of provider:  [x]   Yes   []   No      [address]

## 2022-04-27 LAB
ORGANISM: ABNORMAL
ORGANISM: ABNORMAL
URINE CULTURE, ROUTINE: ABNORMAL

## 2022-04-30 LAB
BLOOD CULTURE, ROUTINE: NORMAL
CULTURE, BLOOD 2: NORMAL

## 2022-05-24 PROBLEM — R05.9 COUGH: Status: RESOLVED | Noted: 2022-04-24 | Resolved: 2022-05-24

## 2022-09-22 ENCOUNTER — APPOINTMENT (OUTPATIENT)
Dept: GENERAL RADIOLOGY | Facility: HOSPITAL | Age: 52
End: 2022-09-22

## 2022-09-22 PROCEDURE — 73562 X-RAY EXAM OF KNEE 3: CPT

## 2022-09-22 PROCEDURE — 99283 EMERGENCY DEPT VISIT LOW MDM: CPT

## 2022-09-23 ENCOUNTER — HOSPITAL ENCOUNTER (EMERGENCY)
Facility: HOSPITAL | Age: 52
Discharge: HOME | End: 2022-09-23
Attending: EMERGENCY MEDICINE | Admitting: EMERGENCY MEDICINE

## 2022-09-23 ENCOUNTER — APPOINTMENT (OUTPATIENT)
Dept: CT IMAGING | Facility: HOSPITAL | Age: 52
End: 2022-09-23

## 2022-09-23 VITALS
HEART RATE: 80 BPM | OXYGEN SATURATION: 100 % | DIASTOLIC BLOOD PRESSURE: 67 MMHG | SYSTOLIC BLOOD PRESSURE: 128 MMHG | BODY MASS INDEX: 35.44 KG/M2 | TEMPERATURE: 97.8 F | RESPIRATION RATE: 18 BRPM | HEIGHT: 63 IN | WEIGHT: 200 LBS

## 2022-09-23 DIAGNOSIS — S83.91XA SPRAIN OF RIGHT KNEE, UNSPECIFIED LIGAMENT, INITIAL ENCOUNTER: Primary | ICD-10-CM

## 2022-09-23 DIAGNOSIS — M25.469 KNEE SWELLING: ICD-10-CM

## 2022-09-23 PROCEDURE — 73700 CT LOWER EXTREMITY W/O DYE: CPT

## 2022-09-23 PROCEDURE — 25010000002 ONDANSETRON PER 1 MG: Performed by: EMERGENCY MEDICINE

## 2022-09-23 PROCEDURE — 96374 THER/PROPH/DIAG INJ IV PUSH: CPT

## 2022-09-23 PROCEDURE — 0 HYDROMORPHONE 1 MG/ML SOLUTION: Performed by: EMERGENCY MEDICINE

## 2022-09-23 PROCEDURE — 96375 TX/PRO/DX INJ NEW DRUG ADDON: CPT

## 2022-09-23 RX ORDER — ONDANSETRON 2 MG/ML
4 INJECTION INTRAMUSCULAR; INTRAVENOUS ONCE
Status: DISCONTINUED | OUTPATIENT
Start: 2022-09-23 | End: 2022-09-23

## 2022-09-23 RX ORDER — OXYCODONE HYDROCHLORIDE AND ACETAMINOPHEN 5; 325 MG/1; MG/1
1 TABLET ORAL EVERY 6 HOURS PRN
Qty: 6 TABLET | Refills: 0 | Status: SHIPPED | OUTPATIENT
Start: 2022-09-23

## 2022-09-23 RX ORDER — ONDANSETRON 2 MG/ML
4 INJECTION INTRAMUSCULAR; INTRAVENOUS ONCE
Status: COMPLETED | OUTPATIENT
Start: 2022-09-23 | End: 2022-09-23

## 2022-09-23 RX ADMIN — HYDROMORPHONE HYDROCHLORIDE 1 MG: 1 INJECTION, SOLUTION INTRAMUSCULAR; INTRAVENOUS; SUBCUTANEOUS at 01:50

## 2022-09-23 RX ADMIN — ONDANSETRON 4 MG: 2 INJECTION INTRAMUSCULAR; INTRAVENOUS at 01:50

## 2022-09-23 NOTE — ED PROVIDER NOTES
Subjective   History of Present Illness  Patient came to the ED with right knee pain.  She twisted her knee yesterday after a fall came to the ER for evaluation the knee is at a flexed position we will give her some pain medication and try to extend the knee but she would not let us.  There is no obvious fracture on x-ray no dislocation on x-ray we will get a CT scan.    Knee Pain  Location:  Knee  Injury: yes    Mechanism of injury: fall    Fall:     Fall occurred:  Standing and walking    Impact surface:  Hard floor    Point of impact:  Knees  Knee location:  R knee  Pain details:     Quality:  Aching    Radiates to:  Does not radiate    Severity:  Moderate    Onset quality:  Sudden    Timing:  Constant  Chronicity:  Recurrent  Dislocation: no    Relieved by:  Nothing  Worsened by:  Bearing weight and extension  Ineffective treatments:  None tried  Associated symptoms: no back pain, no decreased ROM, no fatigue, no fever, no itching, no muscle weakness, no neck pain, no numbness, no swelling and no tingling    Risk factors: no concern for non-accidental trauma, no frequent fractures and no recent illness        Review of Systems   Constitutional: Negative.  Negative for fatigue and fever.   HENT: Negative.    Eyes: Negative.    Respiratory: Negative.    Cardiovascular: Negative.    Gastrointestinal: Negative.    Musculoskeletal: Negative.  Negative for back pain and neck pain.   Skin: Negative.  Negative for itching.   Neurological: Negative.    All other systems reviewed and are negative.      Past Medical History:   Diagnosis Date   • Disease of thyroid gland    • Hypertension        Allergies   Allergen Reactions   • Eggs Or Egg-Derived Products Other (See Comments)     unspecified   • Onion Other (See Comments)     unspecified   • Tomato Other (See Comments)     unspecified   • Other Palpitations     Patient reports allergy to IV steriods   • Prednisolone Palpitations       Past Surgical History:   Procedure  Laterality Date   • ENDOSCOPY N/A 12/22/2020    Procedure: ESOPHAGOGASTRODUODENOSCOPY WITH ANESTHESIA;  Surgeon: Mariela Morales MD;  Location: [facility] ENDOSCOPY;  Service: Gastroenterology;  Laterality: N/A;  pre: pain of upper abd  post: esophagitis, gastritis, duodenitis  Kim Jefferson MD   • TUBAL ABDOMINAL LIGATION         No family history on file.    Social History     Socioeconomic History   • Marital status:    Tobacco Use   • Smoking status: Never Smoker   Substance and Sexual Activity   • Alcohol use: No   • Drug use: No   • Sexual activity: Defer           Objective   Physical Exam  Vitals and nursing note reviewed. Exam conducted with a chaperone present.   Constitutional:       General: She is awake.      Appearance: Normal appearance. She is well-developed. She is not ill-appearing.   HENT:      Head: Normocephalic and atraumatic.   Eyes:      General: Lids are normal.      Conjunctiva/sclera: Conjunctivae normal.      Pupils: Pupils are equal, round, and reactive to light.   Cardiovascular:      Rate and Rhythm: Normal rate and regular rhythm.      Chest Wall: PMI is not displaced.      Pulses: Normal pulses.      Heart sounds: Normal heart sounds.   Pulmonary:      Effort: Pulmonary effort is normal.      Breath sounds: Normal breath sounds. No decreased breath sounds.   Abdominal:      General: Abdomen is flat. Bowel sounds are normal.      Palpations: Abdomen is soft.      Tenderness: There is no abdominal tenderness.   Musculoskeletal:         General: Normal range of motion.      Cervical back: Full passive range of motion without pain, normal range of motion and neck supple.      Comments: Axial skeletal examination unremarkable long bones are intact no deformities upper or lower extremities the right knee is In a flexed position the patella is intact.  Movement of the knee reproduces pain dorsal pedis and posterior pulse are palpable.  There is no increased laxity of the knee joint  itself at this time.  Does not appear to be clinically dislocated   Skin:     General: Skin is warm and dry.      Capillary Refill: Capillary refill takes less than 2 seconds.   Neurological:      General: No focal deficit present.      Mental Status: She is alert and oriented to person, place, and time.      Cranial Nerves: Cranial nerves are intact.      Motor: Motor function is intact.      Deep Tendon Reflexes: Reflexes are normal and symmetric.   Psychiatric:         Behavior: Behavior is cooperative.         Procedures           ED Course  ED Course as of 09/23/22 0412   Fri Sep 23, 2022   0359 Stat read CT of the right knee large joint effusion without displaced fractures [TS]   0408 Patient currently sitting putting weight on the right leg.  I have discussed this with the patient she may require MRI to later date we will place an immobilizer and discharged home. [TS]   0408 Patient arrived to the ED with knee pain/discomfort.  Patient is hemodynamically stable.  There is low clinical suspicion of DVT with a negative Wells score and no history of DVT or risk factors for DVT in the past along with a low preclinical suspicion.  There is low suspicion for acute bacterial skin and skin structure infection or acute osteomyelitis with the patient being afebrile no evidence of  erythema or warmth no history of trauma insect bite or injury to the involved extremity with no radiological evidence of osteo .  There is low clinical suspicion of compartment syndrome with no clinical history of trauma crush injury fractures or bleeding and/or any history of extravasation injury and no physical finding of significant pain paresthesias numbness or vascular compromise.   There is low clinical evidence of fasciitis without any history of intravenous drug use and or trauma or animal or insect bites or injuries, patient with no clinical evidence of infectious process fever or immunosuppression along with no clinical findings of  fasciitis i.e. excessive pain cellulitis erythema swelling or crepitus in the involved extremity.  There is low suspicion for vascular compromise with intact distal and proximal pulses good cap refill no sensory deficits.   Patient has got some swelling in the knee probably secondary to knee sprain no obvious fractures [TS]      ED Course User Index  [TS] Luisito Moulton MD                                   [facility] reviewed by Luisito Moulton MD       [facility]    Final diagnoses:   Sprain of right knee, unspecified ligament, initial encounter   Knee swelling       ED Disposition  ED Disposition     ED Disposition   Discharge    Condition   Stable    Comment   --             Yoko Smith, APRN  [address] [place] [address]  [phone]          Riley Maher MD  [address] [place], [place] [id number] [phone]    Schedule an appointment as soon as possible for a visit            Medication List      New Prescriptions    oxyCODONE-acetaminophen 5-325 MG per tablet  Commonly known as: PERCOCET  Take 1 tablet by mouth Every 6 (Six) Hours As Needed for Moderate Pain for up to 6 doses.           Where to Get Your Medications      These medications were sent to [facility] [address] [place] [address] [facility] - [phone]  - [phone] [address] [address]    Phone: [phone]   · oxyCODONE-acetaminophen 5-325 MG per tablet          Luisito Moulton MD  09/23/22 0218       Luisito Moulton MD  09/23/22 6015

## (undated) DEVICE — SENSR O2 OXIMAX FNGR A/ 18IN NONSTR

## (undated) DEVICE — CONMED SCOPE SAVER BITE BLOCK, 20X27 MM: Brand: SCOPE SAVER

## (undated) DEVICE — YANKAUER,BULB TIP WITH VENT: Brand: ARGYLE

## (undated) DEVICE — THE CHANNEL CLEANING BRUSH IS A NYLON FLEXI BRUSH ATTACHED TO A FLEXIBLE PLASTIC SHEATH DESIGNED TO SAFELY REMOVE DEBRIS FROM FLEXIBLE ENDOSCOPES.

## (undated) DEVICE — FRCP BX RADJAW4 NDL 2.8 240 STD OG

## (undated) DEVICE — TBG SMPL FLTR LINE NASL 02/C02 A/ BX/100

## (undated) DEVICE — Device: Brand: DEFENDO AIR/WATER/SUCTION AND BIOPSY VALVE

## (undated) DEVICE — CUFF,BP,DISP,1 TUBE,ADULT,HP: Brand: MEDLINE